# Patient Record
Sex: FEMALE | Race: WHITE | NOT HISPANIC OR LATINO | Employment: FULL TIME | URBAN - METROPOLITAN AREA
[De-identification: names, ages, dates, MRNs, and addresses within clinical notes are randomized per-mention and may not be internally consistent; named-entity substitution may affect disease eponyms.]

---

## 2020-07-19 ENCOUNTER — VIRTUAL VISIT (OUTPATIENT)
Dept: FAMILY MEDICINE | Facility: OTHER | Age: 30
End: 2020-07-19

## 2020-07-21 DIAGNOSIS — R05.9 COUGH: Primary | ICD-10-CM

## 2020-07-21 PROCEDURE — U0003 INFECTIOUS AGENT DETECTION BY NUCLEIC ACID (DNA OR RNA); SEVERE ACUTE RESPIRATORY SYNDROME CORONAVIRUS 2 (SARS-COV-2) (CORONAVIRUS DISEASE [COVID-19]), AMPLIFIED PROBE TECHNIQUE, MAKING USE OF HIGH THROUGHPUT TECHNOLOGIES AS DESCRIBED BY CMS-2020-01-R: HCPCS | Performed by: FAMILY MEDICINE

## 2020-07-21 NOTE — PROGRESS NOTES
"Date: 2020 08:13:11  Clinician: Lea Linn  Clinician NPI: [id number]  Patient: Catalina Foster  Patient : 1990  Patient Address: [address] [place], [place] [address]  Patient Phone: [phone]  Visit Protocol: URI  Patient Summary:  Catalina is a 29 year old ( : 1990 ) female who initiated a Visit for COVID-19 (Coronavirus) evaluation and screening. When asked the question \"Please sign me up to receive news, health information and promotions. \", Catalina responded \"No\".    Catalina states her symptoms started 1-2 days ago.   Her symptoms consist of a sore throat and a cough.   Symptom details     Cough: Catalina coughs a few times an hour and her cough is not more bothersome at night. Phlegm does not come into her throat when she coughs. She does not believe her cough is caused by post-nasal drip.     Sore throat: Catalina reports having mild throat pain (1-3 on a 10 point pain scale), does not have exudate on her tonsils, and can swallow liquids. The lymph nodes in her neck are not enlarged. A rash has not appeared on the skin since the sore throat started.      Catalina denies having wheezing, nausea, teeth pain, ageusia, diarrhea, vomiting, rhinitis, malaise, ear pain, headache, chills, enlarged lymph nodes, myalgias, anosmia, facial pain or pressure, fever, and nasal congestion. She also denies having recent facial or sinus surgery in the past 60 days and taking antibiotic medication in the past month. She is not experiencing dyspnea.   Precipitating events  Within the past week, Catalina has not been exposed to someone with strep throat. She has not recently been exposed to someone with influenza. Catalina has been in close contact with the following high risk individuals: people with asthma, heart disease or diabetes, immunocompromised people, and adults 65 or older.   Pertinent COVID-19 (Coronavirus) information  In the past 14 days, Catalina has not worked in a congregate living setting.   She does not work " or volunteer as healthcare worker or a  and does not work or volunteer in a healthcare facility.   Catalina also has not lived in a congregate living setting in the past 14 days. She does not live with a healthcare worker.   Catalina has not had a close contact with a laboratory-confirmed COVID-19 patient within 14 days of symptom onset.   Pertinent medical history  Catalina does not get yeast infections when she takes antibiotics.   Catalina does not need a return to work/school note.   Weight: 190 lbs   Catalina does not smoke or use smokeless tobacco.   She denies pregnancy and denies breastfeeding. She does not menstruate.   Weight: 190 lbs    MEDICATIONS: spironolactone oral, ALLERGIES: clindamycin  Clinician Response:  Dear Catalina,   Your symptoms show that you may have coronavirus (COVID-19). This illness can cause fever, cough and trouble breathing. Many people get a mild case and get better on their own. Some people can get very sick.  What should I do?  We would like to test you for this virus.   1. Please call [phone] to schedule your visit. Explain that you were referred by [facility] to have a COVID-19 test. Be ready to [facility].  The following will serve as your written order for this COVID Test, ordered by me, for the indication of suspected COVID [Z20.828]: The test will be ordered in [employer], our electronic health record, after you are scheduled. It will show as ordered and authorized by Joseph Regalado MD.  Order: COVID-19 (Coronavirus) PCR for SYMPTOMATIC testing from [facility].      2. When it's time for your COVID test:  Stay at least 6 feet away from others. (If someone will drive you to your test, stay in the backseat, as far away from the  as you can.)   Cover your mouth and nose with a mask, tissue or washcloth.  Go straight to the testing site. Don't make any stops on the way there or back.      3.Starting now: Stay home and away from others (self-isolate) until:   You've had  "no fever---and no medicine that reduces fever---for 3 full days (72 hours). And...   Your other symptoms have gotten better. For example, your cough or breathing has improved. And...   At least 10 days have passed since your symptoms started.       During this time, don't leave the house except for testing or medical care.   Stay in your own room, even for meals. Use your own bathroom if you can.   Stay away from others in your home. No hugging, kissing or shaking hands. No visitors.  Don't go to work, school or anywhere else.    Clean \"high touch\" surfaces often (doorknobs, counters, handles, etc.). Use a household cleaning spray or wipes. You'll find a full list of  on the EPA website: www.epa.gov/pesticide-registration/list-n-disinfectants-use-against-sars-cov-2.   Cover your mouth and nose with a mask, tissue or washcloth to avoid spreading germs.  Wash your hands and face often. Use soap and water.  Caregivers in these groups are at risk for severe illness due to COVID-19:  o People 65 years and older  o People who live in a nursing home or long-term care facility  o People with chronic disease (lung, heart, cancer, diabetes, kidney, liver, immunologic)  o People who have a weakened immune system, including those who:   Are in cancer treatment  Take medicine that weakens the immune system, such as corticosteroids  Had a bone marrow or organ transplant  Have an immune deficiency  Have poorly controlled HIV or AIDS  Are obese (body mass index of 40 or higher)  Smoke regularly   o Caregivers should wear gloves while washing dishes, handling laundry and cleaning bedrooms and bathrooms.  o Use caution when washing and drying laundry: Don't shake dirty laundry, and use the warmest water setting that you can.  o For more tips, go to www.cdc.gov/coronavirus/2019-ncov/downloads/10Things.pdf.    4.Sign up for [employer]. We know it's scary to hear that you might have COVID-19. We want to track your symptoms to " make sure you're okay over the next 2 weeks. Please look for an email from [employer]---this is a free, online program that we'll use to keep in touch. To sign up, follow the link in the email. Learn more at [email or web address]  How can I take care of myself?   Get lots of rest. Drink extra fluids (unless a doctor has told you not to).   Take Tylenol (acetaminophen) for fever or pain. If you have liver or kidney problems, ask your family doctor if it's okay to take Tylenol.   Adults can take either:    650 mg (two 325 mg pills) every 4 to 6 hours, or...   1,000 mg (two 500 mg pills) every 8 hours as needed.    Note: Don't take more than 3,000 mg in one day. Acetaminophen is found in many medicines (both prescribed and over-the-counter medicines). Read all labels to be sure you don't take too much.   For children, check the Tylenol bottle for the right dose. The dose is based on the child's age or weight.    If you have other health problems (like cancer, heart failure, an organ transplant or severe kidney disease): Call your specialty clinic if you don't feel better in the next 2 days.       Know when to call 911. Emergency warning signs include:    Trouble breathing or shortness of breath Pain or pressure in the chest that doesn't go away Feeling confused like you haven't felt before, or not being able to wake up Bluish-colored lips or face.  Where can I get more information?   [facility] -- About COVID-19: [email or web address]   CDC -- What to Do If You're Sick: www.cdc.gov/coronavirus/2019-ncov/about/steps-when-sick.html   CDC -- Ending Home Isolation: www.cdc.gov/coronavirus/2019-ncov/hcp/disposition-in-home-patients.html   CDC -- Caring for Someone: www.cdc.gov/coronavirus/2019-ncov/if-you-are-sick/care-for-someone.html   [facility] -- Interim Guidance for Hospital Discharge to Home: www.health.[facility].[place].us/diseases/coronavirus/hcp/hospdischarge.pdf   [facility]  trials (COVID-19 research studies): clinicalaffairs.[facility]/[email or web address]-trials    Below are the COVID-19 hotlines at the [place] Department of Health ([facility]). Interpreters are available.    For health questions: Call 596-432-8812 or 1-988.591.7103 (7 a.m. to 7 p.m.) For questions about schools and childcare: Call 040-197-8977 or 1-590.307.6828 (7 a.m. to 7 p.m.)    Diagnosis: Cough  Diagnosis ICD: R05

## 2020-07-21 NOTE — LETTER
July 23, 2020        Catalina Foster  [address] [place] [facility] [facility] [address]    This letter provides a written record that you were tested for COVID-19 on 7/21/2020.       Your result was negative. This means that we didn t find the virus that causes COVID-19 in your sample. A test may show negative when you do actually have the virus. This can happen when the virus is in the early stages of infection, before you feel illness symptoms.    If you have symptoms   Stay home and away from others (self-isolate) until you meet ALL of the guidelines below:    You ve had no fever--and no medicine that reduces fever--for 3 full days (72 hours). And      Your other symptoms have gotten better. For example, your cough or breathing has improved. And     At least 10 days have passed since your symptoms started.    During this time:    Stay home. Don t go to work, school or anywhere else.     Stay in your own room, including for meals. Use your own bathroom if you can.    Stay away from others in your home. No hugging, kissing or shaking hands. No visitors.    Clean  high touch  surfaces often (doorknobs, counters, handles, etc.). Use a household cleaning spray or wipes. You can find a full list on the EPA website at www.epa.gov/pesticide-registration/list-n-disinfectants-use-against-sars-cov-2.    Cover your mouth and nose with a mask, tissue or washcloth to avoid spreading germs.    Wash your hands and face often with soap and water.    Going back to work  Check with your employer for any guidelines to follow for going back to work.    Employers: This document serves as formal notice that your employee tested negative for COVID-19, as of the testing date shown above.

## 2020-07-22 LAB
SARS-COV-2 RNA SPEC QL NAA+PROBE: NOT DETECTED
SPECIMEN SOURCE: NORMAL

## 2020-12-14 ENCOUNTER — TRANSCRIBE ORDERS (OUTPATIENT)
Dept: OTHER | Age: 30
End: 2020-12-14

## 2020-12-14 DIAGNOSIS — L70.0 ACNE VULGARIS: Primary | ICD-10-CM

## 2021-02-09 ENCOUNTER — VIRTUAL VISIT (OUTPATIENT)
Dept: DERMATOLOGY | Facility: CLINIC | Age: 31
End: 2021-02-09
Payer: COMMERCIAL

## 2021-02-09 DIAGNOSIS — L70.0 ACNE VULGARIS: Primary | ICD-10-CM

## 2021-02-09 PROCEDURE — 99203 OFFICE O/P NEW LOW 30 MIN: CPT | Mod: GQ | Performed by: DERMATOLOGY

## 2021-02-09 RX ORDER — TRETINOIN 0.25 MG/G
CREAM TOPICAL
Qty: 45 G | Refills: 11 | Status: SHIPPED | OUTPATIENT
Start: 2021-02-09 | End: 2022-04-20

## 2021-02-09 RX ORDER — FLUTICASONE PROPIONATE 50 MCG
1 SPRAY, SUSPENSION (ML) NASAL
COMMUNITY
Start: 2021-01-27 | End: 2022-04-20

## 2021-02-09 RX ORDER — CLINDAMYCIN PHOSPHATE 10 UG/ML
LOTION TOPICAL
Qty: 120 ML | Refills: 11 | Status: SHIPPED | OUTPATIENT
Start: 2021-02-09 | End: 2022-04-20

## 2021-02-09 RX ORDER — CLINDAMYCIN PHOSPHATE 10 MG/G
GEL TOPICAL
COMMUNITY
Start: 2019-11-27 | End: 2022-04-20

## 2021-02-09 RX ORDER — SPIRONOLACTONE 50 MG/1
50 TABLET, FILM COATED ORAL DAILY
Qty: 90 TABLET | Refills: 3 | Status: SHIPPED | OUTPATIENT
Start: 2021-02-09 | End: 2022-04-20

## 2021-02-09 RX ORDER — L.ACIDOPHIL/L.PLANTAR/BIFIDO 7 15B CELL
1 CAPSULE ORAL
COMMUNITY
Start: 2020-11-11

## 2021-02-09 RX ORDER — SPIRONOLACTONE 50 MG/1
TABLET, FILM COATED ORAL
COMMUNITY
Start: 2020-07-31 | End: 2021-12-20

## 2021-02-09 NOTE — LETTER
2/9/2021       RE: Catalina Foster  [address] [place] [facility] [address]     Dear Colleague,    Thank you for referring your patient, Catalina Foster, to the [facility] DERMAT[facility] [place] at [facility]. Please see a copy of my visit note below.    [facility] Dermatology Note  Encounter Date: Feb 9, 2021  Store-and-Forward and Telephone ([phone]). Location of teledermatologist: [facility]MAT[facility] [place].  Start time: 3:31 PM. End time: 3:48 PM.    Dermatology Problem List:  1.  Acne vulgaris, mild to moderate inflammatory acne, face, chest, back  - spironolactone 50 mg PO qdaily (decreased from 50 mg PO BID)  - tretinoin 0.025% cream at bedtime; clindamycin 1% lotion qAM, BP 4-5% wash in the shower daily    ____________________________________________    Assessment & Plan:     # Acne vulgaris, mild to moderate inflammatory acne, face, chest, back, in setting of Mirena IUD. Suspect likely hormonal induced. Has not tolerated spironolactone with some dehydration and headaches. We discussed alternative topical therapies versus isotretinoin and patient agreed to trial of topicals and consideration of isotretinoin in the future if not effective. Will decrease spironolactone dose to help limit side effects.   - Start tretinoin 0.025% cream at bedtime; side effects of skin dryness, irritation, photosensitivity discussed. Can start every other or third night and increase to nightly as tolerated.   - Start clindamycin 1% lotion qAM and benzoyl peroxide wash in the shower  - Reduce spironolactone from 50 mg PO BID to 50 mg PO qdaily; has Mirena IUD.     Procedures Performed:    None    Follow-up: 3 month(s) virtually (telephone with photos), or earlier for new or changing lesions    Staff:     Roberto Carlos Jiménez MD  Pronouns: he/him/his    Department of Dermatology  [facility]  [facility]: Phone: [phone], Fax:[phone]  [facility]: Phone: [phone] Fax: [phone]    ____________________________________________    CC: Derm Problem (Acne - Catalina would like to discuss treatment options for her back and face. She is currently taking spirinolactone )    HPI:  Ms. Catalina Foster is a(n) 30 year old female who presents today as a new patient for evaluation of acne vulgaris. She reports about a 2-3 year history of acne vulgaris on the face, chest, and back. She states this did occur after having the Mirena IUD placed in 2017. This has been managed with spironolactone 50 mg PO BID and clindamycin 1% gel PRN use, which has been quite helpful. However, the patient reports side effects from spironolactone including dehydration, headaches and would like to discuss alternatives. Health otherwise stable. No other skin concerns.     Labs:  NA    Physical Exam:  Vitals: There were no vitals taken for this visit.  SKIN: Teledermatology photos were reviewed; image quality and interpretability: acceptable. Image date: see upload date.  - Few acneiform papules on chin; hypopigmented macules c/w scarring on chest and upper back  - No other lesions of concern on areas examined.             Medications:  Current Outpatient Medications   Medication     clindamycin (CLINDAMAX) 1 % external gel     fluticasone (FLONASE) 50 MCG/ACT nasal spray     levonorgestrel (MIRENA) 20 MCG/24HR IUD     Probiotic Product (UP4 PROBIOTICS WOMENS) CAPS     spironolactone (ALDACTONE) 50 MG tablet     No current facility-administered medications for this visit.       Past Medical/Surgical History:   There is no problem list on file for this patient.    No past medical history on file.    CC Edelmira Portillo, JOIE  [facility]  [address] [place] [address] on close of this encounter.

## 2021-02-09 NOTE — PATIENT INSTRUCTIONS
[facility] Dermatology Visit    Thank you for allowing us to participate in your care. Your findings, instructions and follow-up plan are as follows:     Acne.     1. Start tretinoin 0.025% cream at nighttime before bed. Can start every third night and increase to nightly as tolerated. Can cause dry skin, recommend Vanicream Lite lotion to help prevent skin dryness. See additional side effects and recommendation in hand-out as below.   2. Start using clindamycin 1% lotion once daily in the morning.   3. Start using an over-the-counter benzoyl peroxide 4 or 5% wash in the shower daily. Recommend Pan-Oxyl or Differin Cleanser. You can buy these at [employer] or [employer].   4. Can reduce dose of spironolactone from 50 mg twice daily to 50 mg once daily. Recommend taking only at nighttime before bed and eliminating morning dose.   5. Follow-up in 3 months.     Topical Retinoids    What are topical retinoids?    These are medicines that are related to Vitamin A. They are used on the skin.    Retin-A , Renova , Differin , and Tazorac  are brand names.    Come in creams and clear gels    Used to treat skin conditions like pimples (acne), face wrinkling, or dark-colored sunspots    How do I use these medicines?    Wash face and let dry for 15 to 30 minutes.    Use a large pea-size amount of medicine to cover the whole face. Do not put on close to the eyes and lips. Rub in gently.     Start by using every other day. If you have no irritation after a few days, start to use it daily.     You might have too much irritation with daily use. Use it less often until the irritation goes away. Then try to increase slowly to daily use.     Irritation improves over time.    You may use moisturizer if your skin becomes dry. Look for  non-comedogenic  (non-pore plugging) and oil free products.     What are the side effects?    Dryness     Peeling and flaking     Irritation of the skin     Possible increased chance of  sunburns. Protect your skin from sunlight. Wear a hat and use a sunscreen with SPF 30 or higher. Your sunscreen should have both UVA and UVB (broad-spectrum) protection.    Who should I call with questions?    [facility]: [phone]     [facility]: [phone]    For urgent needs outside of business hours call the [facility] at [phone] and ask for the dermatology resident on call        When should I call my doctor?    If you are worsening or not improving, please, contact us or seek urgent care as noted below.     Who should I call with questions (adults)?    [facility] (adult and pediatric): [phone]     [facility] (adult): [phone]    For urgent needs outside of business hours call the [facility] at [phone] and ask for the dermatology resident on call    If this is a medical emergency and you are unable to reach an ER, Call [phone]      Who should I call with questions (pediatric)?  [facility]- Pediatric Dermatology  Dr. Cely Kurtz, Dr. Robb Herring, Dr. Sharona Anguiano, Shanice Beniteznhmalia, PA  Dr. Lima Ball, Dr. Deedee Samano & Dr. Koko Townsend  Non Urgent  Nurse Triage Line; [phone]- Ahsley and Katya BRANDON Care Coordinators   [place] (/Complex ) [phone]    If you need a prescription refill, please contact your pharmacy. Refills are approved or denied by our Physicians during normal business hours, Monday through Fridays  Per office policy, refills will not be granted if you have not been seen within the past year (or sooner depending on your child's condition)    Scheduling Information:  Pediatric Appointment Scheduling and Call Center [phone]  Radiology Scheduling- [phone]  Sedation Unit Scheduling- [phone]  [place] Scheduling- General [phone]; Pediatric  Dermatology [phone]  Main  Services: [phone]  Wolof: [phone]  Ecuadorean: [phone]  Hmong/Bangladeshi/Hungarian: [phone]  Preadmission Nursing Department Fax Number: [phone] (Fax all pre-operative paperwork to this number)    For urgent matters arising during evenings, weekends, or holidays that cannot wait for normal business hours please call [phone] and ask for the Dermatology Resident On-Call to be paged.

## 2021-02-09 NOTE — NURSING NOTE
Dermatology Rooming Note    Catalina Foster's goals for this visit include:   Chief Complaint   Patient presents with     Derm Problem     Acne - Catalina would like to discuss treatment options for her back and face. She is currently taking spirinolactone      Margie Cotto CMA

## 2021-02-09 NOTE — PROGRESS NOTES
[facility] Dermatology Note  Encounter Date: Feb 9, 2021  Store-and-Forward and Telephone ([phone]). Location of teledermatologist: [facility] DERMATOLOGY [facility]INIC [place].  Start time: 3:31 PM. End time: 3:48 PM.    Dermatology Problem List:  1.  Acne vulgaris, mild to moderate inflammatory acne, face, chest, back  - spironolactone 50 mg PO qdaily (decreased from 50 mg PO BID)  - tretinoin 0.025% cream at bedtime; clindamycin 1% lotion qAM, BP 4-5% wash in the shower daily    ____________________________________________    Assessment & Plan:     # Acne vulgaris, mild to moderate inflammatory acne, face, chest, back, in setting of Mirena IUD. Suspect likely hormonal induced. Has not tolerated spironolactone with some dehydration and headaches. We discussed alternative topical therapies versus isotretinoin and patient agreed to trial of topicals and consideration of isotretinoin in the future if not effective. Will decrease spironolactone dose to help limit side effects.   - Start tretinoin 0.025% cream at bedtime; side effects of skin dryness, irritation, photosensitivity discussed. Can start every other or third night and increase to nightly as tolerated.   - Start clindamycin 1% lotion qAM and benzoyl peroxide wash in the shower  - Reduce spironolactone from 50 mg PO BID to 50 mg PO qdaily; has Mirena IUD.     Procedures Performed:    None    Follow-up: 3 month(s) virtually (telephone with photos), or earlier for new or changing lesions    Staff:     Roberto Carlos Jiménez MD  Pronouns: he/him/his    Department of Dermatology  [facility]: Phone: [phone], Fax:[phone]  [facility]: Phone: [phone] Fax: [phone]    ____________________________________________    CC: Derm Problem (Acne - Catalina would like to discuss treatment options for her back and  face. She is currently taking spirinolactone )    HPI:  Ms. Catalina Foster is a(n) 30 year old female who presents today as a new patient for evaluation of acne vulgaris. She reports about a 2-3 year history of acne vulgaris on the face, chest, and back. She states this did occur after having the Mirena IUD placed in 2017. This has been managed with spironolactone 50 mg PO BID and clindamycin 1% gel PRN use, which has been quite helpful. However, the patient reports side effects from spironolactone including dehydration, headaches and would like to discuss alternatives. Health otherwise stable. No other skin concerns.     Labs:  NA    Physical Exam:  Vitals: There were no vitals taken for this visit.  SKIN: Teledermatology photos were reviewed; image quality and interpretability: acceptable. Image date: see upload date.  - Few acneiform papules on chin; hypopigmented macules c/w scarring on chest and upper back  - No other lesions of concern on areas examined.             Medications:  Current Outpatient Medications   Medication     clindamycin (CLINDAMAX) 1 % external gel     fluticasone (FLONASE) 50 MCG/ACT nasal spray     levonorgestrel (MIRENA) 20 MCG/24HR IUD     Probiotic Product (UP4 PROBIOTICS WOMENS) CAPS     spironolactone (ALDACTONE) 50 MG tablet     No current facility-administered medications for this visit.       Past Medical/Surgical History:   There is no problem list on file for this patient.    No past medical history on file.    CC Edelmira Portillo, JOIE  [facility]  [address] [place] [address] on close of this encounter.

## 2021-03-13 ENCOUNTER — HEALTH MAINTENANCE LETTER (OUTPATIENT)
Age: 31
End: 2021-03-13

## 2021-05-11 ENCOUNTER — OFFICE VISIT (OUTPATIENT)
Dept: DERMATOLOGY | Facility: CLINIC | Age: 31
End: 2021-05-11
Payer: COMMERCIAL

## 2021-05-11 ENCOUNTER — APPOINTMENT (OUTPATIENT)
Dept: LAB | Facility: CLINIC | Age: 31
End: 2021-05-11
Payer: COMMERCIAL

## 2021-05-11 DIAGNOSIS — Z51.81 THERAPEUTIC DRUG MONITORING: Primary | ICD-10-CM

## 2021-05-11 DIAGNOSIS — L70.0 ACNE VULGARIS: ICD-10-CM

## 2021-05-11 LAB
ALBUMIN SERPL-MCNC: 3.8 G/DL (ref 3.4–5)
ALP SERPL-CCNC: 53 U/L (ref 40–150)
ALT SERPL W P-5'-P-CCNC: 32 U/L (ref 0–50)
AST SERPL W P-5'-P-CCNC: 14 U/L (ref 0–45)
BASOPHILS # BLD AUTO: 0 10E9/L (ref 0–0.2)
BASOPHILS NFR BLD AUTO: 0.7 %
BILIRUB DIRECT SERPL-MCNC: 0.1 MG/DL (ref 0–0.2)
BILIRUB SERPL-MCNC: 0.4 MG/DL (ref 0.2–1.3)
CHOLEST SERPL-MCNC: 208 MG/DL
DIFFERENTIAL METHOD BLD: ABNORMAL
EOSINOPHIL # BLD AUTO: 0 10E9/L (ref 0–0.7)
EOSINOPHIL NFR BLD AUTO: 0.5 %
ERYTHROCYTE [DISTWIDTH] IN BLOOD BY AUTOMATED COUNT: 12.8 % (ref 10–15)
HCT VFR BLD AUTO: 34.8 % (ref 35–47)
HDLC SERPL-MCNC: 80 MG/DL
HGB BLD-MCNC: 11.3 G/DL (ref 11.7–15.7)
IMM GRANULOCYTES # BLD: 0 10E9/L (ref 0–0.4)
IMM GRANULOCYTES NFR BLD: 0.4 %
LDLC SERPL CALC-MCNC: 99 MG/DL
LYMPHOCYTES # BLD AUTO: 2.3 10E9/L (ref 0.8–5.3)
LYMPHOCYTES NFR BLD AUTO: 41 %
MCH RBC QN AUTO: 29.7 PG (ref 26.5–33)
MCHC RBC AUTO-ENTMCNC: 32.5 G/DL (ref 31.5–36.5)
MCV RBC AUTO: 91 FL (ref 78–100)
MONOCYTES # BLD AUTO: 0.5 10E9/L (ref 0–1.3)
MONOCYTES NFR BLD AUTO: 9 %
NEUTROPHILS # BLD AUTO: 2.7 10E9/L (ref 1.6–8.3)
NEUTROPHILS NFR BLD AUTO: 48.4 %
NONHDLC SERPL-MCNC: 128 MG/DL
NRBC # BLD AUTO: 0 10*3/UL
NRBC BLD AUTO-RTO: 0 /100
PLATELET # BLD AUTO: 309 10E9/L (ref 150–450)
PROT SERPL-MCNC: 7.5 G/DL (ref 6.8–8.8)
RBC # BLD AUTO: 3.81 10E12/L (ref 3.8–5.2)
TRIGL SERPL-MCNC: 145 MG/DL
WBC # BLD AUTO: 5.6 10E9/L (ref 4–11)

## 2021-05-11 PROCEDURE — 36415 COLL VENOUS BLD VENIPUNCTURE: CPT | Performed by: PATHOLOGY

## 2021-05-11 PROCEDURE — 85025 COMPLETE CBC W/AUTO DIFF WBC: CPT | Performed by: PATHOLOGY

## 2021-05-11 PROCEDURE — 80061 LIPID PANEL: CPT | Performed by: PATHOLOGY

## 2021-05-11 PROCEDURE — 80076 HEPATIC FUNCTION PANEL: CPT | Performed by: PATHOLOGY

## 2021-05-11 PROCEDURE — 99214 OFFICE O/P EST MOD 30 MIN: CPT | Mod: GC | Performed by: DERMATOLOGY

## 2021-05-11 SDOH — HEALTH STABILITY: MENTAL HEALTH: HOW OFTEN DO YOU HAVE A DRINK CONTAINING ALCOHOL?: NOT ASKED

## 2021-05-11 SDOH — HEALTH STABILITY: MENTAL HEALTH: HOW MANY STANDARD DRINKS CONTAINING ALCOHOL DO YOU HAVE ON A TYPICAL DAY?: NOT ASKED

## 2021-05-11 SDOH — HEALTH STABILITY: MENTAL HEALTH: HOW OFTEN DO YOU HAVE 6 OR MORE DRINKS ON ONE OCCASION?: NOT ASKED

## 2021-05-11 ASSESSMENT — PAIN SCALES - GENERAL: PAINLEVEL: NO PAIN (0)

## 2021-05-11 NOTE — LETTER
Date:May 16, 2021      Patient was self referred, no letter generated. Do not send.        [facility] Health Information

## 2021-05-11 NOTE — LETTER
5/11/2021       RE: Catalina Foster  [address] [facility] [address]     Dear Colleague,    Thank you for referring your patient, Catalina Foster, to the [facility] DERMATOLOGY CLINIC [place] at [facility]. Please see a copy of my visit note below.    [facility] Dermatology Note  Encounter Date: May 11, 2021  Office Visit     Dermatology Problem List:  1. Acne Vulgaris   - Current treatment: clindamycin 1% lotion, spironolactone 50 mg, Retin-A 0.025%, plan to start isotretinoin    ____________________________________________    Assessment & Plan:     # Acne vulgaris  Here today for follow up of acne. Current regimen includes topical clindamycin, Retin-A 0.025%, spironolactone 50 mg. Has Mirena IUD in place. Acne flaring over the past month. Worse after decreasing the spironolactone (unable to tolerate higher doses due to side effects). Discussed starting Accutane today and patient interested in starting this. Getting septoplasty in 1 month so will not start Accutane until 1 month after this due to concerns with wound healing. Discussed expected side effects including dry skin. Discussed I-pledge and need for 2 forms of contraception. Patient has a mirena in place and will use condoms. Patient would like to proceed with Accutane  - Follow up in 2 months to start Accutane  - Patient will mychart message with the 2 negative pregnancy tests - in 1 month (first test) and in 2 months (at next follow-up for confirmatory test).   -Will obtain baseline labs of CBC, hepatic profile, cholesterol panel today     Procedures Performed:   None    Follow-up: 2 month(s) in-person, or earlier for new or changing lesions    Staff and Resident:     Hakeem Harrison MD  [facility] Family Medicine Resident  Pager# [phone]    Precepted with Dr. Jiménez    Staff Physician Comments:   I saw and evaluated the patient with the resident and I agree with the  assessment and plan.  I was present for the examination.    Roberto Carlos Jiménez MD  Pronouns: he/him/his    Department of Dermatology  [facility]: Phone: [phone], Fax:[phone]  [facility]: Phone: [phone] Fax: [phone]    ____________________________________________    CC: Skin Check (pt states she is here of a follow up oh her acne on her face and back, pt states things overall are going better )    HPI:  Ms. Catalina Foster is a(n) 30 year old female who presents today for follow-up  for acne vulgaris.  -At last visit had spironolactone decreased to 50 mg daily due to headaches and dehydration  -Flaring up over the last month and over the last week has noticed it is much drier    Current regimen includes:  -Cetaphil  in the AM  - Clindamycin 1% lotion  -Spironolactone 50 mg  -Cerave moisturizer with SPF    PM:  -Cetaphil Wash  -Retin-A 0.025% cream    -Benzoyl wash when she showers every other day      Patient is otherwise feeling well, without additional skin concerns.    Labs Reviewed:  N/A    Physical Exam:  Vitals: There were no vitals taken for this visit.  SKIN: Acne exam, which includes the face, neck, upper central chest, and upper central back was performed.  - There are superifical acneiform papules with intermixed open and closed comedones on the chin, face and base of the scalp. There are admixed pink to hyperpigmented macules c/w scarring.   - No other lesions of concern on areas examined.     Medications:  Current Outpatient Medications   Medication     clindamycin (CLEOCIN T) 1 % external lotion     clindamycin (CLINDAMAX) 1 % external gel     fluticasone (FLONASE) 50 MCG/ACT nasal spray     levonorgestrel (MIRENA) 20 MCG/24HR IUD     Probiotic Product (UP4 PROBIOTICS WOMENS) CAPS     spironolactone (ALDACTONE) 50 MG tablet     spironolactone (ALDACTONE)  50 MG tablet     tretinoin (RETIN-A) 0.025 % external cream     No current facility-administered medications for this visit.       Past Medical History:   There is no problem list on file for this patient.    History reviewed. No pertinent past medical history.        Again, thank you for allowing me to participate in the care of your patient.      Sincerely,    Roberto Carlos Jiménez MD

## 2021-05-11 NOTE — NURSING NOTE
Chief Complaint   Patient presents with     Skin Check     pt states she is here of a follow up oh her acne on her face and back, pt states things overall are going better      Wendy Mujica MA

## 2021-05-11 NOTE — PROGRESS NOTES
[facility] Dermatology Note  Encounter Date: May 11, 2021  Office Visit     Dermatology Problem List:  1. Acne Vulgaris   - Current treatment: clindamycin 1% lotion, spironolactone 50 mg, Retin-A 0.025%, plan to start isotretinoin    ____________________________________________    Assessment & Plan:     # Acne vulgaris  Here today for follow up of acne. Current regimen includes topical clindamycin, Retin-A 0.025%, spironolactone 50 mg. Has Mirena IUD in place. Acne flaring over the past month. Worse after decreasing the spironolactone (unable to tolerate higher doses due to side effects). Discussed starting Accutane today and patient interested in starting this. Getting septoplasty in 1 month so will not start Accutane until 1 month after this due to concerns with wound healing. Discussed expected side effects including dry skin. Discussed I-pledge and need for 2 forms of contraception. Patient has a mirena in place and will use condoms. Patient would like to proceed with Accutane  - Follow up in 2 months to start Accutane  - Patient will mychart message with the 2 negative pregnancy tests - in 1 month (first test) and in 2 months (at next follow-up for confirmatory test).   -Will obtain baseline labs of CBC, hepatic profile, cholesterol panel today     Procedures Performed:   None    Follow-up: 2 month(s) in-person, or earlier for new or changing lesions    Staff and Resident:     Hakeem Harrison MD  [facility] Medicine Resident  Pager# [phone]    Precepted with Dr. Jiménez    Staff Physician Comments:   I saw and evaluated the patient with the resident and I agree with the assessment and plan.  I was present for the examination.    Roberto Carlos Jiménez MD  Pronouns: he/him/his    Department of Dermatology  [facility]: Phone: [phone], Fax:[phone]  [facility]:  Phone: [phone] Fax: [phone]    ____________________________________________    CC: Skin Check (pt states she is here of a follow up oh her acne on her face and back, pt states things overall are going better )    HPI:  Ms. Catalina Foster is a(n) 30 year old female who presents today for follow-up  for acne vulgaris.  -At last visit had spironolactone decreased to 50 mg daily due to headaches and dehydration  -Flaring up over the last month and over the last week has noticed it is much drier    Current regimen includes:  -Cetaphil  in the AM  - Clindamycin 1% lotion  -Spironolactone 50 mg  -Cerave moisturizer with SPF    PM:  -Cetaphil Wash  -Retin-A 0.025% cream    -Benzoyl wash when she showers every other day      Patient is otherwise feeling well, without additional skin concerns.    Labs Reviewed:  N/A    Physical Exam:  Vitals: There were no vitals taken for this visit.  SKIN: Acne exam, which includes the face, neck, upper central chest, and upper central back was performed.  - There are superifical acneiform papules with intermixed open and closed comedones on the chin, face and base of the scalp. There are admixed pink to hyperpigmented macules c/w scarring.   - No other lesions of concern on areas examined.     Medications:  Current Outpatient Medications   Medication     clindamycin (CLEOCIN T) 1 % external lotion     clindamycin (CLINDAMAX) 1 % external gel     fluticasone (FLONASE) 50 MCG/ACT nasal spray     levonorgestrel (MIRENA) 20 MCG/24HR IUD     Probiotic Product (UP4 PROBIOTICS WOMENS) CAPS     spironolactone (ALDACTONE) 50 MG tablet     spironolactone (ALDACTONE) 50 MG tablet     tretinoin (RETIN-A) 0.025 % external cream     No current facility-administered medications for this visit.       Past Medical History:   There is no problem list on file for this patient.    History reviewed. No pertinent past medical history.

## 2021-05-12 NOTE — RESULT ENCOUNTER NOTE
Angela hair.     Roberto Carlos Jiménez MD  Pronouns: he/him/his    Department of Dermatology  [facility]: Phone: [phone], Fax:[phone]  [facility]: Phone: [phone] Fax: [phone]

## 2021-06-15 ENCOUNTER — MYC MEDICAL ADVICE (OUTPATIENT)
Dept: DERMATOLOGY | Facility: CLINIC | Age: 31
End: 2021-06-15

## 2021-07-15 ENCOUNTER — VIRTUAL VISIT (OUTPATIENT)
Dept: DERMATOLOGY | Facility: CLINIC | Age: 31
End: 2021-07-15
Payer: COMMERCIAL

## 2021-07-15 DIAGNOSIS — L70.0 ACNE VULGARIS: Primary | ICD-10-CM

## 2021-07-15 DIAGNOSIS — Z51.81 THERAPEUTIC DRUG MONITORING: ICD-10-CM

## 2021-07-15 PROCEDURE — 99214 OFFICE O/P EST MOD 30 MIN: CPT | Mod: 95 | Performed by: DERMATOLOGY

## 2021-07-15 RX ORDER — ISOTRETINOIN 40 MG/1
40 CAPSULE ORAL DAILY
Qty: 30 CAPSULE | Refills: 0 | Status: SHIPPED | OUTPATIENT
Start: 2021-07-15 | End: 2021-08-02

## 2021-07-15 NOTE — PROGRESS NOTES
Dermatology Rooming Note    Catalina Foster's goals for this visit include:   Chief Complaint   Patient presents with     Derm Problem     Here for follow up on the accutane.  Pt reports acne has gotten worse.     Jaylyn Ramírez RN

## 2021-07-15 NOTE — PROGRESS NOTES
[facility] Dermatology Note  Encounter Date: Jul 15, 2021  Telephone Visit with Photos  Start Time: 3:14 PM; End Time: 3:22 PM    Dermatology Problem List:  1. Acne vulgaris   - Current treatment: spironolactone 50 mg, isotretinoin  - Prior tx: clindamycin 1% lotion, Retin-A 0.025%  ____________________________________________    Assessment & Plan:     1. Acne vulgaris. Plan to start isotretinoin as below.     - Labs: Urine pregnancy test, lipid and hepatic panel in 1 month. Lab orders placed.     - Plan to start isotretinoin at starting dose of 40 mg    - Cumulative dose: 0 mg    - Goal cumulative dose of 13,050 mg - 19,140 mg (150-220 mg/kg in this 87 kg patient)    - IPledge Category: Female of Reproductive Potential (FRP)    - Primary Form of Contraception (if applicable): Mirena IUD    - Secondary Form of Contraception (if applicable): Condoms    - Discussion of the risks and side effects of Accutane including but not limited to mucocutaneous dryness, arthralgias, myalgias, depression, suicidal ideation, headache, blurred vision,  increase in liver function tests, increase in lipids, and teratogenic effects.  The ipledgeprogram brochure was provided and the contents discussed with the patient. The patient was counseled they cannot give blood while on Accutane. No personal or family history of inflammatory bowel disease or hypertriglyceridemia known to patient. Reviewed need to avoid alcohol on medication. I-pledge program consent was obtained. Patient counseled that if they wear contacts eye may become too dry to tolerate. Recommend follow up with eye doctor.     - The patient will stop all other topical and systemic acne medications.     - Use Cetaphil gentle facial cleanser BID for face and/or body. Aquaphor or Vaseline for lips.        Procedures Performed:   None    Follow-up: 1 month(s) in-person, or earlier for new or changing lesions    Staff    Roberto Carlos Jiménez MD  Pronouns:  he/him/his    Department of Dermatology  [facility]: Phone: [phone], Fax:[phone]  [facility]: Phone: [phone] Fax: [phone]    ____________________________________________    CC: Derm Problem (Here for follow up on the accutane.  Pt reports acne has gotten worse.)    HPI:  Ms. Catalina Foster is a(n) 30 year old female who presents today for follow-up  for acne vulgaris. Last visit with plan to start isotretinoin. She otherwise continued on topical therapies and spironolactone.     Today, patient reports acne is slightly worse from prior, especially on the chin area. Otherwise continues on spironolactone and topical therapies.     Patient is otherwise feeling well, without additional skin concerns.    Labs Reviewed:  N/A    Physical Exam:  Vitals: There were no vitals taken for this visit.  SKIN: Acne exam, which includes the face, neck, upper central chest, and upper central back was performed.  - There are superifical acneiform papules with intermixed open and closed comedones on the chin, face and base of the scalp. There are admixed pink to hyperpigmented macules c/w scarring.   - No other lesions of concern on areas examined.               Medications:  Current Outpatient Medications   Medication     clindamycin (CLEOCIN T) 1 % external lotion     clindamycin (CLINDAMAX) 1 % external gel     levonorgestrel (MIRENA) 20 MCG/24HR IUD     Probiotic Product (UP4 PROBIOTICS WOMENS) CAPS     spironolactone (ALDACTONE) 50 MG tablet     tretinoin (RETIN-A) 0.025 % external cream     fluticasone (FLONASE) 50 MCG/ACT nasal spray     spironolactone (ALDACTONE) 50 MG tablet     No current facility-administered medications for this visit.      Past Medical History:   There is no problem list on file for this patient.    No past medical history on file.

## 2021-07-15 NOTE — LETTER
Date:August 18, 2021      Patient was self referred, no letter generated. Do not send.        [facility] Health Information

## 2021-07-15 NOTE — LETTER
7/15/2021       RE: Catalina Foster  [address] [facility] [address]     Dear Colleague,    Thank you for referring your patient, Catalina Foster, to the [facility] DERMATOLOGY CLINIC [place] at [facility]. Please see a copy of my visit note below.    Dermatology Rooming Note    Catalina Foster's goals for this visit include:   Chief Complaint   Patient presents with     Derm Problem     Here for follow up on the accutane.  Pt reports acne has gotten worse.     Jaylyn Ramírez, RN      [facility] Dermatology Note  Encounter Date: Jul 15, 2021  Telephone Visit with Photos  Start Time: 3:14 PM; End Time: 3:22 PM    Dermatology Problem List:  1. Acne vulgaris   - Current treatment: spironolactone 50 mg, isotretinoin  - Prior tx: clindamycin 1% lotion, Retin-A 0.025%  ____________________________________________    Assessment & Plan:     1. Acne vulgaris. Plan to start isotretinoin as below.     - Labs: Urine pregnancy test, lipid and hepatic panel in 1 month. Lab orders placed.     - Plan to start isotretinoin at starting dose of 40 mg    - Cumulative dose: 0 mg    - Goal cumulative dose of 13,050 mg - 19,140 mg (150-220 mg/kg in this 87 kg patient)    - IPledge Category: Female of Reproductive Potential (FRP)    - Primary Form of Contraception (if applicable): Mirena IUD    - Secondary Form of Contraception (if applicable): Condoms    - Discussion of the risks and side effects of Accutane including but not limited to mucocutaneous dryness, arthralgias, myalgias, depression, suicidal ideation, headache, blurred vision,  increase in liver function tests, increase in lipids, and teratogenic effects.  The ipledgeprogram brochure was provided and the contents discussed with the patient. The patient was counseled they cannot give blood while on Accutane. No personal or family history of inflammatory bowel disease or hypertriglyceridemia  known to patient. Reviewed need to avoid alcohol on medication. I-pledge program consent was obtained. Patient counseled that if they wear contacts eye may become too dry to tolerate. Recommend follow up with eye doctor.     - The patient will stop all other topical and systemic acne medications.     - Use Cetaphil gentle facial cleanser BID for face and/or body. Aquaphor or Vaseline for lips.        Procedures Performed:   None    Follow-up: 1 month(s) in-person, or earlier for new or changing lesions    Staff    Roberto Carlos Jiménez MD  Pronouns: he/him/his    Department of Dermatology  [facility]: Phone: [phone], Fax:[phone]  [facility]: Phone: [phone] Fax: [phone]    ____________________________________________    CC: Derm Problem (Here for follow up on the accutane.  Pt reports acne has gotten worse.)    HPI:  Ms. Catalina Foster is a(n) 30 year old female who presents today for follow-up  for acne vulgaris. Last visit with plan to start isotretinoin. She otherwise continued on topical therapies and spironolactone.     Today, patient reports acne is slightly worse from prior, especially on the chin area. Otherwise continues on spironolactone and topical therapies.     Patient is otherwise feeling well, without additional skin concerns.    Labs Reviewed:  N/A    Physical Exam:  Vitals: There were no vitals taken for this visit.  SKIN: Acne exam, which includes the face, neck, upper central chest, and upper central back was performed.  - There are superifical acneiform papules with intermixed open and closed comedones on the chin, face and base of the scalp. There are admixed pink to hyperpigmented macules c/w scarring.   - No other lesions of concern on areas examined.               Medications:  Current Outpatient Medications   Medication     clindamycin (CLEOCIN T) 1 %  external lotion     clindamycin (CLINDAMAX) 1 % external gel     levonorgestrel (MIRENA) 20 MCG/24HR IUD     Probiotic Product (UP4 PROBIOTICS WOMENS) CAPS     spironolactone (ALDACTONE) 50 MG tablet     tretinoin (RETIN-A) 0.025 % external cream     fluticasone (FLONASE) 50 MCG/ACT nasal spray     spironolactone (ALDACTONE) 50 MG tablet     No current facility-administered medications for this visit.      Past Medical History:   There is no problem list on file for this patient.    No past medical history on file.      Again, thank you for allowing me to participate in the care of your patient.      Sincerely,    Roberto Carlos Jiménez MD

## 2021-07-15 NOTE — PATIENT INSTRUCTIONS
[facility] Dermatology Visit    Thank you for allowing us to participate in your care. Your findings, instructions and follow-up plan are as follows:    Acne vulgaris.   1. OK to continue spironolactone  2. Stop topical therapies.   3. Start isotretinoin 40 mg daily.   4. Follow-up in 4 weeks; plan to do in-person and check labs at that visit.       When should I call my doctor?    If you are worsening or not improving, please, contact us or seek urgent care as noted below.     Who should I call with questions (adults)?    [facility] (adult and pediatric): [phone]     [facility] (adult): [phone]    For urgent needs outside of business hours call the [facility] at [phone] and ask for the dermatology resident on call    If this is a medical emergency and you are unable to reach an ER, Call 911      Who should I call with questions (pediatric)?  [facility]- Pediatric Dermatology  Dr. Cely Kurtz, Dr. Robb Herring, Dr. Sharona Anguiano, Shanice Oconnor, [place]  Dr. Lima Ball, Dr. Deedee Samano & Dr. Koko Townsend  Non Urgent  Nurse Triage Line; [phone]- Ashley and Katya BRANDON Care Coordinators   [place]tagracia (/Complex ) [phone]    If you need a prescription refill, please contact your pharmacy. Refills are approved or denied by our Physicians during normal business hours, Monday through Fridays  Per office policy, refills will not be granted if you have not been seen within the past year (or sooner depending on your child's condition)    Scheduling Information:  Pediatric Appointment Scheduling and Call Center [phone]  Radiology Scheduling- [phone]  Sedation Unit Scheduling- [phone]  [place] Scheduling- General [phone]; Pediatric Dermatology [phone]  Main  Services: [phone]  [place]: [phone]  [place]:  [phone]  [place]/Tushar/Libyan: [phone]  Preadmission Nursing Department Fax Number: [phone] (Fax all pre-operative paperwork to this number)    For urgent matters arising during evenings, weekends, or holidays that cannot wait for normal business hours please call [phone] and ask for the Dermatology Resident On-Call to be paged.

## 2021-07-20 ENCOUNTER — TELEPHONE (OUTPATIENT)
Dept: DERMATOLOGY | Facility: CLINIC | Age: 31
End: 2021-07-20

## 2021-07-20 NOTE — TELEPHONE ENCOUNTER
Prior Authorization Retail Medication Request    Medication/Dose: Accutane   ICD code (if different than what is on RX):  l70.0  Previously Tried and Failed:    Rationale:      Insurance Name:  [employer]  Insurance ID:  [id number]       Pharmacy Information (if different than what is on RX)  Name:  [facility]  Phone:  [phone]

## 2021-07-20 NOTE — TELEPHONE ENCOUNTER
PA Initiation    Medication: ISOtretinoin (ACCUTANE) 40 MG capsule   Insurance Company: [employer] - Phone [phone] Fax [phone]  Pharmacy Filling the Rx: [id number] - [place] - [address]  Filling Pharmacy Phone: [phone]  Filling Pharmacy Fax: [phone]  Start Date: 7/20/2021

## 2021-07-20 NOTE — TELEPHONE ENCOUNTER
PRIOR AUTHORIZATION DENIED    Medication: ISOtretinoin (ACCUTANE) 40 MG capsule--DENIED    Denial Date: 7/20/2021    Denial Rational: Per Santa at [facility] the patient needs to have at least a 3 month trial of topical tretinoin.  Per Santa patient has only filled once. AND the patient needs to try and fail a oral antibiotic for at least 3 months

## 2021-07-21 ENCOUNTER — TELEPHONE (OUTPATIENT)
Dept: DERMATOLOGY | Facility: CLINIC | Age: 31
End: 2021-07-21

## 2021-07-21 NOTE — TELEPHONE ENCOUNTER
[facility] Call Center    Phone Message    May a detailed message be left on voicemail: yes     Reason for Call: Medication Question or concern regarding medication   Prescription Clarification  Name of Medication: ISOtretinoin (ACCUTANE) 40 MG capsule  Prescribing Provider: Dr. Jiménez   Pharmacy: [id number] - [place], [place] - [address]   What on the order needs clarification? Pt was told she would have to be on Antibiotics for 3 months before getting her Accutane. Pt does not want to mess around taking Antibiotics for 3 months. Please call Pt to discuss. Thank you          Action Taken: Message routed to:  [facility] ([facility] Derm    Travel Screening: Not Applicable

## 2021-07-21 NOTE — TELEPHONE ENCOUNTER
Called and informed pt that insurance will require her to try oral antibiotics before coverage for Isotretinoin will be approved. Patient verbalized understanding and will reach out to us when she has made a decision.

## 2021-07-21 NOTE — TELEPHONE ENCOUNTER
Pt called. States brother is severely allergic to doxycycline so she is wondering if she should take that. Please call to discuss.

## 2021-08-02 ENCOUNTER — TELEPHONE (OUTPATIENT)
Dept: DERMATOLOGY | Facility: CLINIC | Age: 31
End: 2021-08-02

## 2021-08-02 DIAGNOSIS — L70.0 ACNE VULGARIS: Primary | ICD-10-CM

## 2021-08-02 RX ORDER — DOXYCYCLINE 100 MG/1
100 CAPSULE ORAL 2 TIMES DAILY
Qty: 60 CAPSULE | Refills: 2 | Status: SHIPPED | OUTPATIENT
Start: 2021-08-02 | End: 2022-04-20

## 2021-08-02 NOTE — TELEPHONE ENCOUNTER
Patient call me back on my direct line regarding scheduling her follow up, she needs Dr. Jiménez to clarity a few things before scheduling, a message was sent from her via [employer] regarding Anabiotic's to be started first before Acctuane.    Stephanie Humphries  Dermatology Clinic Coordinator  8/1/21

## 2021-10-23 ENCOUNTER — HEALTH MAINTENANCE LETTER (OUTPATIENT)
Age: 31
End: 2021-10-23

## 2021-12-20 ENCOUNTER — OFFICE VISIT (OUTPATIENT)
Dept: URGENT CARE | Facility: URGENT CARE | Age: 31
End: 2021-12-20
Payer: COMMERCIAL

## 2021-12-20 VITALS
DIASTOLIC BLOOD PRESSURE: 71 MMHG | HEIGHT: 66 IN | HEART RATE: 71 BPM | WEIGHT: 196 LBS | TEMPERATURE: 98.4 F | OXYGEN SATURATION: 99 % | BODY MASS INDEX: 31.5 KG/M2 | SYSTOLIC BLOOD PRESSURE: 109 MMHG

## 2021-12-20 DIAGNOSIS — J03.90 TONSILLITIS: Primary | ICD-10-CM

## 2021-12-20 DIAGNOSIS — R07.0 THROAT PAIN: ICD-10-CM

## 2021-12-20 LAB
DEPRECATED S PYO AG THROAT QL EIA: NEGATIVE
GROUP A STREP BY PCR: NOT DETECTED

## 2021-12-20 PROCEDURE — 99203 OFFICE O/P NEW LOW 30 MIN: CPT | Performed by: PHYSICIAN ASSISTANT

## 2021-12-20 PROCEDURE — 87651 STREP A DNA AMP PROBE: CPT | Performed by: PHYSICIAN ASSISTANT

## 2021-12-20 ASSESSMENT — MIFFLIN-ST. JEOR: SCORE: 1630.8

## 2021-12-20 NOTE — PATIENT INSTRUCTIONS
Patient Education     Self-Care for Sore Throats     Sore throats happen for many reasons, such as colds, allergies, cigarette smoke, air pollution, and infections caused by viruses or bacteria. In any case, your throat becomes red and sore. Your goal for self-care is to reduce your discomfort while giving your throat a chance to heal.  Moisten and soothe your throat  Tips include the following:    Try a sip of water first thing after waking up.    Keep your throat moist by drinking 6 or more glasses of clear liquids every day.    Run a cool-air humidifier in your room overnight.    Stay away from cigarette smoke.     Check the air quality index,if air pollution gives you a sore throat. On high pollution days, try to limit outdoor time.    Suck on throat lozenges, cough drops, hard candy, ice chips, or frozen fruit-juice bars. Use the sugar-free versions if your diet or medical condition requires them.  Gargle to ease irritation  Gargling every hour or 2 can ease irritation. Try gargling with 1 of these solutions:    1/4 teaspoon of salt in 1/2 cup of warm water    An over-the-counter anesthetic gargle  Use medicine for more relief  Over-the-counter medicine can reduce sore throat symptoms. Ask your pharmacist if you have questions about which medicine to use. To prevent possible medicine interactions, let the pharmacist know what medicines you take. To decrease symptoms:    Ease pain with anesthetic sprays. Aspirin or an aspirin substitute also helps. Remember, never give aspirin to anyone 18 or younger. Don't take aspirin if you are already taking blood thinners.     For sore throats caused by allergies, try antihistamines to block the allergic reaction.  Unless a sore throat is caused by a bacterial infection, antibiotics won t help you.  Prevent future sore throats  Prevention tips include:    Stop smoking or reduce contact with secondhand smoke. Smoke irritates the tender throat lining.    Limit contact with  pets and with allergy-causing substances, such as pollen and mold.    Wash your hands often when you re around someone with a sore throat or cold. This will keep viruses or bacteria from spreading.    Limit outdoor time when air pollution is bad.    Don t strain your vocal cords.  When to call your healthcare provider  Contact your healthcare provider if you have:    Fever of 100.4 F (38.0 C) or higher, or as directed by your healthcare provider    White spots on the throat    Great Trouble swallowing    A skin rash    Recent exposure to someone else with strep bacteria    Severe hoarseness and swollen glands in the neck or jaw  Call 911  Call 911 if any of the following occur:    Trouble breathing or catching your breath    Drooling and problems swallowing    Wheezing    Unable to talk    Feeling dizzy or faint    Feeling of doom  [employer] last reviewed this educational content on 9/1/2019 2000-2021 [employer]. All rights reserved. This information is not intended as a substitute for professional medical care. Always follow your healthcare professional's instructions.

## 2021-12-20 NOTE — PROGRESS NOTES
Throat pain  - Streptococcus A Rapid Screen w/Reflex to PCR - Clinic Collect  - Group A Streptococcus PCR Throat Swab    Tonsillitis  - amoxicillin-clavulanate (AUGMENTIN) 875-125 MG tablet; Take 1 tablet by mouth 2 times daily for 7 days     See Patient Instructions  Patient Instructions     Patient Education     Self-Care for Sore Throats     Sore throats happen for many reasons, such as colds, allergies, cigarette smoke, air pollution, and infections caused by viruses or bacteria. In any case, your throat becomes red and sore. Your goal for self-care is to reduce your discomfort while giving your throat a chance to heal.  Moisten and soothe your throat  Tips include the following:    Try a sip of water first thing after waking up.    Keep your throat moist by drinking 6 or more glasses of clear liquids every day.    Run a cool-air humidifier in your room overnight.    Stay away from cigarette smoke.     Check the air quality index,if air pollution gives you a sore throat. On high pollution days, try to limit outdoor time.    Suck on throat lozenges, cough drops, hard candy, ice chips, or frozen fruit-juice bars. Use the sugar-free versions if your diet or medical condition requires them.  Gargle to ease irritation  Gargling every hour or 2 can ease irritation. Try gargling with 1 of these solutions:    1/4 teaspoon of salt in 1/2 cup of warm water    An over-the-counter anesthetic gargle  Use medicine for more relief  Over-the-counter medicine can reduce sore throat symptoms. Ask your pharmacist if you have questions about which medicine to use. To prevent possible medicine interactions, let the pharmacist know what medicines you take. To decrease symptoms:    Ease pain with anesthetic sprays. Aspirin or an aspirin substitute also helps. Remember, never give aspirin to anyone 18 or younger. Don't take aspirin if you are already taking blood thinners.     For sore throats caused by allergies, try antihistamines  to block the allergic reaction.  Unless a sore throat is caused by a bacterial infection, antibiotics won t help you.  Prevent future sore throats  Prevention tips include:    Stop smoking or reduce contact with secondhand smoke. Smoke irritates the tender throat lining.    Limit contact with pets and with allergy-causing substances, such as pollen and mold.    Wash your hands often when you re around someone with a sore throat or cold. This will keep viruses or bacteria from spreading.    Limit outdoor time when air pollution is bad.    Don t strain your vocal cords.  When to call your healthcare provider  Contact your healthcare provider if you have:    Fever of 100.4 F (38.0 C) or higher, or as directed by your healthcare provider    White spots on the throat    Great Trouble swallowing    A skin rash    Recent exposure to someone else with strep bacteria    Severe hoarseness and swollen glands in the neck or jaw  Call 911  Call 911 if any of the following occur:    Trouble breathing or catching your breath    Drooling and problems swallowing    Wheezing    Unable to talk    Feeling dizzy or faint    Feeling of doom  [employer] last reviewed this educational content on 9/1/2019 2000-2021 [employer]. All rights reserved. This information is not intended as a substitute for professional medical care. Always follow your healthcare professional's instructions.               Hima Moyer PA-C  [facility]    Subjective   31 year old who presents to clinic today for the following health issues:    Urgent Care and Throat Pain       HPI     Acute Illness  Acute illness concerns: sore throat   Onset/Duration: this morning   Symptoms:  Fever: no  Chills/Sweats: no  Headache (location?): no  Sinus Pressure: no  Conjunctivitis:  no  Ear Pain: no  Rhinorrhea: no  Congestion: no  Sore Throat: YES (7/10)- Patient has a history of tonsillitis.   Cough: no  Wheeze: no  Decreased Appetite:  no  Nausea: no  Vomiting: no  Diarrhea: YES  Dysuria/Freq.: no  Dysuria or Hematuria: no  Fatigue/Achiness: no  Sick/Strep Exposure: Bosses kid had strep   Therapies tried and outcome: Dayquil      Patient has a long history of tonsil stone and tonsillitis.     Review of Systems   Review of Systems   See HPI     Objective    Temp: 98.4  F (36.9  C) Temp src: Temporal BP: 109/71 Pulse: 71     SpO2: 99 %       Physical Exam   Physical Exam  Constitutional:       General: She is not in acute distress.     Appearance: Normal appearance. She is normal weight. She is not ill-appearing, toxic-appearing or diaphoretic.   HENT:      Head: Normocephalic and atraumatic.      Mouth/Throat:      Pharynx: Pharyngeal swelling, posterior oropharyngeal erythema and uvula swelling present. No oropharyngeal exudate.      Tonsils: Tonsillar exudate present. No tonsillar abscesses.   Cardiovascular:      Rate and Rhythm: Normal rate.      Pulses: Normal pulses.   Pulmonary:      Effort: Pulmonary effort is normal. No respiratory distress.   Neurological:      General: No focal deficit present.      Mental Status: She is alert and oriented to person, place, and time. Mental status is at baseline.   Psychiatric:         Mood and Affect: Mood normal.         Behavior: Behavior normal.         Thought Content: Thought content normal.         Judgment: Judgment normal.          Results for orders placed or performed in visit on 12/20/21 (from the past 24 hour(s))   Streptococcus A Rapid Screen w/Reflex to PCR - Clinic Collect    Specimen: Throat; Swab   Result Value Ref Range    Group A Strep antigen Negative Negative

## 2022-04-09 ENCOUNTER — HEALTH MAINTENANCE LETTER (OUTPATIENT)
Age: 32
End: 2022-04-09

## 2022-04-19 NOTE — PROGRESS NOTES
SUBJECTIVE:   CC: Catalina Foster is an 31 year old woman who presents for preventive health visit.     Healthy Habits:     Getting at least 3 servings of Calcium per day:  Yes    Bi-annual eye exam:  NO    Dental care twice a year:  Yes    Sleep apnea or symptoms of sleep apnea:  None    Diet:  Regular (no restrictions) and Vegetarian/vegan    Frequency of exercise:  2-3 days/week    Duration of exercise:  15-30 minutes    Taking medications regularly:  Yes    Medication side effects:  None    PHQ-2 Total Score: 0    Additional concerns today:  Yes             PROBLEMS TO ADD ON...    Today's PHQ-2 Score:   PHQ-2 ( 1999 Pfizer) 4/20/2022   Q1: Little interest or pleasure in doing things 0   Q2: Feeling down, depressed or hopeless 0   PHQ-2 Score 0   PHQ-2 Total Score (12-17 Years)- Positive if 3 or more points; Administer PHQ-A if positive -   Q1: Little interest or pleasure in doing things Not at all   Q2: Feeling down, depressed or hopeless Not at all   PHQ-2 Score 0       Abuse: Current or Past (Physical, Sexual or Emotional) - No  Do you feel safe in your environment? Yes    Have you ever done Advance Care Planning? (For example, a Health Directive, POLST, or a discussion with a medical provider or your loved ones about your wishes): No, advance care planning information given to patient to review.  Patient declined advance care planning discussion at this time.    Social History     Tobacco Use     Smoking status: Never Smoker     Smokeless tobacco: Never Used   Substance Use Topics     Alcohol use: Not Currently     If you drink alcohol do you typically have >3 drinks per day or >7 drinks per week? No    Alcohol Use 4/20/2022   Prescreen: >3 drinks/day or >7 drinks/week? No   Prescreen: >3 drinks/day or >7 drinks/week? -   No flowsheet data found.    Reviewed orders with patient.  Reviewed health maintenance and updated orders accordingly - Yes  Labs reviewed in EPIC    Breast Cancer Screening:  Any new  "diagnosis of family breast, ovarian, or bowel cancer? No    Patient under 40 years of age: Routine Mammogram Screening not recommended.   Pertinent mammograms are reviewed under the imaging tab.    History of abnormal Pap smear: NO - age 30-65 PAP every 5 years with negative HPV co-testing recommended  PAP / HPV Latest Ref Rng & Units 4/20/2022   PAP   Negative for Intraepithelial Lesion or Malignancy (NILM)     Reviewed and updated as needed this visit by clinical staff   Tobacco  Allergies  Meds     Fam Hx            Reviewed and updated as needed this visit by Provider     Meds     Fam Hx               Review of Systems  CONSTITUTIONAL: NEGATIVE for fever, chills, change in weight  INTEGUMENTARU/SKIN: NEGATIVE for worrisome rashes, moles or lesions  EYES: NEGATIVE for vision changes or irritation  ENT: NEGATIVE for ear, mouth and throat problems  RESP: NEGATIVE for significant cough or SOB  BREAST: NEGATIVE for masses, tenderness or discharge  CV: NEGATIVE for chest pain, palpitations or peripheral edema  GI: NEGATIVE for nausea, abdominal pain, heartburn, or change in bowel habits  : NEGATIVE for unusual urinary or vaginal symptoms. Periods are regular.  MUSCULOSKELETAL: NEGATIVE for significant arthralgias or myalgia  NEURO: NEGATIVE for weakness, dizziness or paresthesias  PSYCHIATRIC: NEGATIVE for changes in mood or affect     OBJECTIVE:   /69   Pulse 65   Temp 98.6  F (37  C) (Tympanic)   Resp 16   Ht 1.676 m (5' 6\")   Wt 89.8 kg (198 lb)   SpO2 99%   BMI 31.96 kg/m    Physical Exam  GENERAL: healthy, alert and no distress  EYES: Eyes grossly normal to inspection, PERRL and conjunctivae and sclerae normal  HENT: ear canals and TM's normal, nose and mouth without ulcers or lesions  NECK: no adenopathy, no asymmetry, masses, or scars and thyroid normal to palpation  RESP: lungs clear to auscultation - no rales, rhonchi or wheezes  BREAST: normal without masses, tenderness or nipple " discharge and no palpable axillary masses or adenopathy  CV: regular rate and rhythm, normal S1 S2, no S3 or S4, no murmur, click or rub, no peripheral edema and peripheral pulses strong  ABDOMEN: soft, nontender, no hepatosplenomegaly, no masses and bowel sounds normal   (female): normal female external genitalia, normal urethral meatus, vaginal mucosa pink, moist, well rugated, and normal cervix/adnexa/uterus without masses or discharge  MS: no gross musculoskeletal defects noted, no edema  SKIN: no suspicious lesions or rashes  NEURO: Normal strength and tone, mentation intact and speech normal  PSYCH: mentation appears normal, affect normal/bright    Diagnostic Test Results:  Labs reviewed in Epic    ASSESSMENT/PLAN:       ICD-10-CM    1. Routine general medical examination at a health care facility  Z00.00 Lipid panel reflex to direct LDL Fasting     Comprehensive metabolic panel     CBC with platelets     Hemoglobin A1c     Lipid panel reflex to direct LDL Fasting     Comprehensive metabolic panel     CBC with platelets     Hemoglobin A1c   2. Cystic acne  L70.0 Adult Dermatology Referral   3. Calculus of tonsil  J35.8 Otolaryngology Referral   4. Gastroesophageal reflux disease without esophagitis  K21.9 Helicobacter pylori Antigen Stool     Helicobacter pylori Antigen Stool   5. Screening for human immunodeficiency virus without presence of risk factors  Z11.4 HIV Antigen Antibody Combo     HIV Antigen Antibody Combo   6. Encounter for hepatitis C screening test for low risk patient  Z11.59 Hepatitis C antibody     Hepatitis C antibody   7. Screening for malignant neoplasm of cervix  Z12.4 Pap screen reflex to HPV if ASCUS - recommend age 25 - 29     HPV Hold (Lab Only)       Patient has been advised of split billing requirements and indicates understanding: Yes    COUNSELING:  Reviewed preventive health counseling, as reflected in patient instructions       Regular exercise       Healthy  "diet/nutrition    Estimated body mass index is 31.96 kg/m  as calculated from the following:    Height as of this encounter: 1.676 m (5' 6\").    Weight as of this encounter: 89.8 kg (198 lb).    She reports that she has never smoked. She has never used smokeless tobacco.      Counseling Resources:  ATP IV Guidelines  Pooled Cohorts Equation Calculator  Breast Cancer Risk Calculator  BRCA-Related Cancer Risk Assessment: FHS-7 Tool  FRAX Risk Assessment  ICSI Preventive Guidelines  Dietary Guidelines for Americans, 2010  USDA's MyPlate  ASA Prophylaxis  Lung CA Screening    Estefany Pham MD  [facility]  "

## 2022-04-20 ENCOUNTER — OFFICE VISIT (OUTPATIENT)
Dept: FAMILY MEDICINE | Facility: CLINIC | Age: 32
End: 2022-04-20
Payer: COMMERCIAL

## 2022-04-20 VITALS
SYSTOLIC BLOOD PRESSURE: 107 MMHG | OXYGEN SATURATION: 99 % | RESPIRATION RATE: 16 BRPM | DIASTOLIC BLOOD PRESSURE: 69 MMHG | BODY MASS INDEX: 31.82 KG/M2 | WEIGHT: 198 LBS | HEART RATE: 65 BPM | HEIGHT: 66 IN | TEMPERATURE: 98.6 F

## 2022-04-20 DIAGNOSIS — Z00.00 ROUTINE GENERAL MEDICAL EXAMINATION AT A HEALTH CARE FACILITY: Primary | ICD-10-CM

## 2022-04-20 DIAGNOSIS — J35.8 CALCULUS OF TONSIL: ICD-10-CM

## 2022-04-20 DIAGNOSIS — K21.9 GASTROESOPHAGEAL REFLUX DISEASE WITHOUT ESOPHAGITIS: ICD-10-CM

## 2022-04-20 DIAGNOSIS — Z12.4 SCREENING FOR MALIGNANT NEOPLASM OF CERVIX: ICD-10-CM

## 2022-04-20 DIAGNOSIS — L70.0 CYSTIC ACNE: ICD-10-CM

## 2022-04-20 DIAGNOSIS — Z11.59 ENCOUNTER FOR HEPATITIS C SCREENING TEST FOR LOW RISK PATIENT: ICD-10-CM

## 2022-04-20 DIAGNOSIS — Z11.4 SCREENING FOR HUMAN IMMUNODEFICIENCY VIRUS WITHOUT PRESENCE OF RISK FACTORS: ICD-10-CM

## 2022-04-20 LAB
ALBUMIN SERPL-MCNC: 4 G/DL (ref 3.4–5)
ALP SERPL-CCNC: 55 U/L (ref 40–150)
ALT SERPL W P-5'-P-CCNC: 23 U/L (ref 0–50)
ANION GAP SERPL CALCULATED.3IONS-SCNC: 7 MMOL/L (ref 3–14)
AST SERPL W P-5'-P-CCNC: 16 U/L (ref 0–45)
BILIRUB SERPL-MCNC: 0.5 MG/DL (ref 0.2–1.3)
BUN SERPL-MCNC: 8 MG/DL (ref 7–30)
CALCIUM SERPL-MCNC: 9.4 MG/DL (ref 8.5–10.1)
CHLORIDE BLD-SCNC: 105 MMOL/L (ref 94–109)
CHOLEST SERPL-MCNC: 206 MG/DL
CO2 SERPL-SCNC: 23 MMOL/L (ref 20–32)
CREAT SERPL-MCNC: 0.62 MG/DL (ref 0.52–1.04)
ERYTHROCYTE [DISTWIDTH] IN BLOOD BY AUTOMATED COUNT: 11.8 % (ref 10–15)
FASTING STATUS PATIENT QL REPORTED: NO
GFR SERPL CREATININE-BSD FRML MDRD: >90 ML/MIN/1.73M2
GLUCOSE BLD-MCNC: 89 MG/DL (ref 70–99)
HBA1C MFR BLD: 5.2 % (ref 0–5.6)
HCT VFR BLD AUTO: 37.2 % (ref 35–47)
HCV AB SERPL QL IA: NONREACTIVE
HDLC SERPL-MCNC: 69 MG/DL
HGB BLD-MCNC: 12.6 G/DL (ref 11.7–15.7)
HIV 1+2 AB+HIV1 P24 AG SERPL QL IA: NONREACTIVE
LDLC SERPL CALC-MCNC: 111 MG/DL
MCH RBC QN AUTO: 30 PG (ref 26.5–33)
MCHC RBC AUTO-ENTMCNC: 33.9 G/DL (ref 31.5–36.5)
MCV RBC AUTO: 89 FL (ref 78–100)
NONHDLC SERPL-MCNC: 137 MG/DL
PLATELET # BLD AUTO: 302 10E3/UL (ref 150–450)
POTASSIUM BLD-SCNC: 3.9 MMOL/L (ref 3.4–5.3)
PROT SERPL-MCNC: 7.5 G/DL (ref 6.8–8.8)
RBC # BLD AUTO: 4.2 10E6/UL (ref 3.8–5.2)
SODIUM SERPL-SCNC: 135 MMOL/L (ref 133–144)
TRIGL SERPL-MCNC: 129 MG/DL
WBC # BLD AUTO: 5.8 10E3/UL (ref 4–11)

## 2022-04-20 PROCEDURE — 80061 LIPID PANEL: CPT | Performed by: FAMILY MEDICINE

## 2022-04-20 PROCEDURE — 87338 HPYLORI STOOL AG IA: CPT | Performed by: FAMILY MEDICINE

## 2022-04-20 PROCEDURE — 83036 HEMOGLOBIN GLYCOSYLATED A1C: CPT | Performed by: FAMILY MEDICINE

## 2022-04-20 PROCEDURE — 80053 COMPREHEN METABOLIC PANEL: CPT | Performed by: FAMILY MEDICINE

## 2022-04-20 PROCEDURE — 87389 HIV-1 AG W/HIV-1&-2 AB AG IA: CPT | Performed by: FAMILY MEDICINE

## 2022-04-20 PROCEDURE — 36415 COLL VENOUS BLD VENIPUNCTURE: CPT | Performed by: FAMILY MEDICINE

## 2022-04-20 PROCEDURE — 85027 COMPLETE CBC AUTOMATED: CPT | Performed by: FAMILY MEDICINE

## 2022-04-20 PROCEDURE — 99395 PREV VISIT EST AGE 18-39: CPT | Performed by: FAMILY MEDICINE

## 2022-04-20 PROCEDURE — 86803 HEPATITIS C AB TEST: CPT | Performed by: FAMILY MEDICINE

## 2022-04-20 PROCEDURE — G0145 SCR C/V CYTO,THINLAYER,RESCR: HCPCS | Performed by: FAMILY MEDICINE

## 2022-04-20 ASSESSMENT — ENCOUNTER SYMPTOMS
JOINT SWELLING: 0
DIZZINESS: 0
CHILLS: 0
WEAKNESS: 0
EYE PAIN: 0
SHORTNESS OF BREATH: 0
SORE THROAT: 0
COUGH: 0
PARESTHESIAS: 0
BREAST MASS: 0
FEVER: 0
CONSTIPATION: 0
ARTHRALGIAS: 0
HEARTBURN: 1
NERVOUS/ANXIOUS: 0
MYALGIAS: 0
ABDOMINAL PAIN: 0
HEADACHES: 0
DYSURIA: 0
HEMATOCHEZIA: 0
HEMATURIA: 0
NAUSEA: 0
PALPITATIONS: 0
FREQUENCY: 0
DIARRHEA: 0

## 2022-04-20 NOTE — NURSING NOTE
"Chief Complaint   Patient presents with     Physical     initial /69   Pulse 65   Temp 98.6  F (37  C) (Tympanic)   Resp 16   Ht 1.676 m (5' 6\")   Wt 89.8 kg (198 lb)   SpO2 99%   BMI 31.96 kg/m   Estimated body mass index is 31.96 kg/m  as calculated from the following:    Height as of this encounter: 1.676 m (5' 6\").    Weight as of this encounter: 89.8 kg (198 lb).  BP completed using cuff size: regular.  R arm      Health Maintenance that is potentially due pending provider review:  NONE    n/a    Rafael Melendez ma  "

## 2022-04-22 LAB
BKR LAB AP GYN ADEQUACY: NORMAL
BKR LAB AP GYN INTERPRETATION: NORMAL
BKR LAB AP HPV REFLEX: NORMAL
BKR LAB AP PREVIOUS ABNORMAL: NORMAL
PATH REPORT.COMMENTS IMP SPEC: NORMAL
PATH REPORT.COMMENTS IMP SPEC: NORMAL
PATH REPORT.RELEVANT HX SPEC: NORMAL

## 2022-04-25 LAB — H PYLORI AG STL QL IA: NEGATIVE

## 2022-05-03 ENCOUNTER — HOSPITAL ENCOUNTER (EMERGENCY)
Facility: CLINIC | Age: 32
Discharge: HOME | End: 2022-05-03
Attending: EMERGENCY MEDICINE | Admitting: EMERGENCY MEDICINE
Payer: COMMERCIAL

## 2022-05-03 ENCOUNTER — APPOINTMENT (OUTPATIENT)
Dept: ULTRASOUND IMAGING | Facility: CLINIC | Age: 32
End: 2022-05-03
Attending: EMERGENCY MEDICINE
Payer: COMMERCIAL

## 2022-05-03 VITALS
HEART RATE: 60 BPM | OXYGEN SATURATION: 99 % | SYSTOLIC BLOOD PRESSURE: 108 MMHG | WEIGHT: 198 LBS | TEMPERATURE: 98.1 F | DIASTOLIC BLOOD PRESSURE: 76 MMHG | RESPIRATION RATE: 14 BRPM | BODY MASS INDEX: 31.96 KG/M2

## 2022-05-03 VITALS
HEART RATE: 94 BPM | RESPIRATION RATE: 17 BRPM | BODY MASS INDEX: 31.82 KG/M2 | SYSTOLIC BLOOD PRESSURE: 107 MMHG | TEMPERATURE: 97.9 F | DIASTOLIC BLOOD PRESSURE: 65 MMHG | WEIGHT: 198 LBS | HEIGHT: 66 IN | OXYGEN SATURATION: 100 %

## 2022-05-03 DIAGNOSIS — N76.4 LABIAL ABSCESS: ICD-10-CM

## 2022-05-03 DIAGNOSIS — N94.89 LABIAL SWELLING: ICD-10-CM

## 2022-05-03 LAB
HCG UR QL: NEGATIVE
HOLD SPECIMEN: NORMAL

## 2022-05-03 PROCEDURE — 76882 US LMTD JT/FCL EVL NVASC XTR: CPT | Mod: 26 | Performed by: EMERGENCY MEDICINE

## 2022-05-03 PROCEDURE — 76882 US LMTD JT/FCL EVL NVASC XTR: CPT | Mod: LT

## 2022-05-03 PROCEDURE — 99285 EMERGENCY DEPT VISIT HI MDM: CPT | Mod: 25 | Performed by: EMERGENCY MEDICINE

## 2022-05-03 PROCEDURE — 250N000013 HC RX MED GY IP 250 OP 250 PS 637: Performed by: EMERGENCY MEDICINE

## 2022-05-03 PROCEDURE — 99284 EMERGENCY DEPT VISIT MOD MDM: CPT | Mod: 25 | Performed by: EMERGENCY MEDICINE

## 2022-05-03 PROCEDURE — 250N000009 HC RX 250: Performed by: STUDENT IN AN ORGANIZED HEALTH CARE EDUCATION/TRAINING PROGRAM

## 2022-05-03 PROCEDURE — 81025 URINE PREGNANCY TEST: CPT | Performed by: EMERGENCY MEDICINE

## 2022-05-03 PROCEDURE — 76882 US LMTD JT/FCL EVL NVASC XTR: CPT | Performed by: EMERGENCY MEDICINE

## 2022-05-03 PROCEDURE — 56405 I&D VULVA/PERINEAL ABSCESS: CPT | Mod: GC | Performed by: OBSTETRICS & GYNECOLOGY

## 2022-05-03 PROCEDURE — 76882 US LMTD JT/FCL EVL NVASC XTR: CPT | Mod: 26 | Performed by: STUDENT IN AN ORGANIZED HEALTH CARE EDUCATION/TRAINING PROGRAM

## 2022-05-03 PROCEDURE — 99203 OFFICE O/P NEW LOW 30 MIN: CPT | Mod: 25 | Performed by: OBSTETRICS & GYNECOLOGY

## 2022-05-03 PROCEDURE — 2894A VOIDCORRECT: CPT | Mod: 26 | Performed by: EMERGENCY MEDICINE

## 2022-05-03 PROCEDURE — 87070 CULTURE OTHR SPECIMN AEROBIC: CPT | Performed by: STUDENT IN AN ORGANIZED HEALTH CARE EDUCATION/TRAINING PROGRAM

## 2022-05-03 PROCEDURE — 87075 CULTR BACTERIA EXCEPT BLOOD: CPT | Performed by: STUDENT IN AN ORGANIZED HEALTH CARE EDUCATION/TRAINING PROGRAM

## 2022-05-03 PROCEDURE — 56405 I&D VULVA/PERINEAL ABSCESS: CPT | Performed by: EMERGENCY MEDICINE

## 2022-05-03 RX ORDER — OXYCODONE AND ACETAMINOPHEN 5; 325 MG/1; MG/1
1 TABLET ORAL EVERY 6 HOURS PRN
Qty: 12 TABLET | Refills: 0 | Status: SHIPPED | OUTPATIENT
Start: 2022-05-03 | End: 2022-05-06

## 2022-05-03 RX ORDER — OXYCODONE AND ACETAMINOPHEN 5; 325 MG/1; MG/1
1 TABLET ORAL ONCE
Status: COMPLETED | OUTPATIENT
Start: 2022-05-03 | End: 2022-05-03

## 2022-05-03 RX ORDER — LIDOCAINE HYDROCHLORIDE 10 MG/ML
10 INJECTION, SOLUTION EPIDURAL; INFILTRATION; INTRACAUDAL; PERINEURAL ONCE
Status: COMPLETED | OUTPATIENT
Start: 2022-05-03 | End: 2022-05-03

## 2022-05-03 RX ADMIN — OXYCODONE HYDROCHLORIDE AND ACETAMINOPHEN 1 TABLET: 5; 325 TABLET ORAL at 13:33

## 2022-05-03 RX ADMIN — OXYCODONE HYDROCHLORIDE AND ACETAMINOPHEN 1 TABLET: 5; 325 TABLET ORAL at 09:18

## 2022-05-03 RX ADMIN — LIDOCAINE HYDROCHLORIDE 5 ML: 10 INJECTION, SOLUTION EPIDURAL; INFILTRATION; INTRACAUDAL; PERINEURAL at 18:27

## 2022-05-03 RX ADMIN — OXYCODONE HYDROCHLORIDE AND ACETAMINOPHEN 1 TABLET: 5; 325 TABLET ORAL at 17:49

## 2022-05-03 ASSESSMENT — ENCOUNTER SYMPTOMS
DYSURIA: 0
SHORTNESS OF BREATH: 0
ABDOMINAL PAIN: 0
FEVER: 0
HEMATURIA: 0
FEVER: 0

## 2022-05-03 NOTE — CONSULTS
Gynecology Consult Note    Assessment/Recommendations:   Catalina Foster is a 31 year old G0 with a left labial abscess. Unclear etiologyDiscussed management options including expectant management with pain control at home, bedside drainage with local anesthesia, versus EUA with drainage. Patient elected to proceed with bedside drainage. (See procedure note below), tolerated well with improvement in pain post-procedure.  - Discussed reasons to call or come back: heavy bleeding, re-accumulation of fluid/edema, fever/chills, spreading erythema. Patient provided with clinic contact information for follow up  - No evidence of cellulitis, no antibiotics indicated. Will follow up cultures and treat if needed pending results.     Patient seen with and discussed with Dr. Trish Pinzon MD, MPH  OBGYN PGY-4  5:35 PM 5/3/2022    Physician Attestation   I, Pam Chambers MD, saw this patient with the resident and agree with the resident/fellow's findings and plan of care as documented in the note.      I personally reviewed vital signs, medications, labs, imaging and procedure. .    Key findings: Fluctuant abscess.   I was present for procedure.     Anticpate improvement with drainage. Antibiotics if not improved.     Pam Chambers MD  Date of Service (when I saw the patient): 05/03/22      HPI: Catalina Foster is a 31 year old who presented with acute onset of left labial swelling and pain. She denies any known trauma to the vulva. She denies shaving. There is no drainage from the area. She denies any bleeding, dysuria, fever, chills. She has been on her feet, working a lot the past few days, more active than usual. She has never had pain/swelling of her vulva like this before. No history of gonorrhea or chlamydia.     OBHx: G0    GynHx: No LMP recorded. (Menstrual status: IUD).  Sexual activity- one male partner  Last pap- 11/27/19, NILM  Abnormal paps- None  STIs- Denies  Contraception Hx: Mirena  IUD in place    PMH:   Acne  Migraine headaches    PSH:   Past Surgical History:   Procedure Laterality Date     NASAL SEPTUM SURGERY      06/2021       Social Hx:   Social History     Tobacco Use     Smoking status: Never Smoker     Smokeless tobacco: Never Used   Substance Use Topics     Alcohol use: Not Currently     Drug use: Yes     Types: Marijuana     Comment: occasional        Family History: family history includes Alzheimer Disease in her maternal grandmother; Dementia in her maternal grandmother; Heart Disease in her father; Hypertension in her father; Ovarian cysts in her mother; Pneumonia in her paternal grandmother.    ROS:   Negative except per HPI    Objective:   /70   Pulse 52   Temp 98.3  F (36.8  C) (Oral)   Resp 14   Wt 89.8 kg (198 lb)   SpO2 99%   BMI 31.96 kg/m    Constitutional: Healthy appearing female, no acute distress  Cardiovascular: Regular rate, well perfused  Respiratory: Breathing comfortably on room air  Abdomen: soft, non-tender. BS normal. No masses, organomegaly  Pelvic Exam - External genitalia with normal hair distribution. Normal clitoris. Left labia minora with 1.5 cm fluctuant mass with some mild erythema, no erythema that is spreading from the lesion itself. No drainage. Normal bilateral labia majora.     Procedure:   Labia was cleansed with betadine. 1 ml of 1% plain lidocaine was injected into the inferior lateral edge of the abscess. Purulent fluid coming from puncture site from hypodermic needle. Purulent material expressed and collected for culture and puncture site was extended slightly superiorly with an 11 blade scalpel (3 mm incision). The remainder of the material was expressed. Wound was rinsed with saline and pressure applied, hemostasis achieved.     Patient tolerated the procedure well. Dr. Chambers was present for the entire procedure.     Labs/Imaging:  Results for orders placed or performed during the hospital encounter of 05/03/22   POC US SOFT  TISSUE     Status: None    Impression    Limited Soft Tissue Ultrasound, performed and interpreted by me.    Indication:  Pain, swelling. Evaluate for cellulitis vs abscess.     Body location: perineum, left labial skin    Findings:  There is no cobblestoning suggestive of cellulitis in the evaluated area. There is a fluid collection measuring 1 cm to suggest possible abscess vs other fluid collection. No foreign body identified    IMPRESSION: fluid collection, unclear if abscess           US Lower Extremity Non Vascular Left     Status: None    Narrative    Exam: US LOWER EXTREMITY NON VASCULAR LEFT, 5/3/2022 11:14 AM    Indication: please check for fluid collection on left labia    Comparison: None    Findings:   Focused grayscale and color Doppler evaluation of the left labia  demonstrates peripheral anechoic lesion likely representing fluid  collection measuring 1.5 x 0.6 x 0.6 cm with adjacent perilesional  increased vascularity on color Doppler      Impression    Impression: Heterogenous predominantly anechoic lesion in the  superficial left labia likely representing fluid  collection/inflammatory lesion measuring up to 1.5 cm.    I have personally reviewed the examination and initial interpretation  and I agree with the findings.    MARILIN SHAY MD         SYSTEM ID:  [id number]   [facility] Draw     Status: None    Narrative    The following orders were created for panel order [facility] Draw.  Procedure                               Abnormality         Status                     ---------                               -----------         ------                     Extra Blue Top Tube[[id number]]                              Final result               Extra Red Top Tube[[id number]]                               Final result               Extra Green Top (Lithium...[[id number]]                      Final result               Extra Purple Top Tube[[id number]]                            Final result                  Please view results for these tests on the individual orders.   Extra Blue Top Tube     Status: None   Result Value Ref Range    Hold Specimen JIC    Extra Red Top Tube     Status: None   Result Value Ref Range    Hold Specimen JIC    Extra Green Top (Lithium Heparin) Tube     Status: None   Result Value Ref Range    Hold Specimen JIC    Extra Purple Top Tube     Status: None   Result Value Ref Range    Hold Specimen JIC    HCG qualitative urine     Status: Normal   Result Value Ref Range    hCG Urine Qualitative Negative Negative

## 2022-05-03 NOTE — ED TRIAGE NOTES
Patient presents due to cyst on labia. Patient was at the [place] ED today when they recommended patient could come here and be seen by OB GYN or she could make an appointment to follow up at a later date. Patient has been in an extreme pain and would like to see someone today. Patient reports stabbing 5/10 pain currently after taking percocet before arrival.     Triage Assessment     Row Name 05/03/22 1449       Triage Assessment (Adult)    Airway WDL WDL       Respiratory WDL    Respiratory WDL WDL       Skin Circulation/Temperature WDL    Skin Circulation/Temperature WDL X  cyst located on labia       Cardiac WDL    Cardiac WDL WDL       Peripheral/Neurovascular WDL    Peripheral Neurovascular WDL WDL       Cognitive/Neuro/Behavioral WDL    Cognitive/Neuro/Behavioral WDL WDL

## 2022-05-03 NOTE — DISCHARGE INSTRUCTIONS
Please follow up with OB/GYN if continuing to have any swelling or discomfort in the labia after several days of healing.      Take ibuprofen and tylenol as needed for pain.     Return to the ER if symptoms worsen, or for any fevers.

## 2022-05-03 NOTE — ED PROVIDER NOTES
[facility] EMERGENCY DEPARTMENT ([facility])  5/03/22   Vertical Triage A   9:09 AM   History     Chief Complaint   Patient presents with     Vaginal Pain     Left labia cyst/boil?     The history is provided by the patient and medical records.     Catalina Foster is a 31 year old female who presents with a firm, painful, left labial boil.  Symptoms first started on Sunday, 2 days ago and she tried hot packing it. Last night the pain became severe, 10/10, sharp and so presents for evaluation. She has history of labial boils for which she uses topical clindamycin. She states it's not a herpetic lesion, that her doctor diagnosed them as boils. She states that the boils in the past were not painful, and that this boil today is severely painful. No fevers. No concerns for STI. She had annual checkup 2 weeks ago including some STIs, all negative. No nausea, vomiting, abdominal pain, no dysuria, vaginal discharge or itching. She is able to eat and drink ok. No chest pain, shortness of breath, fever or other symptoms. She has not taken any ibuprofen or Tylenol for this. She would like something for pain. Can't take clindamycin by mouth but topical is ok. She has had Percocet in the past. She did not drive here.  Patient has an IUD, does not get regular menstrual periods.    Past Medical History  No past medical history on file.  Past Surgical History:   Procedure Laterality Date     NASAL SEPTUM SURGERY      06/2021     oxyCODONE-acetaminophen (PERCOCET) 5-325 MG tablet  levonorgestrel (MIRENA) 20 MCG/24HR IUD  Probiotic Product (UP4 PROBIOTICS WOMENS) CAPS      Allergies   Allergen Reactions     Clindamycin Hives and Rash     Orally, topical OK  Orally, topical OK       Family History  Family History   Problem Relation Age of Onset     Ovarian cysts Mother      Heart Disease Father      Hypertension Father      Dementia Maternal Grandmother      Alzheimer Disease Maternal Grandmother      Pneumonia Paternal  "Grandmother      Social History   Social History     Tobacco Use     Smoking status: Never Smoker     Smokeless tobacco: Never Used   Substance Use Topics     Alcohol use: Not Currently     Drug use: Never      Past medical history, past surgical history, medications, allergies, family history, and social history were reviewed with the patient. No additional pertinent items.       Review of Systems   Constitutional: Negative for fever.   Genitourinary:        Labial boil     A complete review of systems was performed with pertinent positives and negatives noted in the HPI, and all other systems negative.    Physical Exam   BP: 135/68  Pulse: 94  Temp: 97.9  F (36.6  C)  Resp: 17  Height: 167.6 cm (5' 6\")  Weight: 89.8 kg (198 lb)  SpO2: 99 %  Physical Exam    GEN:  Well developed, no acute distress  HEENT:  EOMI, Mucous membranes are moist.   Cardio:  RRR, no murmur  PULM:  Lungs clear, good air movement, no wheezes, rales   Abd:  Soft, normal bowel sounds, no focal tenderness   Exam:  No active bleeding, there is a while discharge, Left labia majora has a swelling along the outside of the labia, lateral to the labia, in the middle 1/3 of the labia from anterior to posterior. Estimated to be 1 cm in length.  No drainage or open sores. No blistering. This area is firm and tender. No increased redness.   Musculoskeletal:  normal range of motion, no lower extremity swelling or calf tenderness  Neuro:  Alert and oriented X3, Follows commands, moving all extremities spontaneously   Skin:  Warm, dry    ED Course      Procedures  Results for orders placed during the hospital encounter of 05/03/22    POC US SOFT TISSUE    Impression  Limited Soft Tissue Ultrasound, performed and interpreted by me.    Indication:  Pain, swelling. Evaluate for cellulitis vs abscess.    Body location: perineum, left labial skin    Findings:  There is no cobblestoning suggestive of cellulitis in the evaluated area. There is a fluid collection " measuring 1 cm to suggest possible abscess vs other fluid collection. No foreign body identified    IMPRESSION: fluid collection, unclear if abscess    She was given percocet for pain.   Gyn consult paged at 9:45.  The gynecology service is in the operating room and unable to see her at the time of the initial call.  They asked for ultrasound.  When I heard back from gynecology after 1 PM, they explained that their attending is not able to come to the [facility] ED because of need to cover [facility] at all times.  They advised that the patient can follow-up in the gynecology clinic.  Patient is not febrile, has no signs of systemic infection, I think is stable for outpatient evaluation.  Patient is having significant pain and would like to be seen today.         Results for orders placed or performed during the hospital encounter of 05/03/22   POC US SOFT TISSUE     Status: None    Impression    Limited Soft Tissue Ultrasound, performed and interpreted by me.    Indication:  Pain, swelling. Evaluate for cellulitis vs abscess.     Body location: perineum, left labial skin    Findings:  There is no cobblestoning suggestive of cellulitis in the evaluated area. There is a fluid collection measuring 1 cm to suggest possible abscess vs other fluid collection. No foreign body identified    IMPRESSION: fluid collection, unclear if abscess           US Lower Extremity Non Vascular Left     Status: None    Narrative    Exam: US LOWER EXTREMITY NON VASCULAR LEFT, 5/3/2022 11:14 AM    Indication: please check for fluid collection on left labia    Comparison: None    Findings:   Focused grayscale and color Doppler evaluation of the left labia  demonstrates peripheral anechoic lesion likely representing fluid  collection measuring 1.5 x 0.6 x 0.6 cm with adjacent perilesional  increased vascularity on color Doppler      Impression    Impression: Heterogenous predominantly anechoic lesion in the  superficial left labia likely  representing fluid  collection/inflammatory lesion measuring up to 1.5 cm.    I have personally reviewed the examination and initial interpretation  and I agree with the findings.    MARILIN SHAY MD         SYSTEM ID:  [id number]   [place] Draw     Status: None    Narrative    The following orders were created for panel order [facility] [place] Draw.  Procedure                               Abnormality         Status                     ---------                               -----------         ------                     Extra Blue Top Tube[[id number]]                              Final result               Extra Red Top Tube[[id number]]                               Final result               Extra Green Top (Lithium...[[id number]]                      Final result               Extra Purple Top Tube[[id number]]                            Final result                 Please view results for these tests on the individual orders.   Extra Blue Top Tube     Status: None   Result Value Ref Range    Hold Specimen JIC    Extra Red Top Tube     Status: None   Result Value Ref Range    Hold Specimen JIC    Extra Green Top (Lithium Heparin) Tube     Status: None   Result Value Ref Range    Hold Specimen JIC    Extra Purple Top Tube     Status: None   Result Value Ref Range    Hold Specimen JIC      Medications   oxyCODONE-acetaminophen (PERCOCET) 5-325 MG per tablet 1 tablet (1 tablet Oral Given 5/3/22 0918)   oxyCODONE-acetaminophen (PERCOCET) 5-325 MG per tablet 1 tablet (1 tablet Oral Given 5/3/22 1333)        Assessments & Plan (with Medical Decision Making)   Patient presents with left labial swelling, suspected fluid collection, possible abscess.  She has no other symptoms, no fever, vomiting, abdominal pain, vaginal discomfort or suspicion for sexually transmitted infection.  Gynecology is unable to come to the [facility] emergency department to see the patient today due to obligations in the operating room.  Patient was given the  clinic phone number for gynecology so she can schedule an appointment.  Gynecology also assured me that they would call the patient today to set up an appointment for her for later this week.  Patient states that since she is having so much pain, she would like to be seen today and is considering going to the [place] emergency department to ask for gynecology consult there.  I do not see induration of the skin, unclear if this is an abscess versus other fluid collection.     I have reviewed the nursing notes. I have reviewed the findings, diagnosis, plan and need for follow up with the patient.    Discharge Medication List as of 5/3/2022  1:42 PM      START taking these medications    Details   oxyCODONE-acetaminophen (PERCOCET) 5-325 MG tablet Take 1 tablet by mouth every 6 hours as needed for pain, Disp-12 tablet, R-0, Local Print             Final diagnoses:   Labial swelling     I, Lakisha Lang, am serving as a trained medical scribe to document services personally performed by Aleyda Hernandez MD based on the provider's statements to me on May 3, 2022.  This document has been checked and approved by the attending provider.    I, Aleyda Hernandez MD, was physically present and have reviewed and verified the accuracy of this note documented by Lakisha Lang, medical scribe.      Aleyda Hernandez MD     [facility] EMERGENCY DEPARTMENT  5/3/2022     Aleyda Hernandez MD  05/03/22 1911

## 2022-05-03 NOTE — ED TRIAGE NOTES
"Pt c/o of a what she describes as a \"hard cyst/boil\" on her left labial fold. She states she noticed it Sunday and used a hot pack yesterday on it. Today the pain is unbearable. 10/10 shap and pressure that is constant.     She states she was tested for STD/STI's last week and everything was good.     She has a hx of boils which she has used topical clindamycin for in the past.     Pt in tears and states its hard to hit/walk because its so painful.    VSS.                Triage Assessment     Row Name 05/03/22 0832       Triage Assessment (Adult)    Airway WDL WDL       Respiratory WDL    Respiratory WDL WDL       Skin Circulation/Temperature WDL    Skin Circulation/Temperature WDL WDL       Cardiac WDL    Cardiac WDL WDL       Peripheral/Neurovascular WDL    Peripheral Neurovascular WDL WDL       Cognitive/Neuro/Behavioral WDL    Cognitive/Neuro/Behavioral WDL WDL              "

## 2022-05-03 NOTE — DISCHARGE INSTRUCTIONS
Please make an appointment to follow up with OB/Gyn - [facility] (phone: [phone]) as soon as possible for further evaluation and management of the swelling.     Please return the emergency department if you have fever, worsening pain, difficulty urinating, vomiting, any other concerns.    You can take Percocet as needed for pain.  May also take ibuprofen as needed.

## 2022-05-03 NOTE — ED PROVIDER NOTES
"    [facility] EMERGENCY DEPARTMENT ([facility])       5/03/22  History     Chief Complaint   Patient presents with     Cyst     Patient presents due to cyst on labia. Patient was at the [facility] ED today when they recommended patient could come here and be seen by OB GYN or she could make an appointment to follow up at a later date. Patient has been in an extreme pain and would like to see someone today. Patient reports stabbing 5/10 pain currently after taking percocet before arrival.     The history is provided by the patient and medical records.     Catalina Foster is a 31 year old female who presents to the Emergency Department via transfer from the [facility] ED for evaluation of a left labial boil and OB/GYN evaluation. She reports symptoms started on Sunday, 2 days ago. She has tried using hot packs with no relief. She reports worsening of the pain last night, states it is a \"10 out 10\". She reports she does have a history of boils, however, not located in this area before. She denies any abdominal pain, nausea, or vomiting.  She denies chance of pregnancy or prior pregnancies. She states the area is very painful to the touch.      Past Medical History  History reviewed. No pertinent past medical history.  Past Surgical History:   Procedure Laterality Date     NASAL SEPTUM SURGERY      06/2021     oxyCODONE-acetaminophen (PERCOCET) 5-325 MG tablet  levonorgestrel (MIRENA) 20 MCG/24HR IUD  Probiotic Product (UP4 PROBIOTICS WOMENS) CAPS      Allergies   Allergen Reactions     Clindamycin Hives and Rash     Orally, topical OK  Orally, topical OK       Family History  Family History   Problem Relation Age of Onset     Ovarian cysts Mother      Heart Disease Father      Hypertension Father      Dementia Maternal Grandmother      Alzheimer Disease Maternal Grandmother      Pneumonia Paternal Grandmother      Social History   Social History     Tobacco Use     Smoking status: Never Smoker     Smokeless " tobacco: Never Used   Substance Use Topics     Alcohol use: Not Currently     Drug use: Yes     Types: Marijuana     Comment: occasional      Past medical history, past surgical history, medications, allergies, family history, and social history were reviewed with the patient. No additional pertinent items.     I have reviewed the Medications, Allergies, Past Medical and Surgical History, and Social History in the Epic system.    Review of Systems   Constitutional: Negative for fever.   Respiratory: Negative for shortness of breath.    Cardiovascular: Negative for chest pain.   Gastrointestinal: Negative for abdominal pain.   Genitourinary: Positive for vaginal pain. Negative for dysuria, hematuria and vaginal discharge.   All other systems reviewed and are negative.    Physical Exam   BP: 105/70  Pulse: 52  Temp: 98.3  F (36.8  C)  Resp: 14  Weight: 89.8 kg (198 lb)  SpO2: 99 %      Physical Exam  Vitals and nursing note reviewed.   Constitutional:       Appearance: She is well-developed.   HENT:      Head: Normocephalic and atraumatic.   Cardiovascular:      Rate and Rhythm: Normal rate and regular rhythm.      Heart sounds: Normal heart sounds.   Pulmonary:      Effort: Pulmonary effort is normal. No respiratory distress.      Breath sounds: Normal breath sounds.   Abdominal:      General: There is no distension.      Palpations: Abdomen is soft.      Tenderness: There is no abdominal tenderness. There is no rebound.   Genitourinary:      Musculoskeletal:         General: No tenderness.      Cervical back: Normal range of motion.   Skin:     General: Skin is warm and dry.   Neurological:      Mental Status: She is alert and oriented to person, place, and time.   Psychiatric:         Behavior: Behavior normal.         Thought Content: Thought content normal.       ED Course     At 4:12 PM the patient was seen and examined by Silva Antunez MD in Room ED07.     Procedures         The medical record was  reviewed and interpreted.    Results for orders placed or performed during the hospital encounter of 05/03/22 (from the past 24 hour(s))   [facility] Draw    Narrative    The following orders were created for panel order [facility] Draw.  Procedure                               Abnormality         Status                     ---------                               -----------         ------                     Extra Blue Top Tube[[id number]]                              Final result               Extra Red Top Tube[[id number]]                               Final result               Extra Green Top (Lithium...[[id number]]                      Final result               Extra Purple Top Tube[[id number]]                            Final result                 Please view results for these tests on the individual orders.   Extra Blue Top Tube   Result Value Ref Range    Hold Specimen JIC    Extra Red Top Tube   Result Value Ref Range    Hold Specimen JIC    Extra Green Top (Lithium Heparin) Tube   Result Value Ref Range    Hold Specimen JIC    Extra Purple Top Tube   Result Value Ref Range    Hold Specimen JIC    POC US SOFT TISSUE    Impression    Limited Soft Tissue Ultrasound, performed and interpreted by me.    Indication:  Pain, swelling. Evaluate for cellulitis vs abscess.     Body location: perineum, left labial skin    Findings:  There is no cobblestoning suggestive of cellulitis in the evaluated area. There is a fluid collection measuring 1 cm to suggest possible abscess vs other fluid collection. No foreign body identified    IMPRESSION: fluid collection, unclear if abscess           US Lower Extremity Non Vascular Left    Narrative    Exam: US LOWER EXTREMITY NON VASCULAR LEFT, 5/3/2022 11:14 AM    Indication: please check for fluid collection on left labia    Comparison: None    Findings:   Focused grayscale and color Doppler evaluation of the left labia  demonstrates peripheral anechoic lesion likely representing  fluid  collection measuring 1.5 x 0.6 x 0.6 cm with adjacent perilesional  increased vascularity on color Doppler      Impression    Impression: Heterogenous predominantly anechoic lesion in the  superficial left labia likely representing fluid  collection/inflammatory lesion measuring up to 1.5 cm.    I have personally reviewed the examination and initial interpretation  and I agree with the findings.    MARILIN SHAY MD         SYSTEM ID:  [id number]   HCG qualitative urine   Result Value Ref Range    hCG Urine Qualitative Negative Negative     Medications - No data to display       Assessments & Plan (with Medical Decision Making)   Patient with a labial abscess on the left labia minora.  Patient was seen initially at [facility] and sent over for evaluation.  Patient was seen by the gynecology resident and they are likely to do a bedside I&D.  Patient will be signed out to the evening doctor for completion of care.    I have reviewed the nursing notes.    I have reviewed the findings, diagnosis, plan and need for follow up with the patient.    New Prescriptions    No medications on file       Final diagnoses:   Labial abscess       I, Renny Black am serving as a trained medical scribe to document services personally performed by Silva Antunez MD, based on the provider's statements to me.      I, Silva Antunez MD, was physically present and have reviewed and verified the accuracy of this note documented by Renny Black.     Silva Antunez MD  5/3/2022   [facility] EMERGENCY DEPARTMENT     Silva Antunez MD  05/03/22 9762

## 2022-05-05 ENCOUNTER — TELEPHONE (OUTPATIENT)
Dept: EMERGENCY MEDICINE | Facility: CLINIC | Age: 32
End: 2022-05-05
Payer: COMMERCIAL

## 2022-05-05 NOTE — TELEPHONE ENCOUNTER
"[facility] () Emergency Department Lab result notification [Adult-Female]    [facility] ED lab result protocol used  Culture protocol    Reason for call  Notify of lab results, assess symptoms,  review ED providers recommendations/discharge instructions (if necessary) and advise per ED lab result f/u protocol    Lab Result (including Rx patient on, if applicable)  [facility] Emergency Dept discharge antibiotic prescribed: None  Incision and Drainage performed in [facility] Emergency Dept [Yes or No]: Yes  Recommendations in treatment per [facility] ED Lab Result culture protocol    Information table from Emergency Dept Provider visit on 5/3/22  Symptoms reported at ED visit (Chief complaint, HPI) Chief Complaint   Patient presents with     Cyst       Patient presents due to cyst on labia. Patient was at the [facility] ED today when they recommended patient could come here and be seen by OB GYN or she could make an appointment to follow up at a later date. Patient has been in an extreme pain and would like to see someone today. Patient reports stabbing 5/10 pain currently after taking percocet before arrival.      The history is provided by the patient and medical records.      Catalina Foster is a 31 year old female who presents to the Emergency Department via transfer from the [facility] ED for evaluation of a left labial boil and OB/GYN evaluation. She reports symptoms started on Sunday, 2 days ago. She has tried using hot packs with no relief. She reports worsening of the pain last night, states it is a \"10 out 10\". She reports she does have a history of boils, however, not located in this area before. She denies any abdominal pain, nausea, or vomiting.  She denies chance of pregnancy or prior pregnancies. She states the area is very painful to the touch.       Significant Medical hx, if applicable (i.e. CKD, diabetes) Reviewed   Allergies Allergies   Allergen " "Reactions     Clindamycin Hives and Rash     Orally, topical OK  Orally, topical OK        Weight, if applicable Wt Readings from Last 2 Encounters:   05/03/22 89.8 kg (198 lb)   05/03/22 89.8 kg (198 lb)      Coumadin/Warfarin [Yes /No] NO   Creatinine Level (mg/dl) Creatinine   Date Value Ref Range Status   04/20/2022 0.62 0.52 - 1.04 mg/dL Final      Creatinine clearance (ml/min), if applicable Serum creatinine: 0.62 mg/dL 04/20/22 1045  Estimated creatinine clearance: 151.1 mL/min   Pregnant (Yes/No/NA) Negative HCG 5/3/22   Breastfeeding (Yes/No/NA) Unknown   ED providers Impression and Plan (applicable information) Patient with a labial abscess on the left labia minora.  Patient was seen initially at [facility] and sent over for evaluation.  Patient was seen by the gynecology resident and they are likely to do a bedside I&D.  Patient will be signed out to the evening doctor for completion of care.   ED diagnosis  Labial abscess   ED provider  Silva Antunez MD      RN Assessment (Patient s current Symptoms), include time called.    4:50P - 'more sore than I was yesterday but still overall good, tender and a little sore but not sharp or burning'  Patient denies worsening symptoms of infection - redness, swelling, warmth, drainage, fever  \"It feels full again\"    Genitourinary symptoms:  No  RN Recommendations/Instructions per [facility] ED lab result protocol  Patient notified of lab result and treatment recommendations.  Rx for Amoxicillin-Clavulanate (Augmentin) 875-125 mg PO tablet, 1 tablet by mouth 2 times daily for 14 days sent to [Pharmacy - [facility] [place]  RN reviewed information about signs of infection, provider recommendations, probiotic.    [facility] Emergency Department Provider Name & Recommendations  S-(situation): Incision and drainage to labial abscess with preliminary results showing bacteria growth - no discharge antibiotics at visit - looking for provider " recommendation  B-(background): see above  A-(assessment): see above  R-(recommendations): Augmentin 875 q12 x 14 days  - go to ER if she devlops fevers, copious drainage, or intolerable pain    Consultation Time:  5/5/2022  Consulted with [facility] Emergency Department Provider, doug Casas NP .     Please Contact your PCP clinic or return to the Emergency department if your:    Symptoms return.    Symptoms do not improve after 3 days on antibiotic.    Symptoms do not resolve after completing antibiotic.    Symptoms worsen or other concerning symptom's.    PCP follow-up Questions asked: YES       Andrez Pate RN  [facility] [employer] [place]  Emergency Dept Lab Result RN  Ph# [phone]     Copy of Lab result   Contains abnormal data Anaerobic Bacterial Culture Routine  Order: [id number]   Status: Preliminary result     Visible to patient: No (not released)    Specimen Information: Vulva; Abscess         1 Result Note    Culture Culture in progress       4+ Peptostreptococcus anaerobius Abnormal     Susceptibilities not routinely done   4+ Prevotella disiens Abnormal     Identification obtained by MALDI-TOF mass spectrometry research use only database. Test characteristics determined and verified by the Infectious Diseases Diagnostic Laboratory.   Susceptibilities not routinely done        Resulting Agency: [facility]           Specimen Collected: 05/03/22  6:26 PM Last Resulted: 05/05/22  2:23 PM

## 2022-05-05 NOTE — RESULT ENCOUNTER NOTE
[facility] Emergency Dept discharge antibiotic prescribed: None  Incision and Drainage performed in [facility] Emergency Dept [Yes or No]: Yes  Recommendations in treatment per [facility] ED Lab Result culture protocol

## 2022-05-06 LAB — BACTERIA ABSC ANAEROBE+AEROBE CULT: ABNORMAL

## 2022-05-07 LAB
BACTERIA ABSC ANAEROBE+AEROBE CULT: ABNORMAL

## 2022-06-01 ENCOUNTER — OFFICE VISIT (OUTPATIENT)
Dept: OBGYN | Facility: CLINIC | Age: 32
End: 2022-06-01
Payer: COMMERCIAL

## 2022-06-01 VITALS
HEIGHT: 66 IN | OXYGEN SATURATION: 97 % | SYSTOLIC BLOOD PRESSURE: 118 MMHG | WEIGHT: 198 LBS | DIASTOLIC BLOOD PRESSURE: 76 MMHG | HEART RATE: 86 BPM | BODY MASS INDEX: 31.82 KG/M2

## 2022-06-01 DIAGNOSIS — Z30.017 INSERTION OF IMPLANTABLE SUBDERMAL CONTRACEPTIVE: Primary | ICD-10-CM

## 2022-06-01 DIAGNOSIS — Z30.432 ENCOUNTER FOR REMOVAL OF INTRAUTERINE CONTRACEPTIVE DEVICE: ICD-10-CM

## 2022-06-01 PROCEDURE — 11981 INSERTION DRUG DLVR IMPLANT: CPT | Performed by: OBSTETRICS & GYNECOLOGY

## 2022-06-01 PROCEDURE — 58301 REMOVE INTRAUTERINE DEVICE: CPT | Performed by: OBSTETRICS & GYNECOLOGY

## 2022-06-01 NOTE — PROGRESS NOTES
GYN CLINIC VISIT  2022  CC: IUD removal, nexplanon insertion      SUBJECTIVE:  Catalina Foster is a 31 year old  with a Mirena IUD. Has been in place for 5yrs  Recent labial abscess  Feels like it is very difficult to lose weight  Started birth control bc periods were bad  Does not get periods with it    Is a pregnancy test required: No.  Was a consent obtained?  Yes    Subjective: Catalina Foster is a 31 year old No obstetric history on file. presents for IUD removal and nexplanon insertion  She requests removal of the IUD because the IUD effectiveness has     Patient has been given the opportunity to ask questions about all forms of birth control, including all options appropriate for Catalina Foster. Discussed that no method of birth control, except abstinence is 100% effective against pregnancy or sexually transmitted infection.     Catalina Foster understands she may have the IUD removed at any time. IUD should be removed by a health care provider and the current IUD will be removed today.    The entire removal and insertion procedure was reviewed with the patient, including care after placement.    Today's PHQ-2 Score:   PHQ-2 (  [employer]) 2022   Q1: Little interest or pleasure in doing things 0   Q2: Feeling down, depressed or hopeless 0   PHQ-2 Score 0   PHQ-2 Total Score (12-17 Years)- Positive if 3 or more points; Administer PHQ-A if positive -   Q1: Little interest or pleasure in doing things Not at all   Q2: Feeling down, depressed or hopeless Not at all   PHQ-2 Score 0       PROCEDURE: IUD REMOVAL  A speculum exam was performed and the cervix was visualized. The IUD string was visualized. Using ring forceps, the string  was grasped and the IUD removed intact.      PROCEDURE: Nexplanon Insertion  Before insertion it was confirmed that Catalina Foster is not pregnant nor has any other contraindication for the use of Nexplanon (no known or suspected pregnancy, no current or past  history of thrombosis or thromboembolic disorders, no liver tumors, no undiagnosed abnormal genital bleeding, no known or suspected breast cancer or progestin-sensitive cancer, and no allergic reaction to any components of nexplanon.)        She understands the benefits and risks of Nexplanon.  She is explained the most common adverse reactions reported in clinical trials were change in menstrual bleeding pattern, headache, vaginitis, weight increase, acne, breast pain, abdominal pain, and pharyngitis. The patient has received a copy of the Patient Labeling included in packaging. Consent and been reviewed and completed. Patient has no allergies to the antiseptic and anesthetic to be used during insertion.      A single NEXPLANON implant was inserted subdermally in the upper right side arm.     The patient lied on her back on the exam table with her non-dominant arm flexed at the elbow and externally rotated with wrist parallel to her ear. The insertion site was identified by measuring at the inner side of the non-dominant upper arm about 8-10cm above the medial epicondyle of the humerus. Large visible blood vessels were noted and avoided. The insertion site was cleansed with betadine.  The insertion area was anesthetized with 3 mL of  1% lidocaine.  The skin was stretched at insertion site and the nexplanon applicator inserted at 30 degrees.  The position of the implant was confirmed immediately after insertion by palpation.  Bandage placed over implant site.  Catalina Foster was able to palpate the implant herself.  Pressure bandage placed with a sterile guaze to minimize bruising.  The patient was instructed to remove the pressure dressing in 24 hours and place a normal bandage over the insertion site for 3-5 days post-insertion.  The patient tolerated the procedure well.        1. Insertion of implantable subdermal contraceptive    - etonogestrel (NEXPLANON) 68 MG IMPL; 1 each (68 mg) by Subdermal route once  -  etonogestrel (NEXPLANON) subdermal implant 68 mg  - INSERTION NON-BIODEGRADABLE DRUG DELIVERY IMPLANT    2. Encounter for removal of intrauterine contraceptive device  - REMOVE INTRAUTERINE DEVICE    Ladonna Emmanuel MD

## 2022-06-01 NOTE — PATIENT INSTRUCTIONS

## 2022-07-05 ENCOUNTER — OFFICE VISIT (OUTPATIENT)
Dept: OTOLARYNGOLOGY | Facility: CLINIC | Age: 32
End: 2022-07-05
Attending: FAMILY MEDICINE
Payer: COMMERCIAL

## 2022-07-05 DIAGNOSIS — J35.01 CHRONIC TONSILLITIS: Primary | ICD-10-CM

## 2022-07-05 DIAGNOSIS — J35.8 CALCULUS OF TONSIL: ICD-10-CM

## 2022-07-05 PROCEDURE — 99243 OFF/OP CNSLTJ NEW/EST LOW 30: CPT | Performed by: OTOLARYNGOLOGY

## 2022-07-05 NOTE — PROGRESS NOTES
HPI: This patient is a 30yo F who presents for evaluation of the tonsils at the request of Dr. Pham. There have been multiple sore throats throughout the last several years leading to missed work/school. Also deals with recurrent tonsil stones despite gargling. Otherwise healthy and without fever, weight loss, odynophagia, dysphagia, hemoptysis, shortness of breath, or other major symptoms.    Past medical history, surgical history, social history, family history, medications, and allergies have been reviewed with the patient and are documented above.    Review of Systems: a 10-system review was performed. Pertinent positives are noted in the HPI and on a separate scanned document in the chart.    PHYSICAL EXAMINATION:  GEN: no acute distress, normocephalic  EYES: extraocular movements are intact, pupils are equal and round. Sclera clear.   EARS: auricles are normally formed. The external auditory canals are clear with minimal to no cerumen. Tympanic membranes are intact bilaterally with no signs of infection, effusion, retractions, or perforations.  NOSE: anterior nares are patent. There are no masses or lesions. The septum is non-obstructing.  OC/OP: clear, dentition is in good repair. The tongue and palate are fully mobile and symmetric. Tonsils are 2+  NECK: soft and supple. No lymphadenopathy or masses. Airway is midline.  NEURO: CN VII and XII symmetric. alert and oriented. No spontaneous nystagmus. Gait is normal.  PULM: breathing comfortably on room air, normal chest expansion with respiration  CARDS: no cyanosis or clubbing, normal carotid pulses    MEDICAL DECISION-MAKING: Catalina is a 30yo F with chronic tonsillitis who would benefit from a tonsillectomy. The risks and benefits were discussed and the patient will schedule at their convenience.

## 2022-07-05 NOTE — LETTER
7/5/2022         RE: Catalina Foster  [address]  [facility] [address]        Dear Colleague,    Thank you for referring your patient, Catalina Foster, to the [facility]. Please see a copy of my visit note below.    HPI: This patient is a 30yo F who presents for evaluation of the tonsils at the request of Dr. Pham. There have been multiple sore throats throughout the last several years leading to missed work/school. Also deals with recurrent tonsil stones despite gargling. Otherwise healthy and without fever, weight loss, odynophagia, dysphagia, hemoptysis, shortness of breath, or other major symptoms.    Past medical history, surgical history, social history, family history, medications, and allergies have been reviewed with the patient and are documented above.    Review of Systems: a 10-system review was performed. Pertinent positives are noted in the HPI and on a separate scanned document in the chart.    PHYSICAL EXAMINATION:  GEN: no acute distress, normocephalic  EYES: extraocular movements are intact, pupils are equal and round. Sclera clear.   EARS: auricles are normally formed. The external auditory canals are clear with minimal to no cerumen. Tympanic membranes are intact bilaterally with no signs of infection, effusion, retractions, or perforations.  NOSE: anterior nares are patent. There are no masses or lesions. The septum is non-obstructing.  OC/OP: clear, dentition is in good repair. The tongue and palate are fully mobile and symmetric. Tonsils are 2+  NECK: soft and supple. No lymphadenopathy or masses. Airway is midline.  NEURO: CN VII and XII symmetric. alert and oriented. No spontaneous nystagmus. Gait is normal.  PULM: breathing comfortably on room air, normal chest expansion with respiration  CARDS: no cyanosis or clubbing, normal carotid pulses    MEDICAL DECISION-MAKING: Catalina is a 30yo F with chronic tonsillitis who would benefit from a  tonsillectomy. The risks and benefits were discussed and the patient will schedule at their convenience.        Again, thank you for allowing me to participate in the care of your patient.        Sincerely,        Deepali Enriquez MD

## 2022-07-08 ENCOUNTER — TELEPHONE (OUTPATIENT)
Dept: OTOLARYNGOLOGY | Facility: CLINIC | Age: 32
End: 2022-07-08

## 2022-07-11 NOTE — TELEPHONE ENCOUNTER
Spoke with Catalina today regarding surgery scheduling     Went over details/instructions and she will read letter with info that she was in agreement with.    Surgery Letter sent via [employer]      We've received instruction to get you scheduled for Outpatient Surgery with Dr Enriquez. We have that arranged as follows:     Pre-op Physical:  Dr Ma  Arriving on 10/3/22 at 2 p.m at                                 [facility]                                 [address], [address]                                  [facility] [address]    Surgery Date: 10/4/2022     Location: [facility]                    [address] [place] [address]    Approximate Arrival Time: 6 a.m.  (Unless instructed differently by the pre-op call nurse)     Post op Appointment:  Dr. Enriquez on 11/3/2022 at 2:45 p.m.                                          [facility]                                          [address] [place] [address]    Prep Tasks and Info:     1. A pre-op physical with your primary care doctor within 30 days of surgery. This is required by anesthesia and if not done, your surgery will be cancelled. Call them asap to get this scheduled.    2. Review your medications with your primary care or prescribing physician; they will advise you which meds to stop and when, and when you can resume taking.  Certain medications like blood thinners need to be stopped in advance of surgery to proceed safely.    3. You must get tested for COVID-19, even if you are vaccinated.    Outpatient Surgery:  If you are going home the same day of surgery, a home rapid antigen Covid-19 test is required 1-2 days before surgery- regardless of your vaccination status.  Take a photo of the negative result and show to the nurse on the day of surgery. If you test positive, contact our office right away to reschedule surgery. You can buy a home Covid-19 Rapid Antigen  test at many local pharmacies, or you can order for free at covid.gov/tests.    Admits after Surgery:  If you are staying overnight or longer following surgery, a PCR test is required 4 days before surgery instead of the rapid antigen test.   Please schedule a PCR test with [facility] by calling [phone] or visit [email or web address].  You are permitted to have this done outside of our system but must fax the result to [phone].     4. Please shower the evening before and morning of surgery with Hibiclens or Exidine soap.  This can be found at your local pharmacy.    5. Fasting instructions will be provided by the pre-op nurse who will call you 1-3 days before surgery.  Typically we advise normal food up to 8 hours before surgery then clear liquids only up to 2 hours before surgery then nothing at all by mouth for 2 hours including no gum or candy.  The nurse will review your specific instructions with you at the call.      6. Smoking impacts your body's ability to heal properly.  If you are a smoker, we strongly urge you to stop smoking 4-6 weeks before surgery. Plastic surgery patients are required to be nicotine free for 6-8 weeks before surgery.     7. You will need an adult to drive you home and stay with you 24 hours after surgery. Public transportation or Medical Van Services are not permitted.    8. You may have one family member wait in the lobby at the surgery center during your surgery. Visitor restrictions are subject to change, please verify with the pre-op nurse when they call.    9. If the community sees a new COVID19 surge, your procedure may need to be postponed. We will contact you if this happens. You will be screened for high-risk exposure to Covid-19 during the pre-op call.  We encourage you to quarantine yourself away from any Covid-19 people for 10 days before surgery to avoid possible last minute cancellations.   When you arrive to the surgery center, you  will again be screened for COVID19 symptoms. If you screen positive, your surgery will need to be postponed.    10. We always encourage you to notify your insurance any time you have medical tests or procedures scheduled including surgery. The number is usually right on the back of your insurance card. Please call [facility] Cost of Care at [phone] if you'd like a surgery quote.       Call our office if you have any questions! Thank you!

## 2022-07-12 NOTE — PROGRESS NOTES
[facility] Dermatology Note  Encounter Date: Jul 13, 2022  Office Visit     Dermatology Problem List:  1. Acne vulgaris   - Current treatment: isotretinoin pending insurance  - Prior tx: clindamycin 1% lotion, Retin-A 0.025%, spironolactone, doxycycline   ____________________________________________    Assessment & Plan:    # Acne vulgaris - chronic, active.  Previously failed topicals, spironolactone, doxycycline as above. Plan to start isotretinoin as below.  - Labs: Urine pregnancy test today.    - Had baseline labs from 4/2022 which we reviewed today, will not repeat any next month but can perform after 1 month of therapy.  - Next month, plan to start isotretinoin  - In meantime, continue current acne regimen  - Cumulative dose: 0 mg   - Goal cumulative dose of 13,050 mg - 19,140 mg (150-220 mg/kg in this 89 kg patient)   - IPledge Category: Female of Reproductive Potential (FRP)   - Primary Form of Contraception (if applicable): Nexplanon   - Secondary Form of Contraception (if applicable): Condoms   - Discussion of the risks and side effects of Accutane including but not limited to mucocutaneous dryness, arthralgias, myalgias, depression, suicidal ideation, headache, blurred vision,  increase in liver function tests, increase in lipids, and teratogenic effects.  The ipledgeprogram brochure was provided and the contents discussed with the patient. The patient was counseled they cannot give blood while on Accutane. No personal or family history of inflammatory bowel disease or hypertriglyceridemia known to patient. Reviewed need to avoid alcohol on medication. I-pledge program consent was obtained. Patient counseled that if they wear contacts eye may become too dry to tolerate. Recommend follow up with eye doctor.      Procedures Performed:   None    Follow-up: 1 month(s) in-person, or earlier for new or changing lesions    Staff and Scribe:     Scribe Disclosure:  Ladonna RAO, am serving  as a scribe to document services personally performed by Amalia Brannon MD based on data collection and the provider's statements to me.     Provider Disclosure:   The documentation recorded by the scribe accurately reflects the services I personally performed and the decisions made by me.    Amalia Brannon MD    Department of Dermatology  [facility]: Phone: [phone], Fax: [phone]  7/19/2022     ____________________________________________    CC: Derm Problem (Acne - discuss starting Accutane. Had insurance issues previously)    HPI:  Ms. Catalina Foster is a(n) 31 year old female who presents today as a return patient for follow up on acne. The patient was last seen in dermatology on 7/15/21 by Dr. Jiménez at which time she was started on isotretinoin 40mg daily for treatment of acne.     Today, the patient reports she has not yet started Accutane due to insurance issues. She is continuing to experience flares despite trying several topical therapies and spironolactone.    Patient is otherwise feeling well, without additional skin concerns.    Labs Reviewed:  CBC, CMP, and lipid panel from 4/20/22    Physical Exam:  Vitals: There were no vitals taken for this visit.  SKIN: Focused examination of the face was performed.  - There are superifical acneiform papules with intermixed open and closed comedones on the chin, face and base of the scalp. There are admixed pink to hyperpigmented macules c/w scarring.   - No other lesions of concern on areas examined.     Medications:  Current Outpatient Medications   Medication     etonogestrel (NEXPLANON) 68 MG IMPL     Probiotic Product (UP4 PROBIOTICS WOMENS) CAPS     levonorgestrel (MIRENA) 20 MCG/24HR IUD     No current facility-administered medications for this visit.      Past Medical History:   There is no problem list on file for this patient.    No past medical  history on file.     CC Estefany Pham MD  [address] [facility] [address]  [place] [address] on close of this encounter.

## 2022-07-13 ENCOUNTER — OFFICE VISIT (OUTPATIENT)
Dept: DERMATOLOGY | Facility: CLINIC | Age: 32
End: 2022-07-13
Attending: FAMILY MEDICINE
Payer: COMMERCIAL

## 2022-07-13 ENCOUNTER — LAB (OUTPATIENT)
Dept: LAB | Facility: CLINIC | Age: 32
End: 2022-07-13

## 2022-07-13 DIAGNOSIS — L70.0 CYSTIC ACNE: ICD-10-CM

## 2022-07-13 LAB — HCG UR QL: NEGATIVE

## 2022-07-13 PROCEDURE — 99214 OFFICE O/P EST MOD 30 MIN: CPT | Performed by: DERMATOLOGY

## 2022-07-13 PROCEDURE — 81025 URINE PREGNANCY TEST: CPT | Performed by: PATHOLOGY

## 2022-07-13 NOTE — NURSING NOTE
Dermatology Rooming Note    Catalina Foster's goals for this visit include:   Chief Complaint   Patient presents with     Derm Problem     Acne - discuss starting Accutane. Had insurance issues previously     Irma Peng, CMA

## 2022-07-13 NOTE — LETTER
7/13/2022       RE: Catalina Foster  [address]  [facility] [address]     Dear Colleague,    Thank you for referring your patient, Catalina Foster, to the [facility] DERMATOLOGY [facility]IC [place] at [facility]. Please see a copy of my visit note below.    [facility] Dermatology Note  Encounter Date: Jul 13, 2022  Office Visit     Dermatology Problem List:  1. Acne vulgaris   - Current treatment: isotretinoin pending insurance  - Prior tx: clindamycin 1% lotion, Retin-A 0.025%, spironolactone, doxycycline   ____________________________________________    Assessment & Plan:    # Acne vulgaris - chronic, active.  Previously failed topicals, spironolactone, doxycycline as above. Plan to start isotretinoin as below.  - Labs: Urine pregnancy test today.    - Had baseline labs from 4/2022 which we reviewed today, will not repeat any next month but can perform after 1 month of therapy.  - Next month, plan to start isotretinoin  - In meantime, continue current acne regimen  - Cumulative dose: 0 mg   - Goal cumulative dose of 13,050 mg - 19,140 mg (150-220 mg/kg in this 89 kg patient)   - IPledge Category: Female of Reproductive Potential (FRP)   - Primary Form of Contraception (if applicable): Nexplanon   - Secondary Form of Contraception (if applicable): Condoms   - Discussion of the risks and side effects of Accutane including but not limited to mucocutaneous dryness, arthralgias, myalgias, depression, suicidal ideation, headache, blurred vision,  increase in liver function tests, increase in lipids, and teratogenic effects.  The ipledgeprogram brochure was provided and the contents discussed with the patient. The patient was counseled they cannot give blood while on Accutane. No personal or family history of inflammatory bowel disease or hypertriglyceridemia known to patient. Reviewed need to avoid alcohol on medication. I-pledge program  consent was obtained. Patient counseled that if they wear contacts eye may become too dry to tolerate. Recommend follow up with eye doctor.      Procedures Performed:   None    Follow-up: 1 month(s) in-person, or earlier for new or changing lesions    Staff and Scribe:     Scribe Disclosure:  I, Ladonna De La Torre, am serving as a scribe to document services personally performed by Amalia Brannon MD based on data collection and the provider's statements to me.     Provider Disclosure:   The documentation recorded by the scribe accurately reflects the services I personally performed and the decisions made by me.    Amalia Brannon MD    Department of Dermatology  [facility]: Phone: [phone], Fax: [phone]  7/19/2022     ____________________________________________    CC: Derm Problem (Acne - discuss starting Accutane. Had insurance issues previously)    HPI:  Ms. Catalina Foster is a(n) 31 year old female who presents today as a return patient for follow up on acne. The patient was last seen in dermatology on 7/15/21 by Dr. Jiménez at which time she was started on isotretinoin 40mg daily for treatment of acne.     Today, the patient reports she has not yet started Accutane due to insurance issues. She is continuing to experience flares despite trying several topical therapies and spironolactone.    Patient is otherwise feeling well, without additional skin concerns.    Labs Reviewed:  CBC, CMP, and lipid panel from 4/20/22    Physical Exam:  Vitals: There were no vitals taken for this visit.  SKIN: Focused examination of the face was performed.  - There are superifical acneiform papules with intermixed open and closed comedones on the chin, face and base of the scalp. There are admixed pink to hyperpigmented macules c/w scarring.   - No other lesions of concern on areas examined.     Medications:  Current Outpatient  Medications   Medication     etonogestrel (NEXPLANON) 68 MG IMPL     Probiotic Product (UP4 PROBIOTICS WOMENS) CAPS     levonorgestrel (MIRENA) 20 MCG/24HR IUD     No current facility-administered medications for this visit.      Past Medical History:   There is no problem list on file for this patient.    No past medical history on file.     CC Estefany Pham MD  [address]  [place] [address] on close of this encounter.

## 2022-08-03 ENCOUNTER — TELEPHONE (OUTPATIENT)
Dept: OTOLARYNGOLOGY | Facility: CLINIC | Age: 32
End: 2022-08-03

## 2022-08-03 NOTE — TELEPHONE ENCOUNTER
Catalina would like to r/s her surgery out   We rescheduled  And pre-op is good for the time frame still. Also her post op change the following has been put in a new letter and sent out via my Chart.    Catalina will call with new insurance in September when it changes!!!      We've received instruction to get you scheduled for Outpatient Surgery with Dr Enriquez. We have that arranged as follows:      Pre-op Physical:  Dr Ma  Arriving on 10/3/22 at 2 p.m at                                 [address] [place], [address]                                  [facility] [address]     Surgery Date: 11/012022      Location: [facility]                    [address] [place] [address]     Approximate Arrival Time: 6 a.m.  (Unless instructed differently by the pre-op call nurse)      Post op Appointment:  Dr. Enriquez on 11/29/2022 at 3:00 p.m.                                          [facility]                                          [address] [place] [address]

## 2022-08-11 NOTE — PROGRESS NOTES
[facility] Dermatology Note  Encounter Date: Aug 12, 2022  Telephone ([phone]). Location of teledermatologist: [facility]ERMATOLOG[facility]INIC [place]. Start time: 8:55. End time: 9:00.    Dermatology Problem List:  1. Acne vulgaris   - Current treatment: isotretinoin 40 mg daily (started 8/12/2022)  - Prior tx: clindamycin 1% lotion, Retin-A 0.025%, spironolactone, doxycycline   ____________________________________________    Assessment & Plan:     # Acne vulgaris - chronic, active.  Previously failed topicals, spironolactone, doxycycline as above. Plan to start isotretinoin 40 mg daily as below.  - Labs: negative pregnancy test sent over [employer] today  - Had baseline labs 4/2022 wnl, will repeat labs next month.  - Cumulative dose: 0 mg   - Goal cumulative dose of 13,050 mg - 19,140 mg (150-220 mg/kg in this 89 kg patient)   - IPledge Category: Female of Reproductive Potential (FRP)   - Primary Form of Contraception (if applicable): Nexplanon   - Secondary Form of Contraception (if applicable): Condoms   - Stop all other acne products  - Start gentle cleanser, moisturization    Procedures Performed:    None    Follow-up: 1 month(s) in-person, or earlier for new or changing lesions    Staff and Scribe:      Scribe Disclosure:  I, SILVERIO CUNNINGHAM, am serving as a scribe to document services personally performed by Amalia Brannon MD based on data collection and the provider's statements to me.     Provider Disclosure:   The documentation recorded by the scribe accurately reflects the services I personally performed and the decisions made by me.    Amalia Brannon MD    Department of Dermatology  [facility]: Phone: [phone], Fax: [phone]  8/12/2022     ____________________________________________    CC: Accutane    HPI:  Ms. Catalina Foster is a(n) 31 year old female who presents  today as a return patient for Accutane follow up. Last seen by myself on 7/13/22, at which time patient was stopped on all prior acne medications (tretinoin 0.025%, doxycyline, and sprionolactone) and took a urine pregnancy test to prepare for start of Accutane at next visit.     Today, patient reports that she is ready to start Accutane. Entered a negative pregnancy test today. Notes that she has started a new job and will get new insurance starting in September.     Patient is otherwise feeling well, without additional skin concerns.    Labs Reviewed:  N/A    Physical Exam:  Vitals: There were no vitals taken for this visit.  SKIN: Teledermatology photos were reviewed; image quality and interpretability: acceptable. Image date: 8/5/22  - Acneiform papules on the chin, back and chest.  - No other lesions of concern on areas examined.     Medications:  Current Outpatient Medications   Medication     etonogestrel (NEXPLANON) 68 MG IMPL     Probiotic Product (UP4 PROBIOTICS WOMENS) CAPS     levonorgestrel (MIRENA) 20 MCG/24HR IUD     No current facility-administered medications for this visit.      Past Medical/Surgical History:   There is no problem list on file for this patient.    No past medical history on file.    CC [facility]  [address]  [place] [address] on close of this encounter.

## 2022-08-12 ENCOUNTER — VIRTUAL VISIT (OUTPATIENT)
Dept: DERMATOLOGY | Facility: CLINIC | Age: 32
End: 2022-08-12
Payer: COMMERCIAL

## 2022-08-12 DIAGNOSIS — L70.0 CYSTIC ACNE: Primary | ICD-10-CM

## 2022-08-12 PROCEDURE — 99214 OFFICE O/P EST MOD 30 MIN: CPT | Mod: GQ | Performed by: DERMATOLOGY

## 2022-08-12 RX ORDER — ISOTRETINOIN 40 MG/1
40 CAPSULE ORAL DAILY
Qty: 30 CAPSULE | Refills: 0 | Status: SHIPPED | OUTPATIENT
Start: 2022-08-12 | End: 2022-09-13

## 2022-08-12 ASSESSMENT — PAIN SCALES - GENERAL: PAINLEVEL: NO PAIN (0)

## 2022-08-12 NOTE — LETTER
Date:August 12, 2022      Provider requested that no letter be sent. Do not send.       [facility]

## 2022-08-12 NOTE — LETTER
8/12/2022       RE: Catalina Foster  [address]  [place] [address]     Dear Colleague,    Thank you for referring your patient, Catalina Foster, to the [facility] DERMATOLOGY [facility]IN[facility] [place] at [facility]. Please see a copy of my visit note below.    [facility] Dermatology Note  Encounter Date: Aug 12, 2022  Telephone ([phone]). Location of teledermatologist: [facility]ER[facility]OLOG[facility] [place]. Start time: 8:55. End time: 9:00.    Dermatology Problem List:  1. Acne vulgaris   - Current treatment: isotretinoin 40 mg daily (started 8/12/2022)  - Prior tx: clindamycin 1% lotion, Retin-A 0.025%, spironolactone, doxycycline   ____________________________________________    Assessment & Plan:     # Acne vulgaris - chronic, active.  Previously failed topicals, spironolactone, doxycycline as above. Plan to start isotretinoin 40 mg daily as below.  - Labs: negative pregnancy test sent over [employer] today  - Had baseline labs 4/2022 wnl, will repeat labs next month.  - Cumulative dose: 0 mg   - Goal cumulative dose of 13,050 mg - 19,140 mg (150-220 mg/kg in this 89 kg patient)   - IPledge Category: Female of Reproductive Potential (FRP)   - Primary Form of Contraception (if applicable): Nexplanon   - Secondary Form of Contraception (if applicable): Condoms   - Stop all other acne products  - Start gentle cleanser, moisturization    Procedures Performed:    None    Follow-up: 1 month(s) in-person, or earlier for new or changing lesions    Staff and Scribe:      Scribe Disclosure:  I, SILVERIO CUNNINGHAM, am serving as a scribe to document services personally performed by Amalia Brannon MD based on data collection and the provider's statements to me.     Provider Disclosure:   The documentation recorded by the scribe accurately reflects the services I personally performed and the decisions made by me.    Amalia Brannon,  MD    Department of Dermatology  [facility]: Phone: [phone], Fax: [phone]  8/12/2022     ____________________________________________    CC: Accutane    HPI:  Ms. Catalina Foster is a(n) 31 year old female who presents today as a return patient for Accutane follow up. Last seen by myself on 7/13/22, at which time patient was stopped on all prior acne medications (tretinoin 0.025%, doxycyline, and sprionolactone) and took a urine pregnancy test to prepare for start of Accutane at next visit.     Today, patient reports that she is ready to start Accutane. Entered a negative pregnancy test today. Notes that she has started a new job and will get new insurance starting in September.     Patient is otherwise feeling well, without additional skin concerns.    Labs Reviewed:  N/A    Physical Exam:  Vitals: There were no vitals taken for this visit.  SKIN: Teledermatology photos were reviewed; image quality and interpretability: acceptable. Image date: 8/5/22  - Acneiform papules on the chin, back and chest.  - No other lesions of concern on areas examined.     Medications:  Current Outpatient Medications   Medication     etonogestrel (NEXPLANON) 68 MG IMPL     Probiotic Product (UP4 PROBIOTICS WOMENS) CAPS     levonorgestrel (MIRENA) 20 MCG/24HR IUD     No current facility-administered medications for this visit.      Past Medical/Surgical History:   There is no problem list on file for this patient.    No past medical history on file.    CC [facility]  [address] [address] on close of this encounter.      Again, thank you for allowing me to participate in the care of your patient.      Sincerely,    Amalia Brannon MD

## 2022-08-12 NOTE — NURSING NOTE
Dermatology Rooming Note    Catalina Foster's goals for this visit include:   Chief Complaint   Patient presents with     Darling Miller, Visit Facilitator

## 2022-09-13 ENCOUNTER — TELEPHONE (OUTPATIENT)
Dept: DERMATOLOGY | Facility: CLINIC | Age: 32
End: 2022-09-13

## 2022-09-13 ENCOUNTER — OFFICE VISIT (OUTPATIENT)
Dept: DERMATOLOGY | Facility: CLINIC | Age: 32
End: 2022-09-13
Payer: COMMERCIAL

## 2022-09-13 ENCOUNTER — LAB (OUTPATIENT)
Dept: LAB | Facility: CLINIC | Age: 32
End: 2022-09-13
Payer: COMMERCIAL

## 2022-09-13 DIAGNOSIS — Z51.81 THERAPEUTIC DRUG MONITORING: ICD-10-CM

## 2022-09-13 DIAGNOSIS — L70.0 CYSTIC ACNE: Primary | ICD-10-CM

## 2022-09-13 LAB
ALBUMIN SERPL BCG-MCNC: 4.1 G/DL (ref 3.5–5.2)
ALP SERPL-CCNC: 65 U/L (ref 35–104)
ALT SERPL W P-5'-P-CCNC: 49 U/L (ref 10–35)
AST SERPL W P-5'-P-CCNC: 45 U/L (ref 10–35)
BASOPHILS # BLD AUTO: 0 10E3/UL (ref 0–0.2)
BASOPHILS NFR BLD AUTO: 1 %
BILIRUB DIRECT SERPL-MCNC: <0.2 MG/DL (ref 0–0.3)
BILIRUB SERPL-MCNC: <0.2 MG/DL
CHOLEST SERPL-MCNC: 220 MG/DL
EOSINOPHIL # BLD AUTO: 0.1 10E3/UL (ref 0–0.7)
EOSINOPHIL NFR BLD AUTO: 1 %
ERYTHROCYTE [DISTWIDTH] IN BLOOD BY AUTOMATED COUNT: 12.1 % (ref 10–15)
HCG INTACT+B SERPL-ACNC: 6 MIU/ML
HCT VFR BLD AUTO: 36.6 % (ref 35–47)
HDLC SERPL-MCNC: 63 MG/DL
HGB BLD-MCNC: 12.2 G/DL (ref 11.7–15.7)
IMM GRANULOCYTES # BLD: 0 10E3/UL
IMM GRANULOCYTES NFR BLD: 0 %
LDLC SERPL CALC-MCNC: 141 MG/DL
LYMPHOCYTES # BLD AUTO: 1.9 10E3/UL (ref 0.8–5.3)
LYMPHOCYTES NFR BLD AUTO: 44 %
MCH RBC QN AUTO: 29.9 PG (ref 26.5–33)
MCHC RBC AUTO-ENTMCNC: 33.3 G/DL (ref 31.5–36.5)
MCV RBC AUTO: 90 FL (ref 78–100)
MONOCYTES # BLD AUTO: 0.4 10E3/UL (ref 0–1.3)
MONOCYTES NFR BLD AUTO: 9 %
NEUTROPHILS # BLD AUTO: 2 10E3/UL (ref 1.6–8.3)
NEUTROPHILS NFR BLD AUTO: 45 %
NONHDLC SERPL-MCNC: 157 MG/DL
NRBC # BLD AUTO: 0 10E3/UL
NRBC BLD AUTO-RTO: 0 /100
PLATELET # BLD AUTO: 259 10E3/UL (ref 150–450)
PROT SERPL-MCNC: 6.9 G/DL (ref 6.4–8.3)
RBC # BLD AUTO: 4.08 10E6/UL (ref 3.8–5.2)
TRIGL SERPL-MCNC: 78 MG/DL
WBC # BLD AUTO: 4.3 10E3/UL (ref 4–11)

## 2022-09-13 PROCEDURE — 99000 SPECIMEN HANDLING OFFICE-LAB: CPT | Performed by: PATHOLOGY

## 2022-09-13 PROCEDURE — 80061 LIPID PANEL: CPT | Mod: 90 | Performed by: PATHOLOGY

## 2022-09-13 PROCEDURE — 36415 COLL VENOUS BLD VENIPUNCTURE: CPT | Performed by: PATHOLOGY

## 2022-09-13 PROCEDURE — 84702 CHORIONIC GONADOTROPIN TEST: CPT | Performed by: PATHOLOGY

## 2022-09-13 PROCEDURE — 99214 OFFICE O/P EST MOD 30 MIN: CPT | Performed by: DERMATOLOGY

## 2022-09-13 PROCEDURE — 85025 COMPLETE CBC W/AUTO DIFF WBC: CPT | Performed by: PATHOLOGY

## 2022-09-13 PROCEDURE — 80076 HEPATIC FUNCTION PANEL: CPT | Performed by: PATHOLOGY

## 2022-09-13 RX ORDER — ISOTRETINOIN 40 MG/1
40 CAPSULE ORAL 2 TIMES DAILY
Qty: 60 CAPSULE | Refills: 0 | Status: SHIPPED | OUTPATIENT
Start: 2022-09-13 | End: 2022-10-25

## 2022-09-13 ASSESSMENT — PAIN SCALES - GENERAL: PAINLEVEL: NO PAIN (0)

## 2022-09-13 NOTE — PROGRESS NOTES
[facility] Dermatology Note  Encounter Date: Sep 13, 2022  Office Visit     Dermatology Problem List:  1. Acne vulgaris   - Current treatment: isotretinoin 40 mg daily started 8/12/2022, increased to 40 mg BID 9/13/2022  - Prior tx: clindamycin 1% lotion, Retin-A 0.025%, spironolactone, doxycycline     ____________________________________________    Assessment & Plan:    # Acne vulgaris - chronic, active.  Tolerating side effects well. Noted some difficulty regulating emotions for first 1-1.5 weeks after starting isotretinoin but now improved. No depression or suicidal ideation. Working with therapist. Advised okay to try increasing to 1 mg/kg dose but counseled patient that we can reduce dose if any side effects not tolerable on higher dose.  - Increase isotretinoin from 40 mg daily to 40 mg BID as below.  - Labs today: CBC, Hepatic panel, Lipid panel, HCG  - Cumulative dose: 1200 mg   - Goal cumulative dose of 13,050 mg - 19,140 mg (150-220 mg/kg in this 89 kg patient)   - IPledge Category: Female of Reproductive Potential (FRP)   - Primary Form of Contraception (if applicable): Nexplanon   - Secondary Form of Contraception (if applicable): Condoms   - Continue gentle cleanser, moisturization  - Patient is getting tonsillectomy in November, recommended notify surgeon she is taking isotretinoin.     Procedures Performed:   None    Follow-up: 1 month(s) as scheduled with Dr. Hernandes    Staff and Scribe:     Scribe Disclosure:  I, Giovani Barajas, am serving as a scribe to document services personally performed by Amalia Brannon MD based on data collection and the provider's statements to me.     Provider Disclosure:   The documentation recorded by the scribe accurately reflects the services I personally performed and the decisions made by me.    Amalia Brannon MD    Department of Dermatology  [facility]: Phone: [phone], Fax: [phone]  9/13/2022     ____________________________________________    CC: Accutane (Doing well. )    HPI:  Ms. Catalina Foster is a(n) 31 year old female who presents today as a return patient for Accutane follow-up. Last seen by me on 8/12/22, at which time the patient was started on isotretinoin 40 mg daily for the treatment of acne vulgaris.    Today, patient reported improved acne after one month of Accutane. Patient also started new birth control in June and reported a week and a half of feeling on edge and trouble regulating emotions. Patient reported no suicidal thoughts or depression and that the emotions have started to regulate towards the end of the month. Patient denies headaches, stomach symptoms or vision changes. Patient states that the dryness is manageable.    Patient is otherwise feeling well, without additional skin concerns.    Labs Reviewed:  N/A    Physical Exam:  Vitals: There were no vitals taken for this visit.  SKIN: Focused examination of face was performed.  - Few acneiform papules on chin.  - No other lesions of concern on areas examined.     Medications:  Current Outpatient Medications   Medication     etonogestrel (NEXPLANON) 68 MG IMPL     ISOtretinoin (ACCUTANE) 40 MG capsule     Probiotic Product (UP4 PROBIOTICS WOMENS) CAPS     No current facility-administered medications for this visit.      Past Medical History:   There is no problem list on file for this patient.    No past medical history on file.     CC Referred Self, MD  No address on file on close of this encounter.

## 2022-09-13 NOTE — LETTER
9/13/2022       RE: Catalina Foster  [address]  [place] [address]     Dear Colleague,    Thank you for referring your patient, Catalina Foster, to the [facility] DERMATOLOGY CLINIC [place] at [facility]. Please see a copy of my visit note below.    [facility] Dermatology Note  Encounter Date: Sep 13, 2022  Office Visit     Dermatology Problem List:  1. Acne vulgaris   - Current treatment: isotretinoin 40 mg daily started 8/12/2022, increased to 40 mg BID 9/13/2022  - Prior tx: clindamycin 1% lotion, Retin-A 0.025%, spironolactone, doxycycline     ____________________________________________    Assessment & Plan:    # Acne vulgaris - chronic, active.  Tolerating side effects well. Noted some difficulty regulating emotions for first 1-1.5 weeks after starting isotretinoin but now improved. No depression or suicidal ideation. Working with therapist. Advised okay to try increasing to 1 mg/kg dose but counseled patient that we can reduce dose if any side effects not tolerable on higher dose.  - Increase isotretinoin from 40 mg daily to 40 mg BID as below.  - Labs today: CBC, Hepatic panel, Lipid panel, HCG  - Cumulative dose: 1200 mg   - Goal cumulative dose of 13,050 mg - 19,140 mg (150-220 mg/kg in this 89 kg patient)   - IPledge Category: Female of Reproductive Potential (FRP)   - Primary Form of Contraception (if applicable): Nexplanon   - Secondary Form of Contraception (if applicable): Condoms   - Continue gentle cleanser, moisturization  - Patient is getting tonsillectomy in November, recommended notify surgeon she is taking isotretinoin.     Procedures Performed:   None    Follow-up: 1 month(s) as scheduled with Dr. Hernandes    Staff and Scribe:     Scribe Disclosure:  Giovani RAO, am serving as a scribe to document services personally performed by Amalia Brannon MD based on data collection and the provider's  statements to me.     Provider Disclosure:   The documentation recorded by the scribe accurately reflects the services I personally performed and the decisions made by me.    Amalia Brannon MD    Department of Dermatology  [facility]: Phone: [phone], Fax: [phone]  9/13/2022     ____________________________________________    CC: Accutane (Doing well. )    HPI:  Ms. Catalina Foster is a(n) 31 year old female who presents today as a return patient for Accutane follow-up. Last seen by me on 8/12/22, at which time the patient was started on isotretinoin 40 mg daily for the treatment of acne vulgaris.    Today, patient reported improved acne after one month of Accutane. Patient also started new birth control in June and reported a week and a half of feeling on edge and trouble regulating emotions. Patient reported no suicidal thoughts or depression and that the emotions have started to regulate towards the end of the month. Patient denies headaches, stomach symptoms or vision changes. Patient states that the dryness is manageable.    Patient is otherwise feeling well, without additional skin concerns.    Labs Reviewed:  N/A    Physical Exam:  Vitals: There were no vitals taken for this visit.  SKIN: Focused examination of face was performed.  - Few acneiform papules on chin.  - No other lesions of concern on areas examined.     Medications:  Current Outpatient Medications   Medication     etonogestrel (NEXPLANON) 68 MG IMPL     ISOtretinoin (ACCUTANE) 40 MG capsule     Probiotic Product (UP4 PROBIOTICS WOMENS) CAPS     No current facility-administered medications for this visit.      Past Medical History:   There is no problem list on file for this patient.    No past medical history on file.     DANNY Ryder MD  No address on file on close of this encounter.      Again, thank you for allowing me to participate  in the care of your patient.      Sincerely,    Amalia Brannon MD

## 2022-09-13 NOTE — NURSING NOTE
Dermatology Rooming Note    Catalina Foster's goals for this visit include:   Chief Complaint   Patient presents with     Accutane     Doing well.      Irma Peng, CMA

## 2022-09-13 NOTE — TELEPHONE ENCOUNTER
Called and left a voicemail due to HCG being above the normal limit. Dr. Brannon would like Catalina to repeat the HCG on Thursday to recheck the level. Clinic number provided to call back to discuss further and to make a lab appointment.

## 2022-09-13 NOTE — LETTER
Date:September 14, 2022      Patient was self referred, no letter generated. Do not send.        [facility] Health Information

## 2022-09-15 ENCOUNTER — LAB (OUTPATIENT)
Dept: LAB | Facility: CLINIC | Age: 32
End: 2022-09-15
Payer: COMMERCIAL

## 2022-09-15 ENCOUNTER — MYC MEDICAL ADVICE (OUTPATIENT)
Dept: DERMATOLOGY | Facility: CLINIC | Age: 32
End: 2022-09-15

## 2022-09-15 DIAGNOSIS — L70.0 CYSTIC ACNE: Primary | ICD-10-CM

## 2022-09-15 DIAGNOSIS — Z51.81 THERAPEUTIC DRUG MONITORING: ICD-10-CM

## 2022-09-15 DIAGNOSIS — L70.0 CYSTIC ACNE: ICD-10-CM

## 2022-09-15 LAB
ALT SERPL W P-5'-P-CCNC: 43 U/L (ref 10–35)
AST SERPL W P-5'-P-CCNC: 31 U/L (ref 10–35)
HCG INTACT+B SERPL-ACNC: <1 MIU/ML
HCG UR QL: NEGATIVE

## 2022-09-15 PROCEDURE — 84702 CHORIONIC GONADOTROPIN TEST: CPT | Performed by: PATHOLOGY

## 2022-09-15 PROCEDURE — 36415 COLL VENOUS BLD VENIPUNCTURE: CPT | Performed by: PATHOLOGY

## 2022-09-15 PROCEDURE — 84460 ALANINE AMINO (ALT) (SGPT): CPT | Performed by: PATHOLOGY

## 2022-09-15 PROCEDURE — 81025 URINE PREGNANCY TEST: CPT | Performed by: PATHOLOGY

## 2022-09-15 PROCEDURE — 84450 TRANSFERASE (AST) (SGOT): CPT | Performed by: PATHOLOGY

## 2022-09-19 NOTE — TELEPHONE ENCOUNTER
From 9/15/2022 HCG results:   Amalia Brannon MD   9/15/2022  3:32 PM CDT Back to Top        Please confirm in ipledge.     See TE from 9/13/2022 for more information.  See HCG result notes from 9/13/22 and 9/15/22 for more information.    Patient confirmed in Ipledge.

## 2022-09-19 NOTE — TELEPHONE ENCOUNTER
Confirm Patient Counseling is Complete.  The patient's REMS Status is Required to Demonstrate Comprehension.    The patient must answer their Comprehension questions, and then may have their prescription filled before 11:59 PM Eastern Time on 9/22/2022.

## 2022-09-20 ENCOUNTER — TELEPHONE (OUTPATIENT)
Dept: DERMATOLOGY | Facility: CLINIC | Age: 32
End: 2022-09-20

## 2022-09-20 DIAGNOSIS — L70.0 CYSTIC ACNE: ICD-10-CM

## 2022-09-20 RX ORDER — ISOTRETINOIN 40 MG/1
CAPSULE, LIQUID FILLED ORAL
Qty: 60 CAPSULE | Refills: 0 | OUTPATIENT
Start: 2022-09-20

## 2022-09-20 NOTE — TELEPHONE ENCOUNTER
Prior Authorization Retail Medication Request    Medication/Dose: ISOtretinoin (ACCUTANE) 40 MG capsule  ICD code (if different than what is on RX): Cystic acne [L70.0]   Previously Tried and Failed:    Rationale:      Insurance Name:  Insurance ID:      Pharmacy Information (if different than what is on RX)  [id number] [place], [place] - [address]  Phone:  [phone]

## 2022-09-20 NOTE — TELEPHONE ENCOUNTER
[facility] Call Center    Phone Message    May a detailed message be left on voicemail: yes     Reason for Call: Medication Question or concern regarding medication   Prescription Clarification  Name of Medication: ISOtretinoin (ACCUTANE) 40 MG capsule  Prescribing Provider: Dr. Brannon   Pharmacy: [facility] [id number] [place], [place] - [address] [place] [address]   What on the order needs clarification? Pt only has one day of Rx left. Pt changed jobs and now has changed insurance. I updated her job information and insurance for her. Pt now has [employer] insurance. Pt is concerned about getting her Rx on time. Please review and check pt will get the order in Pharmacy on time. Thanks           Action Taken: Message routed to:  [facility] Derm    Travel Screening: Not Applicable

## 2022-09-21 NOTE — TELEPHONE ENCOUNTER
There are no pharmacy benefits listed for patient in epic            Reached out to pharmacy and they do not have insurance for patient either.  Patient need to give insurance info to pharmacy to see if plan requires a PA.  Once the pharmacy runs a claim we will be able to do a PA.

## 2022-09-21 NOTE — TELEPHONE ENCOUNTER
RN called patient to let her know that the pharmacy needs a phone call from patient, and need their insurance information. Patient stated that she gave the pharmacy her insurance information, but as of 50 minutes ago, they told specialty medications that they did not have it. Let patient know this, and she will re-reach out to [facility] [address].    Briseida LEON RN

## 2022-09-22 ENCOUNTER — TELEPHONE (OUTPATIENT)
Dept: DERMATOLOGY | Facility: CLINIC | Age: 32
End: 2022-09-22

## 2022-09-22 NOTE — TELEPHONE ENCOUNTER
Central Prior Authorization Team   Phone: [phone]    PA Initiation    Medication: Accutane  Insurance Company: Other (see comments)  Pharmacy [facility]illing [id number] [place] [facility] [place], [place] - [address]  Filling Pharmacy Phone: [phone]  Filling Pharmacy Fax:    Start Date: 9/22/2022

## 2022-09-22 NOTE — TELEPHONE ENCOUNTER
Jennifer Siu   Technician      Telephone Encounter   Signed   Creation Time:  9/20/2022  5:25 PM                    Prior Authorization Retail Medication Request     Medication/Dose: ISOtretinoin (ACCUTANE) 40 MG capsule  ICD code (if different than what is on RX): Cystic acne [L70.0]   Previously Tried and Failed:    Rationale:       Insurance Name:  Insurance ID:       Pharmacy Information (if different than what is on RX)  Name:  [id number] [place] [address]  Phone:  [phone]

## 2022-09-22 NOTE — TELEPHONE ENCOUNTER
"Called pharmacy. Pharmacy states they have run a claim and need a PA to be started per patient's insurance. [facility] insurance was updated 9/20/2022. Pharmacy did not say if it was updated by patient or by external source. [facility] patient's new insurance on file is   \"[employer]\"  Member ID: [id number]  Help Desk #:[phone].     Yann Garcia, EMT    "

## 2022-09-22 NOTE — TELEPHONE ENCOUNTER
"Yann Garcia         10:53 AM  Note  Called pharmacy. Pharmacy states they have run a claim and need a PA to be started per patient's insurance. [facility] insurance was updated 9/20/2022. Pharmacy did not say if it was updated by patient or by external source. [facility] patient's new insurance on file is   \"[employer]\"  Member ID: [id number]  Help Desk #:[phone].           "

## 2022-09-23 NOTE — TELEPHONE ENCOUNTER
Called [facility] to follow up on request [phone]    Per rep they did receive the request for isotretinoin.  Currently still under review at this time. This is marked for urgent review.

## 2022-09-26 NOTE — TELEPHONE ENCOUNTER
I called and spoke with Catalina and informed her that the PA is still In process.    MIGUEL A Cabral

## 2022-09-27 NOTE — TELEPHONE ENCOUNTER
Prior Authorization Approval    Authorization Effective Date: 9/26/2022  Authorization Expiration Date: N/A   Medication: Accutane  Approved Dose/Quantity:   Reference #:     Insurance Company: Other (see comments)  Expected CoPay:       CoPay Card Available:      Foundation Assistance Needed:    Which Pharmacy is filling the prescription (Not needed for infusion/clinic administered): [id number] [place], [place] - [address] [place] [address]  Pharmacy Notified: Yes  Patient Notified: Yes

## 2022-10-03 ENCOUNTER — OFFICE VISIT (OUTPATIENT)
Dept: FAMILY MEDICINE | Facility: CLINIC | Age: 32
End: 2022-10-03
Payer: COMMERCIAL

## 2022-10-03 VITALS
SYSTOLIC BLOOD PRESSURE: 100 MMHG | OXYGEN SATURATION: 97 % | RESPIRATION RATE: 16 BRPM | WEIGHT: 210.4 LBS | DIASTOLIC BLOOD PRESSURE: 68 MMHG | HEART RATE: 61 BPM | TEMPERATURE: 98.3 F | BODY MASS INDEX: 33.96 KG/M2

## 2022-10-03 DIAGNOSIS — J35.8 TONSILLAR CALCULUS: Primary | ICD-10-CM

## 2022-10-03 DIAGNOSIS — Z51.81 THERAPEUTIC DRUG MONITORING: ICD-10-CM

## 2022-10-03 DIAGNOSIS — Z97.5 NEXPLANON IN PLACE: ICD-10-CM

## 2022-10-03 PROBLEM — L70.0 ACNE VULGARIS: Status: ACTIVE | Noted: 2019-11-27

## 2022-10-03 PROBLEM — S83.8X1D: Status: ACTIVE | Noted: 2019-10-21

## 2022-10-03 LAB
B-HCG SERPL-ACNC: <1 IU/L (ref 0–5)
ERYTHROCYTE [DISTWIDTH] IN BLOOD BY AUTOMATED COUNT: 11.9 % (ref 10–15)
HCT VFR BLD AUTO: 35.5 % (ref 35–47)
HGB BLD-MCNC: 12 G/DL (ref 11.7–15.7)
MCH RBC QN AUTO: 30.7 PG (ref 26.5–33)
MCHC RBC AUTO-ENTMCNC: 33.8 G/DL (ref 31.5–36.5)
MCV RBC AUTO: 91 FL (ref 78–100)
PLATELET # BLD AUTO: 271 10E3/UL (ref 150–450)
RBC # BLD AUTO: 3.91 10E6/UL (ref 3.8–5.2)
WBC # BLD AUTO: 6.3 10E3/UL (ref 4–11)

## 2022-10-03 PROCEDURE — 36415 COLL VENOUS BLD VENIPUNCTURE: CPT | Performed by: FAMILY MEDICINE

## 2022-10-03 PROCEDURE — 90686 IIV4 VACC NO PRSV 0.5 ML IM: CPT | Performed by: FAMILY MEDICINE

## 2022-10-03 PROCEDURE — 85027 COMPLETE CBC AUTOMATED: CPT | Performed by: FAMILY MEDICINE

## 2022-10-03 PROCEDURE — 99214 OFFICE O/P EST MOD 30 MIN: CPT | Mod: 25 | Performed by: FAMILY MEDICINE

## 2022-10-03 PROCEDURE — 90471 IMMUNIZATION ADMIN: CPT | Performed by: FAMILY MEDICINE

## 2022-10-03 PROCEDURE — 84702 CHORIONIC GONADOTROPIN TEST: CPT | Performed by: FAMILY MEDICINE

## 2022-10-03 NOTE — PROGRESS NOTES
[facility]  [address], [address]  [facility] [address]  Phone: [phone]  Primary Provider: Ashley Linda  Pre-op Performing Provider: ASHLEY LINDA    PREOPERATIVE EVALUATION:  Today's date: 10/3/2022    Catalina Foster is a 31 year old female who presents for a preoperative evaluation.    Surgical Information:  Surgery/Procedure: Tonsillectomy   Surgery Location: [facility]  Surgeon: Dr. Deepali Enriquez  Surgery Date: 11/01/2022  Time of Surgery: TBD  Where patient plans to recover: At home with family  Fax number for surgical facility: Note does not need to be faxed, will be available electronically in Epic.    Type of Anesthesia Anticipated: to be determined    Assessment & Plan     The proposed surgical procedure is considered LOW risk.    Tonsillar calculus  Status:   Assessment:   Plan:  - CBC with platelets; Future    Nexplanon in place  Nexaplnon inserted in 06/2022      Therapeutic drug monitoring  Patient will start Accutane soon. Test ordered by Dermatologist.  - HCG Quantitative Pregnancy, Blood ([id number])         Risks and Recommendations:   - No identified additional risk factors    Medication Instructions:  Patient is to take all scheduled medications on the day of surgery    RECOMMENDATION:  APPROVAL GIVEN to proceed with proposed procedure, without further diagnostic evaluation.        Ashley Linda MD  [facility]  PAGER: [phone] or (W): [phone]      Subjective     HPI related to upcoming procedure: The patient has a long history of recurrent calculi.  Desires to proceed with tonsillectomy.    Preop Questions 9/26/2022   1. Have you ever had a heart attack or stroke? No   2. Have you ever had surgery on your heart or blood vessels, such as a stent placement, a coronary artery bypass, or surgery on an artery in your head, neck, heart, or legs? No   3. Do you have chest pain with activity? No   4. Do you have a history of   heart failure? No   5. Do you currently have a cold, bronchitis or symptoms of other infection? No   6. Do you have a cough, shortness of breath, or wheezing? No   7. Do you or anyone in your family have previous history of blood clots? No   8. Do you or does anyone in your family have a serious bleeding problem such as prolonged bleeding following surgeries or cuts? No   9. Have you ever had problems with anemia or been told to take iron pills? YES - previous history of anemia. Recent hemoglobin is 12.2 mg/dl   10. Have you had any abnormal blood loss such as black, tarry or bloody stools, or abnormal vaginal bleeding? No   11. Have you ever had a blood transfusion? No   12. Are you willing to have a blood transfusion if it is medically needed before, during, or after your surgery? Yes   13. Have you or any of your relatives ever had problems with anesthesia? No   14. Do you have sleep apnea, excessive snoring or daytime drowsiness? No   15. Do you have any artifical heart valves or other implanted medical devices like a pacemaker, defibrillator, or continuous glucose monitor? No   16. Do you have artificial joints? No   17. Are you allergic to latex? No   18. Is there any chance that you may be pregnant? No     Preoperative Review of :   reviewed - no record of controlled substances prescribed.      Review of Systems  Constitutional, neuro, ENT, endocrine, pulmonary, cardiac, gastrointestinal, genitourinary, musculoskeletal, integument and psychiatric systems are negative, except as otherwise noted.    Patient Active Problem List    Diagnosis Date Noted     Nexplanon in place 06/2022     Priority: Medium     Acne vulgaris 11/27/2019     Priority: Medium     Acute medial meniscal injury of knee, right, subsequent encounter 10/21/2019     Priority: Medium     Screening for malignant neoplasm of cervix 05/12/2014     Priority: Medium     Formatting of this note might be different from the original.  2014  NILM 2017 NILM  26 y.o.   Plan: Pap test 6/2020    ; Pap test history  Formatting of this note might be different from the original.  2014 NILM  2017 NILM  26 y.o.   Plan: Pap test 6/2020    ; Pap test history        History reviewed. No pertinent past medical history.  Past Surgical History:   Procedure Laterality Date     NASAL SEPTUM SURGERY      06/2021     Current Outpatient Medications   Medication Sig Dispense Refill     etonogestrel (NEXPLANON) 68 MG IMPL 1 each (68 mg) by Subdermal route once       ISOtretinoin (ACCUTANE) 40 MG capsule Take 1 capsule (40 mg) by mouth 2 times daily 60 capsule 0     Probiotic Product (UP4 PROBIOTICS WOMENS) CAPS Take 1 capsule by mouth         Allergies   Allergen Reactions     Clindamycin Hives and Rash     Orally, topical OK  Orally, topical OK          Social History     Tobacco Use     Smoking status: Never Smoker     Smokeless tobacco: Never Used   Substance Use Topics     Alcohol use: Not Currently     History   Drug Use     Types: Marijuana     Comment: occasional         Objective     /68   Pulse 61   Temp 98.3  F (36.8  C) (Tympanic)   Resp 16   Wt 95.4 kg (210 lb 6.4 oz)   SpO2 97%   BMI 33.96 kg/m      Physical Exam    GENERAL APPEARANCE: healthy, alert and no distress     EYES: EOMI, PERRL     HENT: ear canals and TM's normal and nose and mouth without ulcers or lesions     NECK: no adenopathy, no asymmetry, masses, or scars and thyroid normal to palpation     RESP: lungs clear to auscultation - no rales, rhonchi or wheezes     CV: regular rates and rhythm, normal S1 S2, no S3 or S4 and no murmur, click or rub     ABDOMEN:  soft, nontender, no HSM or masses and bowel sounds normal     MS: extremities normal- no gross deformities noted, no evidence of inflammation in joints, FROM in all extremities.     SKIN: no suspicious lesions or rashes     NEURO: Normal strength and tone, sensory exam grossly normal, mentation intact and speech normal     PSYCH:  mentation appears normal. and affect normal/bright     LYMPHATICS: No cervical adenopathy    Recent Labs   Lab Test 09/13/22  0841 04/20/22  1045   HGB 12.2 12.6    302   NA  --  135   POTASSIUM  --  3.9   CR  --  0.62   A1C  --  5.2        Diagnostics:  Recent Results (from the past 24 hour(s))   CBC with platelets    Collection Time: 10/03/22  3:01 PM   Result Value Ref Range    WBC Count 6.3 4.0 - 11.0 10e3/uL    RBC Count 3.91 3.80 - 5.20 10e6/uL    Hemoglobin 12.0 11.7 - 15.7 g/dL    Hematocrit 35.5 35.0 - 47.0 %    MCV 91 78 - 100 fL    MCH 30.7 26.5 - 33.0 pg    MCHC 33.8 31.5 - 36.5 g/dL    RDW 11.9 10.0 - 15.0 %    Platelet Count 271 150 - 450 10e3/uL      No EKG required for low risk surgery (cataract, skin procedure, breast biopsy, etc).    Revised Cardiac Risk Index (RCRI):  The patient has the following serious cardiovascular risks for perioperative complications:   - No serious cardiac risks = 0 points     RCRI Interpretation: 0 points: Class I (very low risk - 0.4% complication rate)           Signed Electronically by: Estefany Pham MD  Copy of this evaluation report is provided to requesting physician.

## 2022-10-03 NOTE — H&P (VIEW-ONLY)
[facility]  [address], [address]  [facility] [address]  Phone: [phone]  Primary Provider: Ashley Linda  Pre-op Performing Provider: ASHLEY LINDA    PREOPERATIVE EVALUATION:  Today's date: 10/3/2022    Catalina Foster is a 31 year old female who presents for a preoperative evaluation.    Surgical Information:  Surgery/Procedure: Tonsillectomy   Surgery Location: [facility]  Surgeon: Dr. Deepali Enriquez  Surgery Date: 11/01/2022  Time of Surgery: TBD  Where patient plans to recover: At home with family  Fax number for surgical facility: Note does not need to be faxed, will be available electronically in Epic.    Type of Anesthesia Anticipated: to be determined    Assessment & Plan     The proposed surgical procedure is considered LOW risk.    Tonsillar calculus  Status:   Assessment:   Plan:  - CBC with platelets; Future    Nexplanon in place  Nexaplnon inserted in 06/2022      Therapeutic drug monitoring  Patient will start Accutane soon. Test ordered by Dermatologist.  - HCG Quantitative Pregnancy, Blood ([id number])         Risks and Recommendations:   - No identified additional risk factors    Medication Instructions:  Patient is to take all scheduled medications on the day of surgery    RECOMMENDATION:  APPROVAL GIVEN to proceed with proposed procedure, without further diagnostic evaluation.        Ashley Linda MD  [facility]  PAGER: [phone] or (W): [phone]      Subjective     HPI related to upcoming procedure: The patient has a long history of recurrent calculi.  Desires to proceed with tonsillectomy.    Preop Questions 9/26/2022   1. Have you ever had a heart attack or stroke? No   2. Have you ever had surgery on your heart or blood vessels, such as a stent placement, a coronary artery bypass, or surgery on an artery in your head, neck, heart, or legs? No   3. Do you have chest pain with activity? No   4. Do you have a history of   heart failure? No   5. Do you currently have a cold, bronchitis or symptoms of other infection? No   6. Do you have a cough, shortness of breath, or wheezing? No   7. Do you or anyone in your family have previous history of blood clots? No   8. Do you or does anyone in your family have a serious bleeding problem such as prolonged bleeding following surgeries or cuts? No   9. Have you ever had problems with anemia or been told to take iron pills? YES - previous history of anemia. Recent hemoglobin is 12.2 mg/dl   10. Have you had any abnormal blood loss such as black, tarry or bloody stools, or abnormal vaginal bleeding? No   11. Have you ever had a blood transfusion? No   12. Are you willing to have a blood transfusion if it is medically needed before, during, or after your surgery? Yes   13. Have you or any of your relatives ever had problems with anesthesia? No   14. Do you have sleep apnea, excessive snoring or daytime drowsiness? No   15. Do you have any artifical heart valves or other implanted medical devices like a pacemaker, defibrillator, or continuous glucose monitor? No   16. Do you have artificial joints? No   17. Are you allergic to latex? No   18. Is there any chance that you may be pregnant? No     Preoperative Review of :   reviewed - no record of controlled substances prescribed.      Review of Systems  Constitutional, neuro, ENT, endocrine, pulmonary, cardiac, gastrointestinal, genitourinary, musculoskeletal, integument and psychiatric systems are negative, except as otherwise noted.    Patient Active Problem List    Diagnosis Date Noted     Nexplanon in place 06/2022     Priority: Medium     Acne vulgaris 11/27/2019     Priority: Medium     Acute medial meniscal injury of knee, right, subsequent encounter 10/21/2019     Priority: Medium     Screening for malignant neoplasm of cervix 05/12/2014     Priority: Medium     Formatting of this note might be different from the original.  2014  NILM 2017 NILM  26 y.o.   Plan: Pap test 6/2020    ; Pap test history  Formatting of this note might be different from the original.  2014 NILM  2017 NILM  26 y.o.   Plan: Pap test 6/2020    ; Pap test history        History reviewed. No pertinent past medical history.  Past Surgical History:   Procedure Laterality Date     NASAL SEPTUM SURGERY      06/2021     Current Outpatient Medications   Medication Sig Dispense Refill     etonogestrel (NEXPLANON) 68 MG IMPL 1 each (68 mg) by Subdermal route once       ISOtretinoin (ACCUTANE) 40 MG capsule Take 1 capsule (40 mg) by mouth 2 times daily 60 capsule 0     Probiotic Product (UP4 PROBIOTICS WOMENS) CAPS Take 1 capsule by mouth         Allergies   Allergen Reactions     Clindamycin Hives and Rash     Orally, topical OK  Orally, topical OK          Social History     Tobacco Use     Smoking status: Never Smoker     Smokeless tobacco: Never Used   Substance Use Topics     Alcohol use: Not Currently     History   Drug Use     Types: Marijuana     Comment: occasional         Objective     /68   Pulse 61   Temp 98.3  F (36.8  C) (Tympanic)   Resp 16   Wt 95.4 kg (210 lb 6.4 oz)   SpO2 97%   BMI 33.96 kg/m      Physical Exam    GENERAL APPEARANCE: healthy, alert and no distress     EYES: EOMI, PERRL     HENT: ear canals and TM's normal and nose and mouth without ulcers or lesions     NECK: no adenopathy, no asymmetry, masses, or scars and thyroid normal to palpation     RESP: lungs clear to auscultation - no rales, rhonchi or wheezes     CV: regular rates and rhythm, normal S1 S2, no S3 or S4 and no murmur, click or rub     ABDOMEN:  soft, nontender, no HSM or masses and bowel sounds normal     MS: extremities normal- no gross deformities noted, no evidence of inflammation in joints, FROM in all extremities.     SKIN: no suspicious lesions or rashes     NEURO: Normal strength and tone, sensory exam grossly normal, mentation intact and speech normal     PSYCH:  mentation appears normal. and affect normal/bright     LYMPHATICS: No cervical adenopathy    Recent Labs   Lab Test 09/13/22  0841 04/20/22  1045   HGB 12.2 12.6    302   NA  --  135   POTASSIUM  --  3.9   CR  --  0.62   A1C  --  5.2        Diagnostics:  Recent Results (from the past 24 hour(s))   CBC with platelets    Collection Time: 10/03/22  3:01 PM   Result Value Ref Range    WBC Count 6.3 4.0 - 11.0 10e3/uL    RBC Count 3.91 3.80 - 5.20 10e6/uL    Hemoglobin 12.0 11.7 - 15.7 g/dL    Hematocrit 35.5 35.0 - 47.0 %    MCV 91 78 - 100 fL    MCH 30.7 26.5 - 33.0 pg    MCHC 33.8 31.5 - 36.5 g/dL    RDW 11.9 10.0 - 15.0 %    Platelet Count 271 150 - 450 10e3/uL      No EKG required for low risk surgery (cataract, skin procedure, breast biopsy, etc).    Revised Cardiac Risk Index (RCRI):  The patient has the following serious cardiovascular risks for perioperative complications:   - No serious cardiac risks = 0 points     RCRI Interpretation: 0 points: Class I (very low risk - 0.4% complication rate)           Signed Electronically by: Estefany Pham MD  Copy of this evaluation report is provided to requesting physician.

## 2022-10-05 ENCOUNTER — MYC MEDICAL ADVICE (OUTPATIENT)
Dept: OBGYN | Facility: CLINIC | Age: 32
End: 2022-10-05

## 2022-10-05 DIAGNOSIS — Z30.09 EMERGENCY CONTRACEPTIVE COUNSELING: Primary | ICD-10-CM

## 2022-10-05 NOTE — TELEPHONE ENCOUNTER
Route to provider: Catalina requests PRN prescription for plan B    Taking Accutane since August 2022. Using Nexplanon as first form of birth control, condoms as second form.     Wants Plan B just in case    BMI 33.96    RX for [employer] pended - please sign if appropriate

## 2022-10-10 NOTE — PATIENT INSTRUCTIONS
[facility] Dermatology Visit    Thank you for allowing us to participate in your care. Your findings, instructions and follow-up plan are as follows:         When should I call my doctor?  If you are worsening or not improving, please, contact us or seek urgent care as noted below.     Who should I call with questions (adults)?  [facility] (adult and pediatric): [phone]  [facility] (adult): [phone]  For urgent needs outside of business hours call the [facility] at [phone] and ask for the dermatology resident on call  If this is a medical emergency and you are unable to reach an ER, Call 911    Who should I call with questions (pediatric)?  [facility]- Pediatric Dermatology  Dr. Cely Kurtz, Dr. Robb Herring, Dr. Sharona Anguiano, Shanice Oconnor, PA  Dr. Lima Ball, Dr. Deedee Samano & Dr. Koko Townsend  Non Urgent  Nurse Triage Line; [phone]- Ashley and Katya RN Care Coordinators   [place] (/Complex ) [phone]    If you need a prescription refill, please contact your pharmacy. Refills are approved or denied by our physicians during normal business hours, Monday through Fridays  Per office policy, refills will not be granted if you have not been seen within the past year (or sooner depending on your child's condition).    Scheduling Information:  Pediatric Appointment Scheduling and Call Center [phone]  Radiology Scheduling- [phone]  Sedation Unit Scheduling- [phone]  [place] Scheduling- General [phone]; Pediatric Dermatology [phone]  Main  Services: [phone]  Jordanian: [phone]  Cape Verdean: [phone]  Hmong/Tushar/British: [phone]  Preadmission Nursing Department Fax Number: [phone] (fax all pre-operative paperwork to this number)    For urgent matters arising during evenings, weekends, or  holidays that cannot wait for normal business hours please call [phone] and ask for the dermatology resident on call to be paged.

## 2022-10-10 NOTE — PROGRESS NOTES
[facility] Dermatology Note  Encounter Date: Oct 11, 2022  Telephone ([phone]). Location of teledermatologist: [facility] [place]. Start time: 9:15 am. End time: 9:24 am.    Dermatology Problem List:  1. Acne vulgaris   - Current treatment: isotretinoin 40 mg daily started 8/12/2022, increased to 40 mg BID 9/13/2022  - Prior tx: clindamycin 1% lotion, Retin-A 0.025%, spironolactone, doxycycline   ____________________________________________    Assessment & Plan:     # Acne vulgaris - chronic, active.  Tolerating side effects well.   - Continue isotretinoin 40 mg BID as below.  - Cumulative dose: 1760 mg (today) 3600 mg (at end of current prescription)  - Goal cumulative dose of 13,050 mg - 19,140 mg (150-220 mg/kg in this 89 kg patient)   - IPledge Category: Female of Reproductive Potential (FRP)   - Primary Form of Contraception (if applicable): Nexplanon   - Secondary Form of Contraception (if applicable): Condoms   - Continue gentle cleanser, moisturization  - Patient is getting tonsillectomy in November, recommended discussing stopping isotretinoin with surgeon.    Procedures Performed:    None    Follow-up: 10/28 or 11/15 via telephone pending otolaryngology recommendation    Staff and Scribe:     Scribe Disclosure:  I, Giovani Barajas, am serving as a scribe to document services personally performed by Chuckie Hernandes MD based on data collection and the provider's statements to me.     Staff attestation:  The documentation recorded by the scribe accurately reflects the services I personally performed and the decisions I personally made. I have made edits where needed.    Chuckie Hernandes MD  Staff Dermatologist and Dermatopathologist  , Department of Dermatology   ____________________________________________    CC: Accutane (No changes in the last week.  Just started Accutane last week.  Getting surgery on November 1st.  Markus  removal.   )    HPI:  Ms. Catalina Foster is a(n) 31 year old female who presents today as a return patient for Accutane follow-up. Last seen in dermatology by Dr. Brannon on 9/13/2022, at which time patient was increased on isotretinoin from 40 mg daily to 40 mg BID for treatment of acne vulgaris.    Today, patient reports that her insurance changed, so she was unable to start second month of Accutane until 1 week ago. She is scheduled for tonsillectomy 11/1.    Patient is otherwise feeling well, without additional skin concerns.    Labs Reviewed:  N/A    Physical Exam:  Vitals: There were no vitals taken for this visit.  SKIN: Teledermatology photos were reviewed; image quality and interpretability: acceptable. Image date: 10/11/2022.  - Mild exfoliative erythema of the brows and central forehead.  - Residual acneiform papules and papulonodules on lower face near chin.  - No other lesions of concern on areas examined.     Medications:  Current Outpatient Medications   Medication     etonogestrel (NEXPLANON) 68 MG IMPL     ISOtretinoin (ACCUTANE) 40 MG capsule     Probiotic Product (UP4 PROBIOTICS WOMENS) CAPS     Ulipristal Acetate (LANDEN) 30 MG tablet     No current facility-administered medications for this visit.      Past Medical/Surgical History:   Patient Active Problem List   Diagnosis     Acne vulgaris     Acute medial meniscal injury of knee, right, subsequent encounter     Screening for malignant neoplasm of cervix     Nexplanon in place     No past medical history on file.    CC No referring provider defined for this encounter. on close of this encounter.

## 2022-10-11 ENCOUNTER — VIRTUAL VISIT (OUTPATIENT)
Dept: DERMATOLOGY | Facility: CLINIC | Age: 32
End: 2022-10-11
Payer: COMMERCIAL

## 2022-10-11 DIAGNOSIS — L70.0 ACNE VULGARIS: Primary | ICD-10-CM

## 2022-10-11 PROCEDURE — 99214 OFFICE O/P EST MOD 30 MIN: CPT | Mod: GQ | Performed by: DERMATOLOGY

## 2022-10-11 ASSESSMENT — PAIN SCALES - GENERAL: PAINLEVEL: NO PAIN (0)

## 2022-10-11 NOTE — NURSING NOTE
Dermatology Rooming Note    Catalina Foster's goals for this visit include:   Chief Complaint   Patient presents with     Accutane     No changes in the last week.  Just started Accutane last week.  Getting surgery on November 1st.  Tonsill removal.        Aleyda Cornelius, CMA

## 2022-10-11 NOTE — LETTER
10/11/2022       RE: Catalina Foster  [address]  [facility] [address]     Dear Colleague,    Thank you for referring your patient, Catalina Foster, to the [facility] DERMATOLOGY CLINIC [place] at [facility]. Please see a copy of my visit note below.    [facility] Dermatology Note  Encounter Date: Oct 11, 2022  Telephone ([phone]). Location of teledermatologist: [facility] DERMATOLOGY [facility]. Start time: 9:15 am. End time: 9:24 am.    Dermatology Problem List:  1. Acne vulgaris   - Current treatment: isotretinoin 40 mg daily started 8/12/2022, increased to 40 mg BID 9/13/2022  - Prior tx: clindamycin 1% lotion, Retin-A 0.025%, spironolactone, doxycycline   ____________________________________________    Assessment & Plan:     # Acne vulgaris - chronic, active.  Tolerating side effects well.   - Continue isotretinoin 40 mg BID as below.  - Cumulative dose: 1760 mg (today) 3600 mg (at end of current prescription)  - Goal cumulative dose of 13,050 mg - 19,140 mg (150-220 mg/kg in this 89 kg patient)   - IPledge Category: Female of Reproductive Potential (FRP)   - Primary Form of Contraception (if applicable): Nexplanon   - Secondary Form of Contraception (if applicable): Condoms   - Continue gentle cleanser, moisturization  - Patient is getting tonsillectomy in November, recommended discussing stopping isotretinoin with surgeon.    Procedures Performed:    None    Follow-up: 10/28 or 11/15 via telephone pending otolaryngology recommendation    Staff and Scribe:     Scribe Disclosure:  I, Giovani Barajas, am serving as a scribe to document services personally performed by Chuckie Hernandes MD based on data collection and the provider's statements to me.     Staff attestation:  The documentation recorded by the scribe accurately reflects the services I personally performed and the decisions I personally made. I  have made edits where needed.    Chuckie Hernandes MD  Staff Dermatologist and Dermatopathologist  , Department of Dermatology   ____________________________________________    CC: Accutane (No changes in the last week.  Just started Accutane last week.  Getting surgery on November 1st.  Tonsill removal.   )    HPI:  Ms. Catalina Foster is a(n) 31 year old female who presents today as a return patient for Accutane follow-up. Last seen in dermatology by Dr. Brannon on 9/13/2022, at which time patient was increased on isotretinoin from 40 mg daily to 40 mg BID for treatment of acne vulgaris.    Today, patient reports that her insurance changed, so she was unable to start second month of Accutane until 1 week ago. She is scheduled for tonsillectomy 11/1.    Patient is otherwise feeling well, without additional skin concerns.    Labs Reviewed:  N/A    Physical Exam:  Vitals: There were no vitals taken for this visit.  SKIN: Teledermatology photos were reviewed; image quality and interpretability: acceptable. Image date: 10/11/2022.  - Mild exfoliative erythema of the brows and central forehead.  - Residual acneiform papules and papulonodules on lower face near chin.  - No other lesions of concern on areas examined.     Medications:  Current Outpatient Medications   Medication     etonogestrel (NEXPLANON) 68 MG IMPL     ISOtretinoin (ACCUTANE) 40 MG capsule     Probiotic Product (UP4 PROBIOTICS WOMENS) CAPS     Ulipristal Acetate (LANDEN) 30 MG tablet     No current facility-administered medications for this visit.      Past Medical/Surgical History:   Patient Active Problem List   Diagnosis     Acne vulgaris     Acute medial meniscal injury of knee, right, subsequent encounter     Screening for malignant neoplasm of cervix     Nexplanon in place     No past medical history on file.    CC No referring provider defined for this encounter. on close of this encounter.

## 2022-10-12 ENCOUNTER — MYC MEDICAL ADVICE (OUTPATIENT)
Dept: DERMATOLOGY | Facility: CLINIC | Age: 32
End: 2022-10-12

## 2022-10-20 ENCOUNTER — MYC MEDICAL ADVICE (OUTPATIENT)
Dept: DERMATOLOGY | Facility: CLINIC | Age: 32
End: 2022-10-20

## 2022-10-25 ENCOUNTER — VIRTUAL VISIT (OUTPATIENT)
Dept: DERMATOLOGY | Facility: CLINIC | Age: 32
End: 2022-10-25
Payer: COMMERCIAL

## 2022-10-25 DIAGNOSIS — L70.0 ACNE VULGARIS: Primary | ICD-10-CM

## 2022-10-25 DIAGNOSIS — L70.0 CYSTIC ACNE: ICD-10-CM

## 2022-10-25 PROCEDURE — 99214 OFFICE O/P EST MOD 30 MIN: CPT | Mod: GQ | Performed by: DERMATOLOGY

## 2022-10-25 RX ORDER — ISOTRETINOIN 40 MG/1
40 CAPSULE ORAL 2 TIMES DAILY
Qty: 60 CAPSULE | Refills: 0 | Status: SHIPPED | OUTPATIENT
Start: 2022-10-25 | End: 2022-11-30

## 2022-10-25 ASSESSMENT — PAIN SCALES - GENERAL: PAINLEVEL: NO PAIN (0)

## 2022-10-25 NOTE — LETTER
10/25/2022       RE: Catalina Foster  [address]  [facility] [address]     Dear Colleague,    Thank you for referring your patient, Catalina Foster, to the [facility] DERMATOLOGY CLINIC [place] at [facility]. Please see a copy of my visit note below.    [facility] Dermatology Note  Encounter Date: Oct 25, 2022  Telephone ([phone]). Location of teledermatologist: [facility] DERMATOLOGY [facility]. Start time: 10:36 am. End time: 10:45 am.    Dermatology Problem List:  1. Acne vulgaris   - Current treatment: isotretinoin 40 mg daily started 8/12/2022, increased to 40 mg BID 9/13/2022  - Prior tx: clindamycin 1% lotion, Retin-A 0.025%, spironolactone, doxycycline   ____________________________________________    Assessment & Plan:     # Acne vulgaris - chronic, active.  Tolerating side effects from treatment well.   - Continue isotretinoin 40 mg BID as below  - Cumulative dose: 3600 mg   - Goal cumulative dose of 13,050 mg - 19,140 mg (150-220 mg/kg in this 89 kg patient)   - IPledge Category: Female of Reproductive Potential (FRP)   - Primary Form of Contraception (if applicable): Nexplanon   - Secondary Form of Contraception (if applicable): Condoms   - Continue gentle cleanser, moisturization  - Sent same photo of home UPT - reminded to send new photo with date each month  - Not donating blood, sharing medication with others.  - Labs ordered today, to be done at her earliest convenience - if normal, will not need to repeat    Procedures Performed:    None    Follow-up: 1 month    Staff:    Chuckie Hernandes MD  Staff Dermatologist and Dermatopathologist  , Department of Dermatology   ____________________________________________    CC: Accutane (Doing fine.  Chin area seems to be breaking out, really dry.  )    HPI:  Ms. Catalina Foster is a(n) 31 year old female who presents today as a return  patient for Accutane follow-up.    Dryness has been manageable  Lips and chin - breaking out more  No muscle aches, joint aches  Change in mood: annoyed, happy; swings noted - she will keep an eye on this, but already feeling better    Patient is otherwise feeling well, without additional skin concerns.    Labs Reviewed:  N/A    Physical Exam:  Vitals: There were no vitals taken for this visit.  SKIN: Teledermatology photos were reviewed; image quality and interpretability: acceptable. Image date: 10/11/2022.  - Mild exfoliative erythema of the brows and central forehead.  - Residual acneiform papules and papulonodules on lower face near chin.  - No other lesions of concern on areas examined.     Medications:  Current Outpatient Medications   Medication     etonogestrel (NEXPLANON) 68 MG IMPL     ISOtretinoin (ACCUTANE) 40 MG capsule     Probiotic Product (UP4 PROBIOTICS WOMENS) CAPS     Ulipristal Acetate (LANDEN) 30 MG tablet     No current facility-administered medications for this visit.      Past Medical/Surgical History:   Patient Active Problem List   Diagnosis     Acne vulgaris     Acute medial meniscal injury of knee, right, subsequent encounter     Screening for malignant neoplasm of cervix     Nexplanon in place     No past medical history on file.    CC No referring provider defined for this encounter. on close of this encounter.

## 2022-10-25 NOTE — PROGRESS NOTES
[facility] Dermatology Note  Encounter Date: Oct 25, 2022  Telephone ([phone]). Location of teledermatologist: [facility] DERMATOLOGY CLINIC [place]. Start time: 10:36 am. End time: 10:45 am.    Dermatology Problem List:  1. Acne vulgaris   - Current treatment: isotretinoin 40 mg daily started 8/12/2022, increased to 40 mg BID 9/13/2022  - Prior tx: clindamycin 1% lotion, Retin-A 0.025%, spironolactone, doxycycline   ____________________________________________    Assessment & Plan:     # Acne vulgaris - chronic, active.  Tolerating side effects from treatment well.   - Continue isotretinoin 40 mg BID as below  - Cumulative dose: 3600 mg   - Goal cumulative dose of 13,050 mg - 19,140 mg (150-220 mg/kg in this 89 kg patient)   - IPledge Category: Female of Reproductive Potential (FRP)   - Primary Form of Contraception (if applicable): Nexplanon   - Secondary Form of Contraception (if applicable): Condoms   - Continue gentle cleanser, moisturization  - Sent same photo of home UPT - reminded to send new photo with date each month  - Not donating blood, sharing medication with others.  - Labs ordered today, to be done at her earliest convenience - if normal, will not need to repeat    Procedures Performed:    None    Follow-up: 1 month    Staff:    Chuckie Hernandes MD  Staff Dermatologist and Dermatopathologist  , Department of Dermatology   ____________________________________________    CC: Accutane (Doing fine.  Chin area seems to be breaking out, really dry.  )    HPI:  Ms. Catalina Foster is a(n) 31 year old female who presents today as a return patient for Accutane follow-up.    Dryness has been manageable  Lips and chin - breaking out more  No muscle aches, joint aches  Change in mood: annoyed, happy; swings noted - she will keep an eye on this, but already feeling better    Patient is otherwise feeling well, without additional skin concerns.    Labs  Reviewed:  N/A    Physical Exam:  Vitals: There were no vitals taken for this visit.  SKIN: Teledermatology photos were reviewed; image quality and interpretability: acceptable. Image date: 10/11/2022.  - Mild exfoliative erythema of the brows and central forehead.  - Residual acneiform papules and papulonodules on lower face near chin.  - No other lesions of concern on areas examined.     Medications:  Current Outpatient Medications   Medication     etonogestrel (NEXPLANON) 68 MG IMPL     ISOtretinoin (ACCUTANE) 40 MG capsule     Probiotic Product (UP4 PROBIOTICS WOMENS) CAPS     Ulipristal Acetate (LANDEN) 30 MG tablet     No current facility-administered medications for this visit.      Past Medical/Surgical History:   Patient Active Problem List   Diagnosis     Acne vulgaris     Acute medial meniscal injury of knee, right, subsequent encounter     Screening for malignant neoplasm of cervix     Nexplanon in place     No past medical history on file.    CC No referring provider defined for this encounter. on close of this encounter.

## 2022-10-25 NOTE — NURSING NOTE
Dermatology Rooming Note    Catalina Foster's goals for this visit include:   Chief Complaint   Patient presents with     Accutane     Doing fine.  Chin area seems to be breaking out, really dry.       Aleyda Cornelius, CMA

## 2022-10-31 ENCOUNTER — ANESTHESIA EVENT (OUTPATIENT)
Dept: SURGERY | Facility: CLINIC | Age: 32
End: 2022-10-31
Payer: COMMERCIAL

## 2022-11-01 ENCOUNTER — ANESTHESIA (OUTPATIENT)
Dept: SURGERY | Facility: CLINIC | Age: 32
End: 2022-11-01
Payer: COMMERCIAL

## 2022-11-01 ENCOUNTER — HOSPITAL ENCOUNTER (OUTPATIENT)
Facility: CLINIC | Age: 32
Discharge: HOME | End: 2022-11-01
Attending: OTOLARYNGOLOGY | Admitting: OTOLARYNGOLOGY
Payer: COMMERCIAL

## 2022-11-01 VITALS
HEART RATE: 54 BPM | OXYGEN SATURATION: 97 % | DIASTOLIC BLOOD PRESSURE: 58 MMHG | RESPIRATION RATE: 16 BRPM | HEIGHT: 67 IN | WEIGHT: 203.3 LBS | TEMPERATURE: 97.5 F | BODY MASS INDEX: 31.91 KG/M2 | SYSTOLIC BLOOD PRESSURE: 94 MMHG

## 2022-11-01 DIAGNOSIS — J35.01 CHRONIC TONSILLITIS: Primary | ICD-10-CM

## 2022-11-01 PROCEDURE — 258N000003 HC RX IP 258 OP 636: Performed by: ANESTHESIOLOGY

## 2022-11-01 PROCEDURE — 999N000141 HC STATISTIC PRE-PROCEDURE NURSING ASSESSMENT: Performed by: OTOLARYNGOLOGY

## 2022-11-01 PROCEDURE — 250N000011 HC RX IP 250 OP 636: Performed by: NURSE ANESTHETIST, CERTIFIED REGISTERED

## 2022-11-01 PROCEDURE — 258N000003 HC RX IP 258 OP 636: Performed by: NURSE ANESTHETIST, CERTIFIED REGISTERED

## 2022-11-01 PROCEDURE — 710N000010 HC RECOVERY PHASE 1, LEVEL 2, PER MIN: Performed by: OTOLARYNGOLOGY

## 2022-11-01 PROCEDURE — 250N000011 HC RX IP 250 OP 636: Performed by: ANESTHESIOLOGY

## 2022-11-01 PROCEDURE — 88304 TISSUE EXAM BY PATHOLOGIST: CPT | Mod: TC,59 | Performed by: OTOLARYNGOLOGY

## 2022-11-01 PROCEDURE — 250N000011 HC RX IP 250 OP 636: Performed by: OTOLARYNGOLOGY

## 2022-11-01 PROCEDURE — 250N000013 HC RX MED GY IP 250 OP 250 PS 637: Performed by: ANESTHESIOLOGY

## 2022-11-01 PROCEDURE — 370N000017 HC ANESTHESIA TECHNICAL FEE, PER MIN: Performed by: OTOLARYNGOLOGY

## 2022-11-01 PROCEDURE — 710N000012 HC RECOVERY PHASE 2, PER MINUTE: Performed by: OTOLARYNGOLOGY

## 2022-11-01 PROCEDURE — 272N000001 HC OR GENERAL SUPPLY STERILE: Performed by: OTOLARYNGOLOGY

## 2022-11-01 PROCEDURE — 250N000025 HC SEVOFLURANE, PER MIN: Performed by: OTOLARYNGOLOGY

## 2022-11-01 PROCEDURE — 360N000075 HC SURGERY LEVEL 2, PER MIN: Performed by: OTOLARYNGOLOGY

## 2022-11-01 PROCEDURE — 88304 TISSUE EXAM BY PATHOLOGIST: CPT | Mod: 26 | Performed by: PATHOLOGY

## 2022-11-01 PROCEDURE — 250N000009 HC RX 250: Performed by: NURSE ANESTHETIST, CERTIFIED REGISTERED

## 2022-11-01 PROCEDURE — 42826 REMOVAL OF TONSILS: CPT | Performed by: OTOLARYNGOLOGY

## 2022-11-01 RX ORDER — ACETAMINOPHEN 325 MG/1
975 TABLET ORAL ONCE
Status: COMPLETED | OUTPATIENT
Start: 2022-11-01 | End: 2022-11-01

## 2022-11-01 RX ORDER — HYDROMORPHONE HCL IN WATER/PF 6 MG/30 ML
0.4 PATIENT CONTROLLED ANALGESIA SYRINGE INTRAVENOUS EVERY 5 MIN PRN
Status: DISCONTINUED | OUTPATIENT
Start: 2022-11-01 | End: 2022-11-01 | Stop reason: HOSPADM

## 2022-11-01 RX ORDER — PROPOFOL 10 MG/ML
INJECTION, EMULSION INTRAVENOUS CONTINUOUS PRN
Status: DISCONTINUED | OUTPATIENT
Start: 2022-11-01 | End: 2022-11-01

## 2022-11-01 RX ORDER — ONDANSETRON 2 MG/ML
4 INJECTION INTRAMUSCULAR; INTRAVENOUS EVERY 30 MIN PRN
Status: DISCONTINUED | OUTPATIENT
Start: 2022-11-01 | End: 2022-11-01 | Stop reason: HOSPADM

## 2022-11-01 RX ORDER — PROPOFOL 10 MG/ML
INJECTION, EMULSION INTRAVENOUS PRN
Status: DISCONTINUED | OUTPATIENT
Start: 2022-11-01 | End: 2022-11-01

## 2022-11-01 RX ORDER — KETAMINE HYDROCHLORIDE 10 MG/ML
INJECTION INTRAMUSCULAR; INTRAVENOUS PRN
Status: DISCONTINUED | OUTPATIENT
Start: 2022-11-01 | End: 2022-11-01

## 2022-11-01 RX ORDER — ACETAMINOPHEN 325 MG/1
650 TABLET ORAL EVERY 6 HOURS
Qty: 80 TABLET | Refills: 0 | Status: SHIPPED | OUTPATIENT
Start: 2022-11-01 | End: 2022-11-11

## 2022-11-01 RX ORDER — LORAZEPAM 2 MG/ML
.5-1 INJECTION INTRAMUSCULAR
Status: DISCONTINUED | OUTPATIENT
Start: 2022-11-01 | End: 2022-11-01 | Stop reason: HOSPADM

## 2022-11-01 RX ORDER — IBUPROFEN 600 MG/1
600 TABLET, FILM COATED ORAL EVERY 6 HOURS
Qty: 40 TABLET | Refills: 0 | Status: SHIPPED | OUTPATIENT
Start: 2022-11-01 | End: 2022-11-11

## 2022-11-01 RX ORDER — ONDANSETRON 4 MG/1
4 TABLET, FILM COATED ORAL EVERY 8 HOURS PRN
Qty: 15 TABLET | Refills: 0 | Status: SHIPPED | OUTPATIENT
Start: 2022-11-01 | End: 2022-11-06

## 2022-11-01 RX ORDER — OXYCODONE HYDROCHLORIDE 5 MG/1
5 TABLET ORAL EVERY 4 HOURS PRN
Status: DISCONTINUED | OUTPATIENT
Start: 2022-11-01 | End: 2022-11-01 | Stop reason: HOSPADM

## 2022-11-01 RX ORDER — ONDANSETRON 2 MG/ML
INJECTION INTRAMUSCULAR; INTRAVENOUS PRN
Status: DISCONTINUED | OUTPATIENT
Start: 2022-11-01 | End: 2022-11-01

## 2022-11-01 RX ORDER — FENTANYL CITRATE 50 UG/ML
25 INJECTION, SOLUTION INTRAMUSCULAR; INTRAVENOUS
Status: DISCONTINUED | OUTPATIENT
Start: 2022-11-01 | End: 2022-11-01 | Stop reason: HOSPADM

## 2022-11-01 RX ORDER — PREDNISOLONE SODIUM PHOSPHATE 15 MG/5ML
30 SOLUTION ORAL ONCE
Qty: 10 ML | Refills: 0 | Status: SHIPPED | OUTPATIENT
Start: 2022-11-05 | End: 2022-11-05

## 2022-11-01 RX ORDER — FENTANYL CITRATE 50 UG/ML
25 INJECTION, SOLUTION INTRAMUSCULAR; INTRAVENOUS EVERY 5 MIN PRN
Status: DISCONTINUED | OUTPATIENT
Start: 2022-11-01 | End: 2022-11-01 | Stop reason: HOSPADM

## 2022-11-01 RX ORDER — FENTANYL CITRATE 50 UG/ML
INJECTION, SOLUTION INTRAMUSCULAR; INTRAVENOUS PRN
Status: DISCONTINUED | OUTPATIENT
Start: 2022-11-01 | End: 2022-11-01

## 2022-11-01 RX ORDER — SODIUM CHLORIDE, SODIUM LACTATE, POTASSIUM CHLORIDE, CALCIUM CHLORIDE 600; 310; 30; 20 MG/100ML; MG/100ML; MG/100ML; MG/100ML
INJECTION, SOLUTION INTRAVENOUS CONTINUOUS
Status: DISCONTINUED | OUTPATIENT
Start: 2022-11-01 | End: 2022-11-01 | Stop reason: HOSPADM

## 2022-11-01 RX ORDER — DIPHENHYDRAMINE HYDROCHLORIDE AND LIDOCAINE HYDROCHLORIDE AND ALUMINUM HYDROXIDE AND MAGNESIUM HYDRO
10 KIT EVERY 6 HOURS PRN
Qty: 500 ML | Refills: 1 | Status: SHIPPED | OUTPATIENT
Start: 2022-11-01 | End: 2022-11-11

## 2022-11-01 RX ORDER — HALOPERIDOL 5 MG/ML
1 INJECTION INTRAMUSCULAR
Status: COMPLETED | OUTPATIENT
Start: 2022-11-01 | End: 2022-11-01

## 2022-11-01 RX ORDER — MEPERIDINE HYDROCHLORIDE 25 MG/ML
12.5 INJECTION INTRAMUSCULAR; INTRAVENOUS; SUBCUTANEOUS
Status: DISCONTINUED | OUTPATIENT
Start: 2022-11-01 | End: 2022-11-01 | Stop reason: HOSPADM

## 2022-11-01 RX ORDER — TRAMADOL HYDROCHLORIDE 50 MG/1
50 TABLET ORAL EVERY 6 HOURS PRN
Qty: 50 TABLET | Refills: 0 | Status: SHIPPED | OUTPATIENT
Start: 2022-11-01 | End: 2022-11-11

## 2022-11-01 RX ORDER — LIDOCAINE HYDROCHLORIDE 10 MG/ML
INJECTION, SOLUTION INFILTRATION; PERINEURAL PRN
Status: DISCONTINUED | OUTPATIENT
Start: 2022-11-01 | End: 2022-11-01

## 2022-11-01 RX ORDER — LIDOCAINE 40 MG/G
CREAM TOPICAL
Status: DISCONTINUED | OUTPATIENT
Start: 2022-11-01 | End: 2022-11-01 | Stop reason: HOSPADM

## 2022-11-01 RX ORDER — KETOROLAC TROMETHAMINE 30 MG/ML
15 INJECTION, SOLUTION INTRAMUSCULAR; INTRAVENOUS EVERY 6 HOURS PRN
Status: DISCONTINUED | OUTPATIENT
Start: 2022-11-01 | End: 2022-11-01

## 2022-11-01 RX ORDER — ONDANSETRON 4 MG/1
4 TABLET, ORALLY DISINTEGRATING ORAL EVERY 30 MIN PRN
Status: DISCONTINUED | OUTPATIENT
Start: 2022-11-01 | End: 2022-11-01 | Stop reason: HOSPADM

## 2022-11-01 RX ORDER — DEXAMETHASONE SODIUM PHOSPHATE 10 MG/ML
10 INJECTION, SOLUTION INTRAMUSCULAR; INTRAVENOUS ONCE
Status: COMPLETED | OUTPATIENT
Start: 2022-11-01 | End: 2022-11-01

## 2022-11-01 RX ADMIN — PROPOFOL 180 MG: 10 INJECTION, EMULSION INTRAVENOUS at 07:38

## 2022-11-01 RX ADMIN — LIDOCAINE HYDROCHLORIDE 40 MG: 10 INJECTION, SOLUTION INFILTRATION; PERINEURAL at 07:38

## 2022-11-01 RX ADMIN — KETAMINE HYDROCHLORIDE 30 MG: 10 INJECTION, SOLUTION INTRAMUSCULAR; INTRAVENOUS at 07:38

## 2022-11-01 RX ADMIN — HALOPERIDOL LACTATE 1 MG: 5 INJECTION, SOLUTION INTRAMUSCULAR at 08:36

## 2022-11-01 RX ADMIN — MIDAZOLAM 2 MG: 1 INJECTION INTRAMUSCULAR; INTRAVENOUS at 07:27

## 2022-11-01 RX ADMIN — DEXAMETHASONE SODIUM PHOSPHATE 10 MG: 10 INJECTION, SOLUTION INTRAMUSCULAR; INTRAVENOUS at 07:38

## 2022-11-01 RX ADMIN — DEXMEDETOMIDINE HYDROCHLORIDE 8 MCG: 100 INJECTION, SOLUTION INTRAVENOUS at 07:48

## 2022-11-01 RX ADMIN — SODIUM CHLORIDE, POTASSIUM CHLORIDE, SODIUM LACTATE AND CALCIUM CHLORIDE: 600; 310; 30; 20 INJECTION, SOLUTION INTRAVENOUS at 08:44

## 2022-11-01 RX ADMIN — FENTANYL CITRATE 25 MCG: 50 INJECTION, SOLUTION INTRAMUSCULAR; INTRAVENOUS at 08:17

## 2022-11-01 RX ADMIN — FENTANYL CITRATE 100 MCG: 50 INJECTION, SOLUTION INTRAMUSCULAR; INTRAVENOUS at 07:38

## 2022-11-01 RX ADMIN — PROPOFOL 50 MCG/KG/MIN: 10 INJECTION, EMULSION INTRAVENOUS at 07:38

## 2022-11-01 RX ADMIN — FENTANYL CITRATE 25 MCG: 50 INJECTION, SOLUTION INTRAMUSCULAR; INTRAVENOUS at 08:23

## 2022-11-01 RX ADMIN — ACETAMINOPHEN 975 MG: 325 TABLET, FILM COATED ORAL at 06:22

## 2022-11-01 RX ADMIN — ROCURONIUM BROMIDE 40 MG: 10 INJECTION, SOLUTION INTRAVENOUS at 07:38

## 2022-11-01 RX ADMIN — SODIUM CHLORIDE, POTASSIUM CHLORIDE, SODIUM LACTATE AND CALCIUM CHLORIDE: 600; 310; 30; 20 INJECTION, SOLUTION INTRAVENOUS at 06:24

## 2022-11-01 RX ADMIN — SUGAMMADEX 200 MG: 100 INJECTION, SOLUTION INTRAVENOUS at 07:55

## 2022-11-01 RX ADMIN — ONDANSETRON 4 MG: 2 INJECTION INTRAMUSCULAR; INTRAVENOUS at 07:51

## 2022-11-01 RX ADMIN — DEXMEDETOMIDINE HYDROCHLORIDE 8 MCG: 100 INJECTION, SOLUTION INTRAVENOUS at 08:00

## 2022-11-01 RX ADMIN — DEXMEDETOMIDINE HYDROCHLORIDE 4 MCG: 100 INJECTION, SOLUTION INTRAVENOUS at 08:03

## 2022-11-01 ASSESSMENT — ACTIVITIES OF DAILY LIVING (ADL)
ADLS_ACUITY_SCORE: 35
ADLS_ACUITY_SCORE: 35

## 2022-11-01 NOTE — ANESTHESIA CARE TRANSFER NOTE
Patient: Catalina Foster    Procedure: Procedure(s):  TONSILLECTOMY       Diagnosis: Calculus of tonsil [J35.8]  Chronic tonsillitis [J35.01]  Diagnosis Additional Information: No value filed.    Anesthesia Type:   General     Note:    Oropharynx: oropharynx clear of all foreign objects and spontaneously breathing  Level of Consciousness: awake  Oxygen Supplementation: face mask  Level of Supplemental Oxygen (L/min / FiO2): 8  Independent Airway: airway patency satisfactory and stable    Vital Signs Stable: post-procedure vital signs reviewed and stable  Report to RN Given: handoff report given  Patient transferred to: PACU    Handoff Report: Identifed the Patient, Identified the Reponsible Provider, Reviewed the pertinent medical history, Discussed the surgical course, Reviewed Intra-OP anesthesia mangement and issues during anesthesia, Set expectations for post-procedure period and Allowed opportunity for questions and acknowledgement of understanding      Vitals:  Vitals Value Taken Time   /58 11/01/22 0805   Temp 36.5  C (97.7  F) 11/01/22 0805   Pulse 79 11/01/22 0805   Resp 12 11/01/22 0805   SpO2 97 % 11/01/22 0805       Electronically Signed By: SKYLER PITTMAN CRNA  November 1, 2022  8:07 AM

## 2022-11-01 NOTE — ANESTHESIA PROCEDURE NOTES
Airway       Patient location during procedure: OR       Procedure Start/Stop Times: 11/1/2022 7:41 AM  Staff -        Anesthesiologist:  Priyank Jay MD       CRNA: Anna Singh APRN CRNA       Performed By: CRNA  Consent for Airway        Urgency: elective  Indications and Patient Condition       Indications for airway management: marisa-procedural         Mask difficulty assessment: 1 - vent by mask    Final Airway Details       Final airway type: endotracheal airway       Successful airway: DERRELL  Endotracheal Airway Details        ETT size (mm): 7.0       Cuffed: yes       Successful intubation technique: direct laryngoscopy       DL Blade Type: Randall 2       Grade View of Cords: 1       Adjucts: stylet       Position: Center       Measured from: lips       Bite block used: None    Post intubation assessment        Placement verified by: capnometry and equal breath sounds        Number of attempts at approach: 1       Number of other approaches attempted: 0       Ease of procedure: easy       Dentition: Intact and Unchanged       Dental guard used and removed. Dental Guard Type: Proguard Red.    Medication(s) Administered   Medication Administration Time: 11/1/2022 7:41 AM

## 2022-11-01 NOTE — ANESTHESIA POSTPROCEDURE EVALUATION
Patient: Catalina Foster    Procedure: Procedure(s):  TONSILLECTOMY, BILATERAL        Anesthesia Type:  General    Note:  Disposition: Outpatient   Postop Pain Control: Uneventful            Sign Out: Well controlled pain   PONV: No   Neuro/Psych: Uneventful            Sign Out: Acceptable/Baseline neuro status   Airway/Respiratory: Uneventful            Sign Out: Acceptable/Baseline resp. status   CV/Hemodynamics: Uneventful            Sign Out: Acceptable CV status; No obvious hypovolemia; No obvious fluid overload   Other NRE: NONE   DID A NON-ROUTINE EVENT OCCUR? No           Last vitals:  Vitals Value Taken Time   /65 11/01/22 0850   Temp 36.4  C (97.5  F) 11/01/22 0850   Pulse 73 11/01/22 0854   Resp 16 11/01/22 0853   SpO2 96 % 11/01/22 0854   Vitals shown include unvalidated device data.    Electronically Signed By: Priyank Jay MD  November 1, 2022  9:39 AM

## 2022-11-01 NOTE — OP NOTE
Otolaryngology Full Operative Report     Date of Operation:  11/1/22     Pre-operative Diagnosis:  Chronic tonsillitis  Post-operative Diagnosis:  Same  Procedure(s):  Tonsillectomy     Surgeon: Deepali Enriquez MD  Assistant(s):  none  Anesthesia:  GET     Indications for Procedure:  Catalina is a 32yo F with chronic tonsillitis. The risks and the benefits of the procedure were discussed with the patient. A consent form was then signed and placed into the chart.     Procedure in Detail:  The patient was brought into the room and placed on the operating room table in a supine position. Anesthesia intubated the patient without any difficulty. The head of the bed was then turned 90 degrees and the patient was draped in the usual fashion. A time out was then performed with all pertinent personnel present.     A Jaylen-Iker mouth gag was inserted, taking care not to dislodge the endotracheal tube. It was opened to provide visualization of the oropharynx. The palate was then palpated and note to be intact. A red rubber catheter was then inserted through the left nostril to provide retraction of the soft palate. Attention was turned to the left tonsil. A curved tonsil clamp was used to provide medial traction on the tonsil. A Coblator was used to incise the anterior tonsillar pillar. The plane around the tonsillar capsule was identified and dissection in this plane allowed for the tonsil to be removed in its entirety. Hemostasis was maintained throughout with the procedure. Attention was turned toward the contralateral side, and the procedure was carried out in an identical fashion. The oral cavity and oropharynx were irrigated with normal saline.      An adenoid mirror was then placed in the oropharynx to visualize the adenoid pad. There were no significant adenoids to be addressed. The oropharynx was thoroughly irrigated and suctioned. An OG was passed into the stomach and the contents aspirated. The catheters and mouth gag  were then removed and the patient was turned back to anesthesia for emergence.        Findings:  2.5+ tonsils with active cryptitis     Specimen(s):  Bilateral tonsils     EBL:  2cc     Complications:  none     Disposition: The patient was extubated in the operating room and was transferred to  the PACU in stable condition.     Deepali Enriquez MD  [facility]  [phone] (o)

## 2022-11-01 NOTE — ANESTHESIA PREPROCEDURE EVALUATION
Anesthesia Pre-Procedure Evaluation    Patient: Catalina Foster   MRN: [id number] : 1990        Procedure : Procedure(s):  TONSILLECTOMY          Past Medical History:   Diagnosis Date     Obese      Parkinsons disease (H)       Past Surgical History:   Procedure Laterality Date     NASAL SEPTUM SURGERY      2021      Allergies   Allergen Reactions     Clindamycin Hives and Rash     Orally, topical OK  Orally, topical OK        Social History     Tobacco Use     Smoking status: Never     Smokeless tobacco: Never   Substance Use Topics     Alcohol use: Not Currently      Wt Readings from Last 1 Encounters:   10/03/22 95.4 kg (210 lb 6.4 oz)        Anesthesia Evaluation   Pt has had prior anesthetic. Type: General.    No history of anesthetic complications       ROS/MED HX  ENT/Pulmonary:  - neg pulmonary ROS     Neurologic:    (-) no Parkinson's disease   Cardiovascular:  - neg cardiovascular ROS     METS/Exercise Tolerance:     Hematologic:  - neg hematologic  ROS     Musculoskeletal:  - neg musculoskeletal ROS     GI/Hepatic:  - neg GI/hepatic ROS     Renal/Genitourinary:  - neg Renal ROS     Endo:     (+) Obesity,     Psychiatric/Substance Use:  - neg psychiatric ROS     Infectious Disease:  - neg infectious disease ROS     Malignancy:  - neg malignancy ROS     Other:  - neg other ROS          Physical Exam    Airway        Mallampati: II   TM distance: > 3 FB   Neck ROM: full     Respiratory Devices and Support         Dental  no notable dental history         Cardiovascular   cardiovascular exam normal          Pulmonary   pulmonary exam normal                OUTSIDE LABS:  CBC:   Lab Results   Component Value Date    WBC 6.3 10/03/2022    WBC 4.3 2022    HGB 12.0 10/03/2022    HGB 12.2 2022    HCT 35.5 10/03/2022    HCT 36.6 2022     10/03/2022     2022     BMP:   Lab Results   Component Value Date     2022    POTASSIUM 3.9 2022    CHLORIDE 105  04/20/2022    CO2 23 04/20/2022    BUN 8 04/20/2022    CR 0.62 04/20/2022    GLC 89 04/20/2022     COAGS: No results found for: PTT, INR, FIBR  POC:   Lab Results   Component Value Date    HCG Negative 09/15/2022     HEPATIC:   Lab Results   Component Value Date    ALBUMIN 4.1 09/13/2022    PROTTOTAL 6.9 09/13/2022    ALT 43 (H) 09/15/2022    AST 31 09/15/2022    ALKPHOS 65 09/13/2022    BILITOTAL <0.2 09/13/2022     OTHER:   Lab Results   Component Value Date    A1C 5.2 04/20/2022    CHINMAY 9.4 04/20/2022       Anesthesia Plan    ASA Status:  1   NPO Status:  NPO Appropriate    Anesthesia Type: General.     - Airway: ETT   Induction: Intravenous.   Maintenance: Balanced.        Consents    Anesthesia Plan(s) and associated risks, benefits, and realistic alternatives discussed. Questions answered and patient/representative(s) expressed understanding.    - Discussed:     - Discussed with:  Patient      - Extended Intubation/Ventilatory Support Discussed: No.      - Patient is DNR/DNI Status: No    Use of blood products discussed: No .     Postoperative Care    Pain management: IV analgesics, Oral pain medications, Multi-modal analgesia.   PONV prophylaxis: Dexamethasone or Solumedrol, Ondansetron (or other 5HT-3)     Comments:    Other Comments: Decadron Zofran.  Diprivan infusion.  Tylenol.  Ketamine (0.5 mg/kg).            Priyank Jay MD

## 2022-11-01 NOTE — INTERVAL H&P NOTE
"I have reviewed the surgical (or preoperative) H&P that is linked to this encounter, and examined the patient. There are no significant changes    Clinical Conditions Present on Arrival:  Clinically Significant Risk Factors Present on Admission                    # Obesity: Estimated body mass index is 31.84 kg/m  as calculated from the following:    Height as of this encounter: 1.702 m (5' 7\").    Weight as of this encounter: 92.2 kg (203 lb 4.8 oz).       "

## 2022-11-02 ENCOUNTER — TELEPHONE (OUTPATIENT)
Dept: DERMATOLOGY | Facility: CLINIC | Age: 32
End: 2022-11-02

## 2022-11-02 LAB
PATH REPORT.COMMENTS IMP SPEC: NORMAL
PATH REPORT.COMMENTS IMP SPEC: NORMAL
PATH REPORT.FINAL DX SPEC: NORMAL
PATH REPORT.GROSS SPEC: NORMAL
PATH REPORT.MICROSCOPIC SPEC OTHER STN: NORMAL
PATH REPORT.RELEVANT HX SPEC: NORMAL
PHOTO IMAGE: NORMAL

## 2022-11-02 NOTE — TELEPHONE ENCOUNTER
[facility] Call Center    Phone Message    May a detailed message be left on voicemail: yes     Reason for Call: Other: Pt needs to reschedule their 11/8 appt with Dr. Jiménez and reschedule, but next available is in Feb. Please call [phone]. Thanks!     Action Taken: Message routed to:  [facility] DER[facility]    Travel Screening: Not Applicable

## 2022-11-07 ENCOUNTER — TELEPHONE (OUTPATIENT)
Dept: OTOLARYNGOLOGY | Facility: CLINIC | Age: 32
End: 2022-11-07

## 2022-11-07 ENCOUNTER — NURSE TRIAGE (OUTPATIENT)
Dept: NURSING | Facility: CLINIC | Age: 32
End: 2022-11-07

## 2022-11-07 NOTE — TELEPHONE ENCOUNTER
Spoke with Catalina about post surgery pain.  Pt states that her throat hurts to 7/8 out of 10.  She is alternating Tylenol, ibpurophen and tramadol.  When she takes the Tramadol it helps with pain for about 45 min then the pain comes back.  Pt is not coughing up blood clots but is having a little red tinged spit.  Pt is drinking fluids but it hurts to drink.  Pt has not had a BM since before surgery.  I recommend that pt goes and gets a stool softer to help with having a BM as she is taking narcotics.  Let pt know that I can send a message to see about another pain medication and get back in touch with the pt.  She expressed understanding.      [facility]      Marianela Mckeon RN  [facility]  ENT  [facility] [address]  [email or web address]   Office:[phone]  Employed by [facility]

## 2022-11-07 NOTE — TELEPHONE ENCOUNTER
Nurse Triage SBAR    Is this a 2nd Level Triage? YES, LICENSED PRACTITIONER REVIEW IS REQUIRED    Situation: 11/1/2022    [facility]  -Tonsillectomy postoperative pain  Patient crying on phone due to postoperative pain-- Pain on scale today 7-10/10  today is worst day by far.   -Can swallow during 30 minute window when meds most effective  -Prednisone x 1 on Saturday - Take 10 mLs (30 mg) by mouth once for 1 dose On SATURDAY   -Used tramadol/ acetaminophen/ ibuprofen every 6 hours, rotating medications  Magic mouthwash  Twice daily- helps a little with pain.  - ice chips are being utilized    - has had 16 ounces of fluid today   Past pain medications: Oxycodone 5 mg/ acetaminophen 325 without issues    -Overnight she feels like tongue feels swollen  -During night, had difficulty swallowing spit, sat up and drooled x 45 minutes  -She is staying with mom.   - she is breathing easily.  Notes pinkish tinge in spit, no blood clots nor frequent swallow  -No fever, no chills now but  Has had some.    Eating not much-  Some soft foods until today  -No bowel movement, passinggas?  not that she knows.  -No abdominal pain or bloating.  Last BM Prior to surgery 10/31/22    Background: Day 6 bilateral tonsillectomy/ Dr Enriquez  Medications prescribed:   traMADol (ULTRAM) 50 MG tablet 50 tablet 0 11/1/2022 11/11/2022 No   Sig - Route: Take 1 tablet (50 mg) by mouth every 6 hours as needed for severe pain Can increase to 2 tablets per dose IF needed        ibuprofen (ADVIL/MOTRIN) 600 MG tablet 40 tablet 0 11/1/2022   Take 1 tablet (600 mg) by mouth every 6 hours for 10 days  acetaminophen (TYLENOL) 325 MG tablet 80 tablet 0 11/1/2022 11/11/2022 --   Sig - Route: Take 2 tablets (650 mg) by mouth every 6 hours for 10 days - Oral    magic mouthwash suspension, diphenhydrAMINE, lidocaine, aluminum-magnesium & simethicone, (FIRST-MOUTHWASH BLM) compounding kit      Assessment: Pain management needed at POD #6  . Tramadol, ibuprofen/ tylenol being rotated. Today is worst pain she has experienced 7-10/10 ( steroids Saturday 30 mg prednisone)  No BM since surgery, Voiding urine approx 3 x day    Protocol Recommended Disposition: Pain management- consider oxycodone, ? Steroids. If unable to pass gas, increase fluids and manage pain, may need ED.     Recommendation: review and advise  Routed to provider    Does the patient meet one of the following criteria for ADS visit consideration? No    Reason for Disposition    [1] SEVERE pain (e.g., excruciating, pain scale 8-10) AND [2] not controlled with pain medications    Additional Information    Negative: SEVERE difficulty breathing (e.g., struggling for each breath, speaks in single words, stridor)    Negative: SEVERE bleeding (e.g., spitting out, coughing up, or vomiting a LARGE AMOUNT of blood; such as continuous fresh bleeding or large blood clots)    Negative: [1] Bleeding AND [2] fainted or too weak to stand    Negative: Sounds like a life-threatening emergency to the triager    Negative: [1] MODERATE bleeding (e.g., spitting out or coughing up SMALL AMOUNT of fresh blood) AND [2] one or more times (Exception: blood-tinged saliva)    Negative: [1] Vomiting fresh blood or old blood (coffee-ground vomit) AND [2] small amount one or more times    Negative: Difficulty breathing    Negative: Sounds like a serious complication to the triager    Negative: SEVERE PAIN WITH DYSPHAGIA (e.g., can't swallow any liquids, or drooling)    Negative: [1] Drinking very little AND [2] dehydration suspected (e.g., no urine > 12 hours, very dry mouth, very lightheaded)    Negative: Fever > 104 F (40 C)    Negative: MODERATE-SEVERE vomiting (e.g., 3 or more times)    Negative: Vomiting lasts > 12 hours    Negative: [1] Refuses to drink anything AND [2] for > 12 hours    Answer Assessment - Initial Assessment Questions  1. SYMPTOM: Pain  2. ONSET: today ( POD 6) is worst day yet  3. DATE of  "SURGERY: \"When was surgery performed?\"       11/1/22  4. SEVERITY OF BLEEDING:    - MILD (1-3): few specks or FLECKS OF BLOOD IN THE SALIVA, blood-tinged saliva, or small spots on tissue paper.    - 5. SEVERITY OF THROAT PAIN:    - MODERATE (4-7): interferes with eating some solid foods and normal activities (e.g., work or school)     - SEVERE (8-10): excruciating pain, unable to do any normal activities    - SEVERE PAIN WITH DYSPHAGIA (10): can't swallow any liquids, or drooling     6. FEVER: \"Do you have a fever?\" If Yes, ask: \"What is your temperature, how was it measured, and when did it start?\"      None  7. VOMITING: \"Is there any vomiting?\" If yes, ask: \"How many times?\"    No  8. OTHER SYMPTOMS: \"Are there any other symptoms?\"    No BM since 10/31/22    Protocols used: POST-OP TONSIL AND ADENOID SURGERY-A-AH      "

## 2022-11-07 NOTE — TELEPHONE ENCOUNTER
Spoke with Catalina and let her know that per Dr. Colon pt should go into the ER for pain management.  Recommend that pt drink fluids to stay hydrated and to get a stool softer to help with the BM.  Pt expressed understanding.    [facility]      Marianela Mckeon RN  [facility]  ENT  [facility]  [place] [address]  [email or web address]   Office:[phone]  Employed by [employer]

## 2022-11-08 ENCOUNTER — HOSPITAL ENCOUNTER (EMERGENCY)
Facility: CLINIC | Age: 32
Discharge: HOME | End: 2022-11-09
Attending: STUDENT IN AN ORGANIZED HEALTH CARE EDUCATION/TRAINING PROGRAM | Admitting: STUDENT IN AN ORGANIZED HEALTH CARE EDUCATION/TRAINING PROGRAM
Payer: COMMERCIAL

## 2022-11-08 DIAGNOSIS — G89.18 ACUTE POST-OPERATIVE PAIN: ICD-10-CM

## 2022-11-08 LAB
HOLD SPECIMEN: NORMAL

## 2022-11-08 PROCEDURE — 99284 EMERGENCY DEPT VISIT MOD MDM: CPT | Mod: 25 | Performed by: STUDENT IN AN ORGANIZED HEALTH CARE EDUCATION/TRAINING PROGRAM

## 2022-11-08 PROCEDURE — 80048 BASIC METABOLIC PNL TOTAL CA: CPT | Performed by: STUDENT IN AN ORGANIZED HEALTH CARE EDUCATION/TRAINING PROGRAM

## 2022-11-08 PROCEDURE — 36415 COLL VENOUS BLD VENIPUNCTURE: CPT | Performed by: STUDENT IN AN ORGANIZED HEALTH CARE EDUCATION/TRAINING PROGRAM

## 2022-11-08 PROCEDURE — 258N000003 HC RX IP 258 OP 636: Performed by: STUDENT IN AN ORGANIZED HEALTH CARE EDUCATION/TRAINING PROGRAM

## 2022-11-08 PROCEDURE — 96375 TX/PRO/DX INJ NEW DRUG ADDON: CPT | Performed by: STUDENT IN AN ORGANIZED HEALTH CARE EDUCATION/TRAINING PROGRAM

## 2022-11-08 PROCEDURE — 96361 HYDRATE IV INFUSION ADD-ON: CPT | Performed by: STUDENT IN AN ORGANIZED HEALTH CARE EDUCATION/TRAINING PROGRAM

## 2022-11-08 PROCEDURE — 250N000011 HC RX IP 250 OP 636: Performed by: STUDENT IN AN ORGANIZED HEALTH CARE EDUCATION/TRAINING PROGRAM

## 2022-11-08 PROCEDURE — 85025 COMPLETE CBC W/AUTO DIFF WBC: CPT | Performed by: STUDENT IN AN ORGANIZED HEALTH CARE EDUCATION/TRAINING PROGRAM

## 2022-11-08 PROCEDURE — 96374 THER/PROPH/DIAG INJ IV PUSH: CPT | Performed by: STUDENT IN AN ORGANIZED HEALTH CARE EDUCATION/TRAINING PROGRAM

## 2022-11-08 PROCEDURE — 99284 EMERGENCY DEPT VISIT MOD MDM: CPT | Performed by: STUDENT IN AN ORGANIZED HEALTH CARE EDUCATION/TRAINING PROGRAM

## 2022-11-08 RX ORDER — OXYCODONE HYDROCHLORIDE 5 MG/1
5 TABLET ORAL ONCE
Status: COMPLETED | OUTPATIENT
Start: 2022-11-08 | End: 2022-11-09

## 2022-11-08 RX ORDER — KETOROLAC TROMETHAMINE 15 MG/ML
15 INJECTION, SOLUTION INTRAMUSCULAR; INTRAVENOUS ONCE
Status: COMPLETED | OUTPATIENT
Start: 2022-11-08 | End: 2022-11-08

## 2022-11-08 RX ADMIN — SODIUM CHLORIDE 1000 ML: 9 INJECTION, SOLUTION INTRAVENOUS at 22:14

## 2022-11-08 RX ADMIN — KETOROLAC TROMETHAMINE 15 MG: 15 INJECTION, SOLUTION INTRAMUSCULAR; INTRAVENOUS at 22:14

## 2022-11-08 ASSESSMENT — ACTIVITIES OF DAILY LIVING (ADL): ADLS_ACUITY_SCORE: 33

## 2022-11-09 VITALS
BODY MASS INDEX: 31.79 KG/M2 | WEIGHT: 203 LBS | HEART RATE: 77 BPM | RESPIRATION RATE: 18 BRPM | SYSTOLIC BLOOD PRESSURE: 107 MMHG | DIASTOLIC BLOOD PRESSURE: 77 MMHG | TEMPERATURE: 98.2 F | OXYGEN SATURATION: 98 %

## 2022-11-09 LAB
ANION GAP SERPL CALCULATED.3IONS-SCNC: 16 MMOL/L (ref 7–15)
BASOPHILS # BLD AUTO: 0 10E3/UL (ref 0–0.2)
BASOPHILS NFR BLD AUTO: 1 %
BUN SERPL-MCNC: 12 MG/DL (ref 6–20)
CALCIUM SERPL-MCNC: 9.4 MG/DL (ref 8.6–10)
CHLORIDE SERPL-SCNC: 101 MMOL/L (ref 98–107)
CREAT SERPL-MCNC: 0.57 MG/DL (ref 0.51–0.95)
DEPRECATED HCO3 PLAS-SCNC: 19 MMOL/L (ref 22–29)
EOSINOPHIL # BLD AUTO: 0 10E3/UL (ref 0–0.7)
EOSINOPHIL NFR BLD AUTO: 1 %
ERYTHROCYTE [DISTWIDTH] IN BLOOD BY AUTOMATED COUNT: 11.7 % (ref 10–15)
GFR SERPL CREATININE-BSD FRML MDRD: >90 ML/MIN/1.73M2
GLUCOSE SERPL-MCNC: 89 MG/DL (ref 70–99)
HCT VFR BLD AUTO: 39.7 % (ref 35–47)
HGB BLD-MCNC: 13.5 G/DL (ref 11.7–15.7)
IMM GRANULOCYTES # BLD: 0 10E3/UL
IMM GRANULOCYTES NFR BLD: 1 %
LYMPHOCYTES # BLD AUTO: 2 10E3/UL (ref 0.8–5.3)
LYMPHOCYTES NFR BLD AUTO: 38 %
MCH RBC QN AUTO: 30 PG (ref 26.5–33)
MCHC RBC AUTO-ENTMCNC: 34 G/DL (ref 31.5–36.5)
MCV RBC AUTO: 88 FL (ref 78–100)
MONOCYTES # BLD AUTO: 0.4 10E3/UL (ref 0–1.3)
MONOCYTES NFR BLD AUTO: 7 %
NEUTROPHILS # BLD AUTO: 2.8 10E3/UL (ref 1.6–8.3)
NEUTROPHILS NFR BLD AUTO: 52 %
NRBC # BLD AUTO: 0 10E3/UL
NRBC BLD AUTO-RTO: 0 /100
PLATELET # BLD AUTO: 317 10E3/UL (ref 150–450)
POTASSIUM SERPL-SCNC: 3.6 MMOL/L (ref 3.4–5.3)
RBC # BLD AUTO: 4.5 10E6/UL (ref 3.8–5.2)
SODIUM SERPL-SCNC: 136 MMOL/L (ref 136–145)
WBC # BLD AUTO: 5.4 10E3/UL (ref 4–11)

## 2022-11-09 PROCEDURE — 250N000011 HC RX IP 250 OP 636: Performed by: STUDENT IN AN ORGANIZED HEALTH CARE EDUCATION/TRAINING PROGRAM

## 2022-11-09 PROCEDURE — 250N000013 HC RX MED GY IP 250 OP 250 PS 637: Performed by: STUDENT IN AN ORGANIZED HEALTH CARE EDUCATION/TRAINING PROGRAM

## 2022-11-09 RX ORDER — OXYCODONE HYDROCHLORIDE 5 MG/1
5 TABLET ORAL EVERY 6 HOURS PRN
Qty: 8 TABLET | Refills: 0 | Status: SHIPPED | OUTPATIENT
Start: 2022-11-09 | End: 2022-11-12

## 2022-11-09 RX ORDER — ONDANSETRON 2 MG/ML
4 INJECTION INTRAMUSCULAR; INTRAVENOUS ONCE
Status: COMPLETED | OUTPATIENT
Start: 2022-11-09 | End: 2022-11-09

## 2022-11-09 RX ADMIN — ONDANSETRON 4 MG: 2 INJECTION INTRAMUSCULAR; INTRAVENOUS at 01:08

## 2022-11-09 RX ADMIN — OXYCODONE HYDROCHLORIDE 5 MG: 5 TABLET ORAL at 00:29

## 2022-11-09 ASSESSMENT — ACTIVITIES OF DAILY LIVING (ADL)
ADLS_ACUITY_SCORE: 35
ADLS_ACUITY_SCORE: 35

## 2022-11-09 NOTE — ED PROVIDER NOTES
[facility] EMERGENCY DEPARTMENT ([facility])  11/08/22  ED Provider Note  [facility]      History     Chief Complaint   Patient presents with     Post-op Problem     HPI  Catalina Foster is a 31 year old female with a past medical history of chronic tonsillitis s/p tonsillectomy (11/01/22), who presents to the emergency department for evaluation of 1 episode of bloody emesis.  Reports there were clots in her emesis.  She has ongoing pain and swelling in her throat.  Has been using outpatient oral pain medications without good effect.  Also has been having constipation and has not had a bowel movement since her surgery.  She has not had any fevers, chills or other infectious symptoms.  Poor p.o. intake over the past few days.  Does not have any chest pain or difficulty breathing.      Past Medical History  Past Medical History:   Diagnosis Date     Obese      Parkinsons disease (H)      Past Surgical History:   Procedure Laterality Date     NASAL SEPTUM SURGERY      06/2021     TONSILLECTOMY Bilateral 11/1/2022    Procedure: TONSILLECTOMY, BILATERAL ;  Surgeon: Deepali Enriquez MD;  Location: [facility] Main OR     acetaminophen (TYLENOL) 325 MG tablet  etonogestrel (NEXPLANON) 68 MG IMPL  ibuprofen (ADVIL/MOTRIN) 600 MG tablet  ISOtretinoin (ACCUTANE) 40 MG capsule  magic mouthwash suspension, diphenhydrAMINE, lidocaine, aluminum-magnesium & simethicone, (FIRST-MOUTHWASH BLM) compounding kit  Probiotic Product (UP4 PROBIOTICS WOMENS) CAPS  traMADol (ULTRAM) 50 MG tablet  Ulipristal Acetate (LANDEN) 30 MG tablet      Allergies   Allergen Reactions     Clindamycin Hives and Rash     Orally, topical OK  Orally, topical OK       Family History  Family History   Problem Relation Age of Onset     Ovarian cysts Mother      Heart Disease Father      Hypertension Father      Dementia Maternal Grandmother      Alzheimer Disease Maternal Grandmother      Pneumonia Paternal  Grandmother      Social History   Social History     Tobacco Use     Smoking status: Never     Smokeless tobacco: Never   Vaping Use     Vaping Use: Never used   Substance Use Topics     Alcohol use: Not Currently     Drug use: Yes     Types: Marijuana     Comment: occasional      Past medical history, past surgical history, medications, allergies, family history, and social history were reviewed with the patient. No additional pertinent items.       Review of Systems  A complete review of systems was performed with pertinent positives and negatives noted in the HPI, and all other systems negative.    Physical Exam   BP: 135/85  Pulse: 99  Temp: 98.2  F (36.8  C)  Resp: 18  Weight: 92.1 kg (203 lb)  SpO2: 99 %  Physical Exam  General: no acute distress. Appears stated age.   HENT: mucus membranes dry, bilateral tonsillar swelling and erythema with no active bleeding  Eyes: PERRL, normal sclerae  Neck: non-tender, supple  Cardio: Regular rate. Regular rhythm. Extremities well perfused  Resp: Normal work of breathing, normal respiratory rate.  Chest/Back: no visual signs of trauma  Abdomen: no tenderness, non-distended, no rebound, no guarding  Neuro: alert and fully oriented. CN II-XII grossly intact. Grossly normal strength and sensation in all extremities.   MSK: no deformities. Grossly normal ROM in extremities.   Integumentary/Skin: no rash visualized, normal color  Psych: normal affect, normal behavior    ED Course      Procedures                   Results for orders placed or performed during the hospital encounter of 11/08/22   [place] Draw     Status: None (In process)    Narrative    The following orders were created for panel order [place] Draw.  Procedure                               Abnormality         Status                     ---------                               -----------         ------                     Extra Blue Top Tube[[id number]]                                                          Extra Red Top Tube[[id number]]                               Final result               Extra Green Top (Lithium...[[id number]]                      Final result               Extra Purple Top Tube[[id number]]                            Final result                 Please view results for these tests on the individual orders.   Extra Red Top Tube     Status: None   Result Value Ref Range    Hold Specimen JIC    Extra Green Top (Lithium Heparin) Tube     Status: None   Result Value Ref Range    Hold Specimen JIC    Extra Purple Top Tube     Status: None   Result Value Ref Range    Hold Specimen JIC    CBC with platelets differential     Status: None (In process)    Narrative    The following orders were created for panel order CBC with platelets differential.  Procedure                               Abnormality         Status                     ---------                               -----------         ------                     CBC with platelets and d...[[id number]]                      In process                   Please view results for these tests on the individual orders.     Medications   ondansetron (ZOFRAN) injection 4 mg (has no administration in time range)   0.9% sodium chloride BOLUS (0 mLs Intravenous Stopped 11/8/22 2311)   ketorolac (TORADOL) injection 15 mg (15 mg Intravenous Given 11/8/22 2214)   oxyCODONE (ROXICODONE) tablet 5 mg (5 mg Oral Given 11/9/22 0029)        Assessments & Plan (with Medical Decision Making)   Catalina Foster is a 31-year-old female status post tonsillectomy 11/1/2022 who presents to the ED with 1 episode of bloody emesis and ongoing pain and swelling.  Difficulty tolerating p.o.  Appears mildly dry on exam.  No active bleeding at this time.  Consulted ENT, who state this is a normal postoperative course.  Will check CBC and BMP and give 1 L IV fluids.  If able to symptomatically manage and patient tolerates p.o., can discharge home.  If unable to manage her symptoms, will  likely admit for observation for symptom management.    Patient symptomatically improved after fluids and oxycodone.  Will give short course of home oxycodone.  CBC returned and is within normal limits.  There is a delay with your BMP, and will discharge without this.  We will call her if anything is significantly abnormal.  Plan for outpatient follow-up.  Return to the ED with any new or worsening symptoms including bleeding.    I have reviewed the nursing notes. I have reviewed the findings, diagnosis, plan and need for follow up with the patient.    New Prescriptions    No medications on file       Final diagnoses:   Acute post-operative pain       --  Ladonna Suarez MD  [facility] EMERGENCY DEPARTMENT  11/8/2022     Ladonna Suarez MD  Resident  11/09/22 0205

## 2022-11-09 NOTE — DISCHARGE INSTRUCTIONS
You were seen today for pain and bleeding after tonsillectomy.  He did not have any signs of infection.  Please continue to take your outpatient pain medications.  I have prescribed a short course of oxycodone to add to this.  Follow-up with your surgeon as scheduled.  Return to the emergency department with new or worsening symptoms including bleeding.

## 2022-11-10 ENCOUNTER — HOSPITAL ENCOUNTER (EMERGENCY)
Facility: CLINIC | Age: 32
Discharge: HOME | End: 2022-11-10
Attending: EMERGENCY MEDICINE | Admitting: EMERGENCY MEDICINE
Payer: COMMERCIAL

## 2022-11-10 VITALS
TEMPERATURE: 98 F | HEIGHT: 66 IN | SYSTOLIC BLOOD PRESSURE: 127 MMHG | BODY MASS INDEX: 31.02 KG/M2 | HEART RATE: 86 BPM | WEIGHT: 193 LBS | OXYGEN SATURATION: 100 % | DIASTOLIC BLOOD PRESSURE: 62 MMHG | RESPIRATION RATE: 18 BRPM

## 2022-11-10 DIAGNOSIS — G89.18 POSTOPERATIVE PAIN: ICD-10-CM

## 2022-11-10 DIAGNOSIS — E86.0 DEHYDRATION: ICD-10-CM

## 2022-11-10 DIAGNOSIS — R11.2 NAUSEA AND VOMITING, UNSPECIFIED VOMITING TYPE: ICD-10-CM

## 2022-11-10 LAB
ALBUMIN SERPL BCG-MCNC: 3.9 G/DL (ref 3.5–5.2)
ALBUMIN SERPL BCG-MCNC: 4.4 G/DL (ref 3.5–5.2)
ALP SERPL-CCNC: 48 U/L (ref 35–104)
ALP SERPL-CCNC: 59 U/L (ref 35–104)
ALT SERPL W P-5'-P-CCNC: 105 U/L (ref 10–35)
ALT SERPL W P-5'-P-CCNC: 89 U/L (ref 10–35)
ANION GAP SERPL CALCULATED.3IONS-SCNC: 12 MMOL/L (ref 7–15)
ANION GAP SERPL CALCULATED.3IONS-SCNC: 18 MMOL/L (ref 7–15)
AST SERPL W P-5'-P-CCNC: 53 U/L (ref 10–35)
AST SERPL W P-5'-P-CCNC: 64 U/L (ref 10–35)
BASOPHILS # BLD AUTO: 0 10E3/UL (ref 0–0.2)
BASOPHILS NFR BLD AUTO: 0 %
BILIRUB SERPL-MCNC: 0.5 MG/DL
BILIRUB SERPL-MCNC: 0.6 MG/DL
BUN SERPL-MCNC: 10.9 MG/DL (ref 6–20)
BUN SERPL-MCNC: 9.6 MG/DL (ref 6–20)
CALCIUM SERPL-MCNC: 8.3 MG/DL (ref 8.6–10)
CALCIUM SERPL-MCNC: 8.9 MG/DL (ref 8.6–10)
CHLORIDE SERPL-SCNC: 102 MMOL/L (ref 98–107)
CHLORIDE SERPL-SCNC: 107 MMOL/L (ref 98–107)
CREAT SERPL-MCNC: 0.45 MG/DL (ref 0.51–0.95)
CREAT SERPL-MCNC: 0.49 MG/DL (ref 0.51–0.95)
DEPRECATED HCO3 PLAS-SCNC: 14 MMOL/L (ref 22–29)
DEPRECATED HCO3 PLAS-SCNC: 14 MMOL/L (ref 22–29)
EOSINOPHIL # BLD AUTO: 0 10E3/UL (ref 0–0.7)
EOSINOPHIL NFR BLD AUTO: 0 %
ERYTHROCYTE [DISTWIDTH] IN BLOOD BY AUTOMATED COUNT: 11.6 % (ref 10–15)
GFR SERPL CREATININE-BSD FRML MDRD: >90 ML/MIN/1.73M2
GFR SERPL CREATININE-BSD FRML MDRD: >90 ML/MIN/1.73M2
GLUCOSE SERPL-MCNC: 83 MG/DL (ref 70–99)
GLUCOSE SERPL-MCNC: 88 MG/DL (ref 70–99)
HCT VFR BLD AUTO: 37.8 % (ref 35–47)
HGB BLD-MCNC: 13 G/DL (ref 11.7–15.7)
IMM GRANULOCYTES # BLD: 0 10E3/UL
IMM GRANULOCYTES NFR BLD: 0 %
LIPASE SERPL-CCNC: 24 U/L (ref 13–60)
LYMPHOCYTES # BLD AUTO: 1.9 10E3/UL (ref 0.8–5.3)
LYMPHOCYTES NFR BLD AUTO: 32 %
MAGNESIUM SERPL-MCNC: 2 MG/DL (ref 1.7–2.3)
MCH RBC QN AUTO: 30.2 PG (ref 26.5–33)
MCHC RBC AUTO-ENTMCNC: 34.4 G/DL (ref 31.5–36.5)
MCV RBC AUTO: 88 FL (ref 78–100)
MONOCYTES # BLD AUTO: 0.4 10E3/UL (ref 0–1.3)
MONOCYTES NFR BLD AUTO: 7 %
NEUTROPHILS # BLD AUTO: 3.6 10E3/UL (ref 1.6–8.3)
NEUTROPHILS NFR BLD AUTO: 61 %
NRBC # BLD AUTO: 0 10E3/UL
NRBC BLD AUTO-RTO: 0 /100
PLATELET # BLD AUTO: 311 10E3/UL (ref 150–450)
POTASSIUM SERPL-SCNC: 3.7 MMOL/L (ref 3.4–5.3)
POTASSIUM SERPL-SCNC: 4.1 MMOL/L (ref 3.4–5.3)
PROT SERPL-MCNC: 6.5 G/DL (ref 6.4–8.3)
PROT SERPL-MCNC: 7.4 G/DL (ref 6.4–8.3)
RBC # BLD AUTO: 4.31 10E6/UL (ref 3.8–5.2)
SODIUM SERPL-SCNC: 133 MMOL/L (ref 136–145)
SODIUM SERPL-SCNC: 134 MMOL/L (ref 136–145)
WBC # BLD AUTO: 5.9 10E3/UL (ref 4–11)

## 2022-11-10 PROCEDURE — 99284 EMERGENCY DEPT VISIT MOD MDM: CPT | Mod: 25 | Performed by: EMERGENCY MEDICINE

## 2022-11-10 PROCEDURE — 83735 ASSAY OF MAGNESIUM: CPT | Performed by: EMERGENCY MEDICINE

## 2022-11-10 PROCEDURE — 250N000011 HC RX IP 250 OP 636: Performed by: EMERGENCY MEDICINE

## 2022-11-10 PROCEDURE — 96361 HYDRATE IV INFUSION ADD-ON: CPT | Performed by: EMERGENCY MEDICINE

## 2022-11-10 PROCEDURE — 96374 THER/PROPH/DIAG INJ IV PUSH: CPT | Performed by: EMERGENCY MEDICINE

## 2022-11-10 PROCEDURE — 84155 ASSAY OF PROTEIN SERUM: CPT | Performed by: EMERGENCY MEDICINE

## 2022-11-10 PROCEDURE — 258N000003 HC RX IP 258 OP 636: Performed by: EMERGENCY MEDICINE

## 2022-11-10 PROCEDURE — 96375 TX/PRO/DX INJ NEW DRUG ADDON: CPT | Performed by: EMERGENCY MEDICINE

## 2022-11-10 PROCEDURE — 85025 COMPLETE CBC W/AUTO DIFF WBC: CPT | Performed by: EMERGENCY MEDICINE

## 2022-11-10 PROCEDURE — 99284 EMERGENCY DEPT VISIT MOD MDM: CPT | Performed by: EMERGENCY MEDICINE

## 2022-11-10 PROCEDURE — 83690 ASSAY OF LIPASE: CPT | Performed by: EMERGENCY MEDICINE

## 2022-11-10 PROCEDURE — 36415 COLL VENOUS BLD VENIPUNCTURE: CPT | Performed by: EMERGENCY MEDICINE

## 2022-11-10 RX ORDER — ERGOCALCIFEROL 1.25 MG/1
1 CAPSULE ORAL WEEKLY
COMMUNITY
Start: 2021-06-04 | End: 2022-11-22

## 2022-11-10 RX ORDER — METOCLOPRAMIDE 10 MG/1
10 TABLET ORAL
Qty: 20 TABLET | Refills: 0 | Status: SHIPPED | OUTPATIENT
Start: 2022-11-10 | End: 2022-11-22

## 2022-11-10 RX ORDER — METOCLOPRAMIDE HYDROCHLORIDE 5 MG/ML
10 INJECTION INTRAMUSCULAR; INTRAVENOUS ONCE
Status: COMPLETED | OUTPATIENT
Start: 2022-11-10 | End: 2022-11-10

## 2022-11-10 RX ORDER — ONDANSETRON 2 MG/ML
8 INJECTION INTRAMUSCULAR; INTRAVENOUS ONCE
Status: COMPLETED | OUTPATIENT
Start: 2022-11-10 | End: 2022-11-10

## 2022-11-10 RX ADMIN — METOCLOPRAMIDE 10 MG: 5 INJECTION, SOLUTION INTRAMUSCULAR; INTRAVENOUS at 06:43

## 2022-11-10 RX ADMIN — SODIUM CHLORIDE 1000 ML: 9 INJECTION, SOLUTION INTRAVENOUS at 06:25

## 2022-11-10 RX ADMIN — SODIUM CHLORIDE 1000 ML: 9 INJECTION, SOLUTION INTRAVENOUS at 03:47

## 2022-11-10 RX ADMIN — SODIUM CHLORIDE 1000 ML: 9 INJECTION, SOLUTION INTRAVENOUS at 02:50

## 2022-11-10 RX ADMIN — ONDANSETRON 8 MG: 2 INJECTION INTRAMUSCULAR; INTRAVENOUS at 02:50

## 2022-11-10 ASSESSMENT — ACTIVITIES OF DAILY LIVING (ADL)
ADLS_ACUITY_SCORE: 35
ADLS_ACUITY_SCORE: 33
ADLS_ACUITY_SCORE: 35

## 2022-11-10 NOTE — DISCHARGE INSTRUCTIONS
Use the nausea medication as prescribed. Drink plenty of fluids. Return with any worsening or concerns. Follow up with your clinic doctor tomorrow or Monday for a recheck.

## 2022-11-10 NOTE — ED TRIAGE NOTES
Had her tonsils removed on the 1st.  She was seen yesterday and returns today d/t the pain and nausea.  She feels as if she is dehydrated

## 2022-11-10 NOTE — ED PROVIDER NOTES
ED Provider Note  [facility]      History     Chief Complaint   Patient presents with     Nausea     Post-op Problem     HPI  Catalina Foster is a 31 year old female who presents with nausea and vomiting.  She had a tonsillectomy performed on 11/1/2022 secondary to chronic tonsillitis.  She was subsequently seen in the ED on 11/8/2022 with pain and postoperative bleeding as well as vomiting.  She had improvement with oxycodone and fluids, discharged with a small prescription for oxycodone.  She states that the bleeding is improved, only a small amount of bleeding today.  Also states the pain is starting to improve.  However, she was unable to keep her pain meds down today.  She states she vomited approximately 5 episodes today, mostly nonbloody.  She did pass 2 normal stools today.  No abdominal pain.  No fever, cough, shortness of breath.  She states that she has been vomiting so much and not able to keep down p.o. that she started to feel lightheaded at times.  She also states she was having some cramping in her legs.    Past Medical History  Past Medical History:   Diagnosis Date     Obese      Parkinsons disease (H)      Past Surgical History:   Procedure Laterality Date     NASAL SEPTUM SURGERY      06/2021     TONSILLECTOMY Bilateral 11/1/2022    Procedure: TONSILLECTOMY, BILATERAL ;  Surgeon: Deepali Enriquez MD;  Location: [facility] OR     ergocalciferol (ERGOCALCIFEROL) 1.25 MG (57288 UT) capsule  metoclopramide (REGLAN) 10 MG tablet  acetaminophen (TYLENOL) 325 MG tablet  etonogestrel (NEXPLANON) 68 MG IMPL  ibuprofen (ADVIL/MOTRIN) 600 MG tablet  ISOtretinoin (ACCUTANE) 40 MG capsule  magic mouthwash suspension, diphenhydrAMINE, lidocaine, aluminum-magnesium & simethicone, (FIRST-MOUTHWASH BLM) compounding kit  oxyCODONE (ROXICODONE) 5 MG tablet  Probiotic Product (UP4 PROBIOTICS WOMENS) CAPS  traMADol (ULTRAM) 50 MG tablet  Ulipristal Acetate (LANDEN) 30 MG  "tablet      Allergies   Allergen Reactions     Clindamycin Hives and Rash     Orally, topical OK  Orally, topical OK       Family History  Family History   Problem Relation Age of Onset     Ovarian cysts Mother      Heart Disease Father      Hypertension Father      Dementia Maternal Grandmother      Alzheimer Disease Maternal Grandmother      Pneumonia Paternal Grandmother      Social History   Social History     Tobacco Use     Smoking status: Never     Smokeless tobacco: Never   Vaping Use     Vaping Use: Never used   Substance Use Topics     Alcohol use: Not Currently     Drug use: Yes     Types: Marijuana     Comment: occasional      Past medical history, past surgical history, medications, allergies, family history, and social history were reviewed with the patient. No additional pertinent items.       Review of Systems  A complete review of systems was performed with pertinent positives and negatives noted in the HPI, and all other systems negative.    Physical Exam   BP: 107/79  Pulse: 104  Temp: 98  F (36.7  C)  Resp: 20  Height: 167.6 cm (5' 6\")  Weight: 87.5 kg (193 lb)  SpO2: 98 %  Physical Exam  Constitutional:       General: She is not in acute distress.     Appearance: She is not diaphoretic.   HENT:      Head: Atraumatic.      Mouth/Throat:      Comments: Granulation tissue noted in the bilateral tonsillar fossa  Eyes:      General: No scleral icterus.  Cardiovascular:      Heart sounds: Normal heart sounds.   Pulmonary:      Effort: No respiratory distress.      Breath sounds: Normal breath sounds.   Abdominal:      Palpations: Abdomen is soft.      Tenderness: There is no abdominal tenderness.   Musculoskeletal:         General: No tenderness.   Skin:     General: Skin is warm.      Findings: No rash.         ED Course      Procedures                 Results for orders placed or performed during the hospital encounter of 11/10/22   Comprehensive metabolic panel     Status: Abnormal   Result Value " Ref Range    Sodium 134 (L) 136 - 145 mmol/L    Potassium 3.7 3.4 - 5.3 mmol/L    Chloride 102 98 - 107 mmol/L    Carbon Dioxide (CO2) 14 (L) 22 - 29 mmol/L    Anion Gap 18 (H) 7 - 15 mmol/L    Urea Nitrogen 9.6 6.0 - 20.0 mg/dL    Creatinine 0.45 (L) 0.51 - 0.95 mg/dL    Calcium 8.9 8.6 - 10.0 mg/dL    Glucose 88 70 - 99 mg/dL    Alkaline Phosphatase 59 35 - 104 U/L    AST 64 (H) 10 - 35 U/L     (H) 10 - 35 U/L    Protein Total 7.4 6.4 - 8.3 g/dL    Albumin 4.4 3.5 - 5.2 g/dL    Bilirubin Total 0.6 <=1.2 mg/dL    GFR Estimate >90 >60 mL/min/1.73m2   Lipase     Status: Normal   Result Value Ref Range    Lipase 24 13 - 60 U/L   Magnesium     Status: Normal   Result Value Ref Range    Magnesium 2.0 1.7 - 2.3 mg/dL   CBC with platelets and differential     Status: None   Result Value Ref Range    WBC Count 5.9 4.0 - 11.0 10e3/uL    RBC Count 4.31 3.80 - 5.20 10e6/uL    Hemoglobin 13.0 11.7 - 15.7 g/dL    Hematocrit 37.8 35.0 - 47.0 %    MCV 88 78 - 100 fL    MCH 30.2 26.5 - 33.0 pg    MCHC 34.4 31.5 - 36.5 g/dL    RDW 11.6 10.0 - 15.0 %    Platelet Count 311 150 - 450 10e3/uL    % Neutrophils 61 %    % Lymphocytes 32 %    % Monocytes 7 %    % Eosinophils 0 %    % Basophils 0 %    % Immature Granulocytes 0 %    NRBCs per 100 WBC 0 <1 /100    Absolute Neutrophils 3.6 1.6 - 8.3 10e3/uL    Absolute Lymphocytes 1.9 0.8 - 5.3 10e3/uL    Absolute Monocytes 0.4 0.0 - 1.3 10e3/uL    Absolute Eosinophils 0.0 0.0 - 0.7 10e3/uL    Absolute Basophils 0.0 0.0 - 0.2 10e3/uL    Absolute Immature Granulocytes 0.0 <=0.4 10e3/uL    Absolute NRBCs 0.0 10e3/uL   Comprehensive metabolic panel     Status: Abnormal   Result Value Ref Range    Sodium 133 (L) 136 - 145 mmol/L    Potassium 4.1 3.4 - 5.3 mmol/L    Chloride 107 98 - 107 mmol/L    Carbon Dioxide (CO2) 14 (L) 22 - 29 mmol/L    Anion Gap 12 7 - 15 mmol/L    Urea Nitrogen 10.9 6.0 - 20.0 mg/dL    Creatinine 0.49 (L) 0.51 - 0.95 mg/dL    Calcium 8.3 (L) 8.6 - 10.0 mg/dL     Glucose 83 70 - 99 mg/dL    Alkaline Phosphatase 48 35 - 104 U/L    AST 53 (H) 10 - 35 U/L    ALT 89 (H) 10 - 35 U/L    Protein Total 6.5 6.4 - 8.3 g/dL    Albumin 3.9 3.5 - 5.2 g/dL    Bilirubin Total 0.5 <=1.2 mg/dL    GFR Estimate >90 >60 mL/min/1.73m2   CBC with platelets differential     Status: None    Narrative    The following orders were created for panel order CBC with platelets differential.  Procedure                               Abnormality         Status                     ---------                               -----------         ------                     CBC with platelets and d...[[id number]]                      Final result                 Please view results for these tests on the individual orders.     Medications   0.9% sodium chloride BOLUS (0 mLs Intravenous Stopped 11/10/22 0421)   ondansetron (ZOFRAN) injection 8 mg (8 mg Intravenous Given 11/10/22 0250)   0.9% sodium chloride BOLUS (0 mLs Intravenous Stopped 11/10/22 0747)   0.9% sodium chloride BOLUS (0 mLs Intravenous Stopped 11/10/22 0747)   metoclopramide (REGLAN) injection 10 mg (10 mg Intravenous Given 11/10/22 0643)        Assessments & Plan (with Medical Decision Making)   Patient was treated symptomatically with antiemetics, fluids.  She reports feeling dramatically improved.  She is able to tolerate p.o. challenge.  Bicarb is low, initially anion gap was high.  Anion gap now is resolved but bicarb remains low.  Is unclear if this is residual from dehydration, could be related to something else.  LFTs are very mildly elevated, though she has no tenderness initially, no tenderness with repeat exam.  She states she has had mildly elevated LFTs in the past.  Given the completely benign abdominal exam, my suspicion for acute biliary or hepatic issues the cause for symptoms is fairly low.  I did discuss with the patient that as her labs are still off a bit, this is her second visit, the safest thing would be for admission  observation for symptom control and further monitoring.  However, after further discussion, she prefers to discharge home.  Light diet and oral hydration were discussed.  I will give an additional prescription for Reglan.  She does have Zofran at home.  She is encouraged to follow-up with her primary care provider either tomorrow or Monday for recheck and potentially repeat labs.  She is encouraged to return to the ER with any new or worsening symptoms, any other concerns.  No clear evidence to suggest infectious or acute surgical etiology at this time.    Dictation Disclaimer: Some of this Note has been completed with voice-recognition dictation software. Although errors are generally corrected real-time, there is the potential for a rare error to be present in the completed chart.      I have reviewed the nursing notes. I have reviewed the findings, diagnosis, plan and need for follow up with the patient.    New Prescriptions    METOCLOPRAMIDE (REGLAN) 10 MG TABLET    Take 1 tablet (10 mg) by mouth 4 times daily (before meals and nightly)       Final diagnoses:   Nausea and vomiting, unspecified vomiting type   Dehydration   Postoperative pain       --    [facility] EMERGENCY DEPARTMENT  11/10/2022     Hilary Tyler MD  11/10/22 6554

## 2022-11-21 NOTE — PROGRESS NOTES
[facility] Dermatology Note  Encounter Date: Nov 22, 2022  Telephone ([phone]). Location of teledermatologist: [facility] DERMATOLOGY [facility]INIC [place]. Start time: 0821. End time: 0829.    Dermatology Problem List:  1. Acne vulgaris   - Current treatment: isotretinoin 40 mg daily started 8/12/2022, increased to 40 mg BID 9/13/2022  - Prior tx: clindamycin 1% lotion, Retin-A 0.025%, spironolactone, doxycycline   ____________________________________________    Assessment & Plan:     # Acne vulgaris - chronic, active.  Tolerating side effects from treatment well.   - Continue isotretinoin 40 mg BID as below  - Cumulative dose: 6000 mg   - Goal cumulative dose of 13,050 mg - 19,140 mg (150-220 mg/kg in this 89 kg patient)   - IPledge Category: Female of Reproductive Potential (FRP)   - Primary Form of Contraception (if applicable): Nexplanon   - Secondary Form of Contraception (if applicable): Condoms   - Continue gentle cleanser, moisturization  - Sent same photo of home UPT - reminded to send new photo with date each month  - Not donating blood, sharing medication with others.  - Labs ordered today, to be done at her earliest convenience - if normal, will not need to repeat    Procedures Performed:    None    Follow-up: 1 month(s) virtually (telephone with photos), or earlier for new or changing lesions    Staff and Scribe:      Scribe Disclosure:  I, SILVERIO CUNNINGHAM, am serving as a scribe to document services personally performed by Chuckie Hernandes MD based on data collection and the provider's statements to me.     Staff attestation:  The documentation recorded by the scribe accurately reflects the services I personally performed and the decisions I personally made. I have made edits where needed.    Chuckie Hernandes MD  Staff Dermatologist and Dermatopathologist  , Department of Dermatology   ____________________________________________    CC: Accutane (Things  have been going well.  Skin is getting consistently drier.  )    HPI:  Ms. Catalina Foster is a(n) 32 year old female who presents today as a return patient for Accutane follow up. Last seen by myself virtually on 10/25/22, at which time the patient was continued on 40 mg bid of isotretinoin for treatment of acne vulgaris.    Reduced number of new lesions. Face (lips, forehead) is more consistently dry.    Status post tonsillectomy. Knees have been sore.     Mood is stable. Having some constipation.     Patient is otherwise feeling well, without additional skin concerns.    Labs Reviewed:  CBC, lipids, UPT    Physical Exam:  Vitals: There were no vitals taken for this visit.  SKIN: Teledermatology photos were reviewed; image quality and interpretability: acceptable. Image date: 11/22/22.  - Face clear with mild xerosis  - No other lesions of concern on areas examined.     Medications:  Current Outpatient Medications   Medication     etonogestrel (NEXPLANON) 68 MG IMPL     ISOtretinoin (ACCUTANE) 40 MG capsule     Probiotic Product (UP4 PROBIOTICS WOMENS) CAPS     Ulipristal Acetate (LANDEN) 30 MG tablet     No current facility-administered medications for this visit.      Past Medical/Surgical History:   Patient Active Problem List   Diagnosis     Acne vulgaris     Acute medial meniscal injury of knee, right, subsequent encounter     Screening for malignant neoplasm of cervix     Nexplanon in place     Past Medical History:   Diagnosis Date     Obese      Parkinsons disease (H)        CC No referring provider defined for this encounter. on close of this encounter.

## 2022-11-22 ENCOUNTER — VIRTUAL VISIT (OUTPATIENT)
Dept: DERMATOLOGY | Facility: CLINIC | Age: 32
End: 2022-11-22
Payer: COMMERCIAL

## 2022-11-22 ENCOUNTER — TELEPHONE (OUTPATIENT)
Dept: DERMATOLOGY | Facility: CLINIC | Age: 32
End: 2022-11-22

## 2022-11-22 DIAGNOSIS — L70.0 ACNE VULGARIS: Primary | ICD-10-CM

## 2022-11-22 PROCEDURE — 99214 OFFICE O/P EST MOD 30 MIN: CPT | Mod: 95 | Performed by: DERMATOLOGY

## 2022-11-22 ASSESSMENT — PAIN SCALES - GENERAL: PAINLEVEL: NO PAIN (0)

## 2022-11-22 NOTE — NURSING NOTE
Dermatology Rooming Note    Catalina Foster's goals for this visit include:   Chief Complaint   Patient presents with     Accutane     Things have been going well.  Skin is getting consistently drier.       Aleyda Cornelius, CMA

## 2022-11-22 NOTE — LETTER
11/22/2022       RE: Catlaina Foster  [address]  [facility] [address]     Dear Colleague,    Thank you for referring your patient, Catalina Foster, to the [facility] DERMATOLOGY CLINIC [place] at [facility]. Please see a copy of my visit note below.    [facility] Dermatology Note  Encounter Date: Nov 22, 2022  Telephone ([phone]). Location of teledermatologist: [facility] DERMATOLOGY CLINIC [place]. Start time: 0821. End time: 0829.    Dermatology Problem List:  1. Acne vulgaris   - Current treatment: isotretinoin 40 mg daily started 8/12/2022, increased to 40 mg BID 9/13/2022  - Prior tx: clindamycin 1% lotion, Retin-A 0.025%, spironolactone, doxycycline   ____________________________________________    Assessment & Plan:     # Acne vulgaris - chronic, active.  Tolerating side effects from treatment well.   - Continue isotretinoin 40 mg BID as below  - Cumulative dose: 6000 mg   - Goal cumulative dose of 13,050 mg - 19,140 mg (150-220 mg/kg in this 89 kg patient)   - IPledge Category: Female of Reproductive Potential (FRP)   - Primary Form of Contraception (if applicable): Nexplanon   - Secondary Form of Contraception (if applicable): Condoms   - Continue gentle cleanser, moisturization  - Sent same photo of home UPT - reminded to send new photo with date each month  - Not donating blood, sharing medication with others.  - Labs ordered today, to be done at her earliest convenience - if normal, will not need to repeat    Procedures Performed:    None    Follow-up: 1 month(s) virtually (telephone with photos), or earlier for new or changing lesions    Staff and Scribe:      Scribe Disclosure:  SILVERIO RAO, am serving as a scribe to document services personally performed by Chuckie Hernandes MD based on data collection and the provider's statements to me.     Staff attestation:  The documentation recorded by the  shaina accurately reflects the services I personally performed and the decisions I personally made. I have made edits where needed.    Chuckie Hernandes MD  Staff Dermatologist and Dermatopathologist  , Department of Dermatology   ____________________________________________    CC: Accutane (Things have been going well.  Skin is getting consistently drier.  )    HPI:  Ms. Catalina Foster is a(n) 32 year old female who presents today as a return patient for Accutane follow up. Last seen by myself virtually on 10/25/22, at which time the patient was continued on 40 mg bid of isotretinoin for treatment of acne vulgaris.    Reduced number of new lesions. Face (lips, forehead) is more consistently dry.    Status post tonsillectomy. Knees have been sore.     Mood is stable. Having some constipation.     Patient is otherwise feeling well, without additional skin concerns.    Labs Reviewed:  CBC, lipids, UPT    Physical Exam:  Vitals: There were no vitals taken for this visit.  SKIN: Teledermatology photos were reviewed; image quality and interpretability: acceptable. Image date: 11/22/22.  - Face clear with mild xerosis  - No other lesions of concern on areas examined.     Medications:  Current Outpatient Medications   Medication     etonogestrel (NEXPLANON) 68 MG IMPL     ISOtretinoin (ACCUTANE) 40 MG capsule     Probiotic Product (UP4 PROBIOTICS WOMENS) CAPS     Ulipristal Acetate (LANDEN) 30 MG tablet     No current facility-administered medications for this visit.      Past Medical/Surgical History:   Patient Active Problem List   Diagnosis     Acne vulgaris     Acute medial meniscal injury of knee, right, subsequent encounter     Screening for malignant neoplasm of cervix     Nexplanon in place     Past Medical History:   Diagnosis Date     Obese      Parkinsons disease (H)        CC No referring provider defined for this encounter. on close of this encounter.

## 2022-11-22 NOTE — TELEPHONE ENCOUNTER
Informed the patient that we are not able to confirm her in iPledge at this time due to her being 3 days early. Verbal understanding.    Patient needs to be confirmed in iPledge on Friday 11/25    Margie Cotto CMA

## 2022-11-25 ENCOUNTER — MYC MEDICAL ADVICE (OUTPATIENT)
Dept: DERMATOLOGY | Facility: CLINIC | Age: 32
End: 2022-11-25

## 2022-11-25 DIAGNOSIS — L70.0 CYSTIC ACNE: ICD-10-CM

## 2022-11-25 NOTE — TELEPHONE ENCOUNTER
Patient has not sent in pregnancy test to be confirmed in ipledge as of 1:50pm on 11/25/2022. Mychart sent to patient requesting she do so. See mychart encounter from 11/25/2022 for more information.    Yann Garcia, EMT

## 2022-11-29 ENCOUNTER — LAB (OUTPATIENT)
Dept: LAB | Facility: CLINIC | Age: 32
End: 2022-11-29
Payer: COMMERCIAL

## 2022-11-29 ENCOUNTER — TELEPHONE (OUTPATIENT)
Dept: OTOLARYNGOLOGY | Facility: CLINIC | Age: 32
End: 2022-11-29

## 2022-11-29 ENCOUNTER — OFFICE VISIT (OUTPATIENT)
Dept: OTOLARYNGOLOGY | Facility: CLINIC | Age: 32
End: 2022-11-29
Payer: COMMERCIAL

## 2022-11-29 DIAGNOSIS — J35.01 CHRONIC TONSILLITIS: Primary | ICD-10-CM

## 2022-11-29 DIAGNOSIS — L70.0 ACNE VULGARIS: ICD-10-CM

## 2022-11-29 DIAGNOSIS — J35.8 CALCULUS OF TONSIL: ICD-10-CM

## 2022-11-29 LAB
BASOPHILS # BLD AUTO: 0 10E3/UL (ref 0–0.2)
BASOPHILS NFR BLD AUTO: 0 %
CHOLEST SERPL-MCNC: 196 MG/DL
EOSINOPHIL # BLD AUTO: 0.1 10E3/UL (ref 0–0.7)
EOSINOPHIL NFR BLD AUTO: 1 %
ERYTHROCYTE [DISTWIDTH] IN BLOOD BY AUTOMATED COUNT: 12.5 % (ref 10–15)
HCT VFR BLD AUTO: 35.2 % (ref 35–47)
HDLC SERPL-MCNC: 47 MG/DL
HGB BLD-MCNC: 11.8 G/DL (ref 11.7–15.7)
IMM GRANULOCYTES # BLD: 0 10E3/UL
IMM GRANULOCYTES NFR BLD: 0 %
LDLC SERPL CALC-MCNC: 88 MG/DL
LYMPHOCYTES # BLD AUTO: 2.2 10E3/UL (ref 0.8–5.3)
LYMPHOCYTES NFR BLD AUTO: 33 %
MCH RBC QN AUTO: 30.1 PG (ref 26.5–33)
MCHC RBC AUTO-ENTMCNC: 33.5 G/DL (ref 31.5–36.5)
MCV RBC AUTO: 90 FL (ref 78–100)
MONOCYTES # BLD AUTO: 0.4 10E3/UL (ref 0–1.3)
MONOCYTES NFR BLD AUTO: 7 %
NEUTROPHILS # BLD AUTO: 3.9 10E3/UL (ref 1.6–8.3)
NEUTROPHILS NFR BLD AUTO: 59 %
NONHDLC SERPL-MCNC: 149 MG/DL
PLATELET # BLD AUTO: 288 10E3/UL (ref 150–450)
RBC # BLD AUTO: 3.92 10E6/UL (ref 3.8–5.2)
TRIGL SERPL-MCNC: 303 MG/DL
WBC # BLD AUTO: 6.6 10E3/UL (ref 4–11)

## 2022-11-29 PROCEDURE — 80061 LIPID PANEL: CPT

## 2022-11-29 PROCEDURE — 36415 COLL VENOUS BLD VENIPUNCTURE: CPT

## 2022-11-29 PROCEDURE — 85025 COMPLETE CBC W/AUTO DIFF WBC: CPT

## 2022-11-29 PROCEDURE — 99024 POSTOP FOLLOW-UP VISIT: CPT | Performed by: OTOLARYNGOLOGY

## 2022-11-29 NOTE — TELEPHONE ENCOUNTER
[facility] Call Center    Phone Message    May a detailed message be left on voicemail: yes     Reason for Call: Other: Pt calling to RS post-op appt today 11/29/2022, no live answer on [facility] phone.  RS due to weather.  Thank you!     Action Taken: Message routed to:  Other: [facility]    Travel Screening: Not Applicable

## 2022-11-29 NOTE — LETTER
11/29/2022         RE: Catalina Foster  [address]  [facility] [address]        Dear Colleague,    Thank you for referring your patient, Catalina Foster, to the [facility]. Please see a copy of my visit note below.    HPI: This patient is a 31yo F who presents for a post-op visit s/p tonsillectomy. Doing well overall now, though she had a rough recovery in terms of pain control and dehydration issues. Has now returned to normal activity and normal diet. No issues with infections or stones.     PHYSICAL EXAMINATION:  GEN: no acute distress, normocephalic  OC/OP: clear, dentition is appropriate for age. The tongue and palate are fully mobile and symmetric. The tonsillar fossae are well-healed.  NECK: soft and supple. No lymphadenopathy or masses. Airway is midline.  PULM: breathing comfortably on room air, normal chest expansion with respiration    MEDICAL DECISION-MAKING: doing well s/p tonsillectomy. RTC PRN          Again, thank you for allowing me to participate in the care of your patient.        Sincerely,        Deepali Enriquez MD

## 2022-11-30 RX ORDER — ISOTRETINOIN 40 MG/1
40 CAPSULE ORAL 2 TIMES DAILY
Qty: 60 CAPSULE | Refills: 0 | Status: SHIPPED | OUTPATIENT
Start: 2022-11-30 | End: 2023-01-10

## 2022-12-01 NOTE — PROGRESS NOTES
COVID-19 PCR test completed. Patient handout For Patients Who Have Been Tested for Covid-19 (Coronavirus) was given to the patient, which includes test result notification process.     Methotrexate Counseling:  Patient counseled regarding adverse effects of methotrexate including but not limited to nausea, vomiting, abnormalities in liver function tests. Patients may develop mouth sores, rash, diarrhea, and abnormalities in blood counts. The patient understands that monitoring is required including LFT's and blood counts.  There is a rare possibility of scarring of the liver and lung problems that can occur when taking methotrexate. Persistent nausea, loss of appetite, pale stools, dark urine, cough, and shortness of breath should be reported immediately. Patient advised to discontinue methotrexate treatment at least three months before attempting to become pregnant.  I discussed the need for folate supplements while taking methotrexate.  These supplements can decrease side effects during methotrexate treatment. The patient verbalized understanding of the proper use and possible adverse effects of methotrexate.  All of the patient's questions and concerns were addressed.

## 2023-01-06 ENCOUNTER — VIRTUAL VISIT (OUTPATIENT)
Dept: URGENT CARE | Facility: CLINIC | Age: 33
End: 2023-01-06
Payer: COMMERCIAL

## 2023-01-06 DIAGNOSIS — J01.90 ACUTE SINUSITIS WITH SYMPTOMS > 10 DAYS: Primary | ICD-10-CM

## 2023-01-06 PROCEDURE — 99213 OFFICE O/P EST LOW 20 MIN: CPT | Mod: 95

## 2023-01-06 NOTE — PATIENT INSTRUCTIONS
Augmentin (amoxicillin-clavulanate) 875mg twice daily for 7 days as directed.  Take Tylenol or an NSAID such as ibuprofen or naproxen as needed for pain.  May use netti pot with bottled or distilled water and saline packets to flush sinuses.  Sudafed (pseudoephedrine) behind the pharmacist counter for 3-5 days helps relieve congestion.  Mucinex (guiafenesin) thins mucus and may help it to loosen more quickly  Saline drops or nasal sprays may loosen mucus.  Sit in the bathroom with the door closed and hot shower running to loosen mucus.

## 2023-01-06 NOTE — PROGRESS NOTES
Assessment & Plan     Acute sinusitis with symptoms > 10 days  - amoxicillin-clavulanate (AUGMENTIN) 875-125 MG tablet; Take 1 tablet by mouth 2 times daily for 7 days    Symptoms have not improved after greater than 10 days.  Augmentin to treat secondary bacterial sinusitis.  Continue Sudafed and nasal rinse.    Return in about 1 week (around 1/13/2023) for visit with primary care provider if not improving.     Lea Johnson PA-C  [facility]    Subjective   Catalina Foster is a 32 year old who presents for the following health issues    HPI    Catalina presents via video for evaluation of a presumed sinus infection.  Symptoms first began about 2 weeks ago.  She has had facial pain and pressure specifically on the left side. Nasal discharge went from clear to yellow.   She has been using the Nicole pot and Sudafed to help control her symptoms but overall is not improving.    Review of Systems   ROS negative except as stated above.      Objective    Physical Exam   GENERAL: Healthy, alert and no distress  EYES: Eyes grossly normal to inspection.  No discharge or erythema, or obvious scleral/conjunctival abnormalities.  HENT: Normal cephalic/atraumatic.  External ears, nose and mouth without ulcers or lesions.  No nasal drainage visible.  RESP: No audible cough wheeze or visible cyanosis.  No visible retractions or increased work of breathing.    SKIN: Visible skin clear. No significant rash, abnormal pigmentation or lesions.  NEURO: Cranial nerves grossly intact.  Mentation and speech appropriate for age.  PSYCH: Mentation appears normal, affect normal/bright, judgement and insight intact, normal speech and appearance well-groomed.    Type of service:  Video Visit  Video Start Time: 9:28AM  Video End Time: 9:35AM  Originating Location (pt. Location): Home  Distant Location (provider location):  [facility] VIRTUAL [facility]RGENT [facility]ARE- offsite at [facility]  Platform used for Video  Visit: Piedad

## 2023-01-10 ENCOUNTER — VIRTUAL VISIT (OUTPATIENT)
Dept: DERMATOLOGY | Facility: CLINIC | Age: 33
End: 2023-01-10
Payer: COMMERCIAL

## 2023-01-10 DIAGNOSIS — L70.0 CYSTIC ACNE: ICD-10-CM

## 2023-01-10 PROCEDURE — 99214 OFFICE O/P EST MOD 30 MIN: CPT | Mod: 95 | Performed by: DERMATOLOGY

## 2023-01-10 RX ORDER — ISOTRETINOIN 40 MG/1
40 CAPSULE ORAL 2 TIMES DAILY
Qty: 60 CAPSULE | Refills: 0 | Status: SHIPPED | OUTPATIENT
Start: 2023-01-10 | End: 2023-01-31

## 2023-01-10 ASSESSMENT — PAIN SCALES - GENERAL: PAINLEVEL: NO PAIN (0)

## 2023-01-10 NOTE — NURSING NOTE
Dermatology Rooming Note    Catalina Foster's goals for this visit include:   Chief Complaint   Patient presents with     Accutane     No changes.  Things are going well.  Would like an opinion on what face wash to use.      Aleyda Cornelius, [facility]

## 2023-01-10 NOTE — LETTER
1/10/2023       RE: Catalina Foster  [address]  [facility] [address]     Dear Colleague,    Thank you for referring your patient, Catalina Foster, to the [facility] DERMATOLOGY CLINIC [place] at [facility]. Please see a copy of my visit note below.    [facility] Dermatology Note  Encounter Date: Rodolfo 10, 2023  Telephone with [employer] photos. Location of teledermatologist: [facility] DERMATOLOGY [facility]. Start time: 11:55 am. End time: 12:02pm.    Dermatology Problem List:  1. Acne vulgaris   - Current treatment: isotretinoin 40 mg daily started 8/12/2022, increased to 40 mg BID 9/13/2022  - Prior tx: clindamycin 1% lotion, Retin-A 0.025%, spironolactone, doxycycline   ____________________________________________    Assessment & Plan:     # Acne vulgaris - chronic, active (one rare lesion on the chin today, not at treatment goal fully).  Tolerating side effects from treatment well.   - Continue isotretinoin 40 mg BID as below  - Cumulative dose: 7200 mg   - Goal dose of 13,050 mg - 19,140 mg (150-220 mg/kg in this 89 kg patient)   - IPledge Category: Female of Reproductive Potential (FRP)   - Primary Form of Contraception (if applicable): Nexplanon   - Secondary Form of Contraception (if applicable): Condoms   - Continue gentle cleanser, moisturization  - Sent same photo of home UPT - reminded to send new photo with date each month  - Not donating blood, sharing medication with others.  - Labs ordered today, to be done at her earliest convenience - if normal, will not need to repeat  - Recommended Cetaphil gentle skin cleanser.     Procedures Performed:    None    Follow-up: 1 month(s) in-person, or earlier for new or changing lesions    Staff and Scribe:     Scribe Disclosure:  Vero RAO, am serving as a scribe to document services personally performed by Chuckie Hernandes MD based on data collection and  the provider's statements to me.     Staff attestation:  The documentation recorded by the scribe accurately reflects the services I personally performed and the decisions I personally made. I have made edits where needed.    Chuckie Hernandes MD  Staff Dermatologist and Dermatopathologist  , Department of Dermatology   ____________________________________________    CC: Accutane (No changes.  Things are going well.  Would like an opinion on what face wash to use. )    HPI:  Ms. Catalina Foster is a(n) 32 year old female who presents today as a return patient for Accutane follow-up. Last seen in dermatology on 11/22/22 by me, at which time patient was continued on isotretinoin 40 mg BID for treatment of acne vulgaris.    Today, patient reports that her treatment has been going well. Her skin has been clear, but recently a small cystic acne has erupted on her chin. She denies experiencing muscle aches, joint aches, changes in mood, changes in bowel movements, or stomach issues.     Patient is otherwise feeling well, without additional skin concerns.    Labs Reviewed:  N/A    Physical Exam:  Vitals: There were no vitals taken for this visit.  SKIN: Teledermatology photos were reviewed; image quality and interpretability: acceptable. Image date: 01/10/22.  - Appears almost clear, one apparent nodular lesion developing on the right anterior chin  - No other lesions of concern on areas examined.     Medications:  Current Outpatient Medications   Medication     amoxicillin-clavulanate (AUGMENTIN) 875-125 MG tablet     etonogestrel (NEXPLANON) 68 MG IMPL     ISOtretinoin (ACCUTANE) 40 MG capsule     Probiotic Product (UP4 PROBIOTICS WOMENS) CAPS     Ulipristal Acetate (LANDEN) 30 MG tablet     No current facility-administered medications for this visit.      Past Medical/Surgical History:   Patient Active Problem List   Diagnosis     Acne vulgaris     Acute medial meniscal injury of knee, right,  subsequent encounter     Screening for malignant neoplasm of cervix     Nexplanon in place     Past Medical History:   Diagnosis Date     Obese      Parkinsons disease (H)        CC Estefany Pham MD  [address] [address] on close of this encounter.

## 2023-01-10 NOTE — PROGRESS NOTES
[facility] Dermatology Note  Encounter Date: Rodolfo 10, 2023  Telephone with MyChart photos. Location of teledermatologist: [facility] DERMATOLOGY CLINIC [place]. Start time: 11:55 am. End time: 12:02pm.    Dermatology Problem List:  1. Acne vulgaris   - Current treatment: isotretinoin 40 mg daily started 8/12/2022, increased to 40 mg BID 9/13/2022  - Prior tx: clindamycin 1% lotion, Retin-A 0.025%, spironolactone, doxycycline   ____________________________________________    Assessment & Plan:     # Acne vulgaris - chronic, active (one rare lesion on the chin today, not at treatment goal fully).  Tolerating side effects from treatment well.   - Continue isotretinoin 40 mg BID as below  - Cumulative dose: 7200 mg   - Goal dose of 13,050 mg - 19,140 mg (150-220 mg/kg in this 89 kg patient)   - IPledge Category: Female of Reproductive Potential (FRP)   - Primary Form of Contraception (if applicable): Nexplanon   - Secondary Form of Contraception (if applicable): Condoms   - Continue gentle cleanser, moisturization  - Sent same photo of home UPT - reminded to send new photo with date each month  - Not donating blood, sharing medication with others.  - Labs ordered today, to be done at her earliest convenience - if normal, will not need to repeat  - Recommended Cetaphil gentle skin cleanser.     Procedures Performed:    None    Follow-up: 1 month(s) in-person, or earlier for new or changing lesions    Staff and Scribe:     Scribe Disclosure:  I, Vero Carney, am serving as a scribe to document services personally performed by Chuckie Hernandes MD based on data collection and the provider's statements to me.     Staff attestation:  The documentation recorded by the scribe accurately reflects the services I personally performed and the decisions I personally made. I have made edits where needed.    Chuckie Hernandes MD  Staff Dermatologist and Dermatopathologist  ,  Department of Dermatology   ____________________________________________    CC: Accutane (No changes.  Things are going well.  Would like an opinion on what face wash to use. )    HPI:  Ms. Catalina Foster is a(n) 32 year old female who presents today as a return patient for Accutane follow-up. Last seen in dermatology on 11/22/22 by me, at which time patient was continued on isotretinoin 40 mg BID for treatment of acne vulgaris.    Today, patient reports that her treatment has been going well. Her skin has been clear, but recently a small cystic acne has erupted on her chin. She denies experiencing muscle aches, joint aches, changes in mood, changes in bowel movements, or stomach issues.     Patient is otherwise feeling well, without additional skin concerns.    Labs Reviewed:  N/A    Physical Exam:  Vitals: There were no vitals taken for this visit.  SKIN: Teledermatology photos were reviewed; image quality and interpretability: acceptable. Image date: 01/10/22.  - Appears almost clear, one apparent nodular lesion developing on the right anterior chin  - No other lesions of concern on areas examined.     Medications:  Current Outpatient Medications   Medication     amoxicillin-clavulanate (AUGMENTIN) 875-125 MG tablet     etonogestrel (NEXPLANON) 68 MG IMPL     ISOtretinoin (ACCUTANE) 40 MG capsule     Probiotic Product (UP4 PROBIOTICS WOMENS) CAPS     Ulipristal Acetate (LANDEN) 30 MG tablet     No current facility-administered medications for this visit.      Past Medical/Surgical History:   Patient Active Problem List   Diagnosis     Acne vulgaris     Acute medial meniscal injury of knee, right, subsequent encounter     Screening for malignant neoplasm of cervix     Nexplanon in place     Past Medical History:   Diagnosis Date     Obese      Parkinsons disease (H)        CC Estefany Pham MD  [address] [facility] [address]  [place] [address] on close of this encounter.

## 2023-01-23 ENCOUNTER — OFFICE VISIT (OUTPATIENT)
Dept: OPTOMETRY | Facility: CLINIC | Age: 33
End: 2023-01-23
Payer: COMMERCIAL

## 2023-01-23 DIAGNOSIS — H52.4 ACCOMMODATIVE INSUFFICIENCY: Primary | ICD-10-CM

## 2023-01-23 ASSESSMENT — CONF VISUAL FIELD
OD_SUPERIOR_TEMPORAL_RESTRICTION: 0
OS_NORMAL: 1
OD_NORMAL: 1
OS_INFERIOR_TEMPORAL_RESTRICTION: 0
OD_INFERIOR_NASAL_RESTRICTION: 0
OD_INFERIOR_TEMPORAL_RESTRICTION: 0
OS_SUPERIOR_TEMPORAL_RESTRICTION: 0
OS_INFERIOR_NASAL_RESTRICTION: 0
OS_SUPERIOR_NASAL_RESTRICTION: 0
OD_SUPERIOR_NASAL_RESTRICTION: 0

## 2023-01-23 ASSESSMENT — REFRACTION
OS_AXIS: 125
OS_CYLINDER: +0.25
OS_SPHERE: +0.25
OD_AXIS: 033
OD_CYLINDER: +0.50
OD_SPHERE: +0.00

## 2023-01-23 ASSESSMENT — VISUAL ACUITY
OD_SC: 20/25
OD_SC: 20/20
OS_SC: 20/25
OS_SC: 20/25
METHOD: SNELLEN - LINEAR

## 2023-01-23 ASSESSMENT — CUP TO DISC RATIO
OD_RATIO: 0.25
OS_RATIO: 0.30

## 2023-01-23 ASSESSMENT — TONOMETRY
IOP_METHOD: ICARE
OD_IOP_MMHG: 21
OS_IOP_MMHG: 21

## 2023-01-23 ASSESSMENT — EXTERNAL EXAM - LEFT EYE: OS_EXAM: NORMAL

## 2023-01-23 ASSESSMENT — SLIT LAMP EXAM - LIDS
COMMENTS: NORMAL
COMMENTS: NORMAL

## 2023-01-23 ASSESSMENT — EXTERNAL EXAM - RIGHT EYE: OD_EXAM: NORMAL

## 2023-01-23 ASSESSMENT — REFRACTION_MANIFEST
OS_SPHERE: PLANO
OD_SPHERE: -0.25

## 2023-01-23 NOTE — PATIENT INSTRUCTIONS
Artificial tears:   -Refresh Plus  -Refresh Optive  -Refresh Relieva  -Systane Ultra  -Systane Complete  -Systane Hydration  -Blink Tears  (Notes: Anything in a bottle has preservatives and can be used up to 4x/day. Preservative free vials can be used as much as necessary)    ------    Use +1.25 over-the-counter reading glasses as needed for small print/extended reading.

## 2023-01-23 NOTE — PROGRESS NOTES
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 Asymptomatic at distance  -Damp Rx the same. NRA/PRA does show reduced accommodative range  -Can trial low plus OTC readers to start. Rec around +1.25 prn  -Currently on Accutane without significant ocular sequelae - AT prn  -Dilated ocular health unremarkable OU    Monitor 1-2 years comprehensive, sooner prn

## 2023-01-31 ENCOUNTER — MYC MEDICAL ADVICE (OUTPATIENT)
Dept: DERMATOLOGY | Facility: CLINIC | Age: 33
End: 2023-01-31

## 2023-01-31 ENCOUNTER — VIRTUAL VISIT (OUTPATIENT)
Dept: DERMATOLOGY | Facility: CLINIC | Age: 33
End: 2023-01-31
Payer: COMMERCIAL

## 2023-01-31 DIAGNOSIS — L70.0 CYSTIC ACNE: ICD-10-CM

## 2023-01-31 PROCEDURE — 99213 OFFICE O/P EST LOW 20 MIN: CPT | Mod: 95 | Performed by: DERMATOLOGY

## 2023-01-31 RX ORDER — ISOTRETINOIN 40 MG/1
40 CAPSULE ORAL 2 TIMES DAILY
Qty: 60 CAPSULE | Refills: 0 | Status: SHIPPED | OUTPATIENT
Start: 2023-01-31 | End: 2023-02-07

## 2023-01-31 NOTE — PROGRESS NOTES
[facility] Dermatology Note  Encounter Date: Jan 31, 2023  Store-and-Forward and Telephone ([phone]). Location of teledermatologist: [facility] DERMATOLOGY CLINIC [place].  Start time: 9:25. End time: 9:29.    Dermatology Problem List:  1. Acne vulgaris   - Current treatment: isotretinoin 40 mg daily started 8/12/2022, increased to 40 mg BID 9/13/2022  - Prior tx: clindamycin 1% lotion, Retin-A 0.025%, spironolactone, doxycycline   ____________________________________________    Assessment & Plan:     # Acne vulgaris - chronic, active (one rare lesion on the chin today, not at treatment goal fully).  Tolerating side effects from treatment well.   - Continue isotretinoin 40 mg BID as below  - Cumulative dose: 8,400 mg   - Goal dose of 13,050 mg - 19,140 mg (150-220 mg/kg in this 89 kg patient)   - IPledge Category: Female of Reproductive Potential (FRP)   - Primary Form of Contraception (if applicable): Nexplanon   - Secondary Form of Contraception (if applicable): Condoms   - Continue gentle cleanser, moisturization  - Not donating blood, sharing medication with others.  - Labs ordered today, to be done at her earliest convenience - if normal, will not need to repeat  - Recommended Cetaphil gentle skin cleanser.      Procedures Performed:    None    Follow-up: 1 month(s) in-person, or earlier for new or changing lesions    Staff and Scribe:     Scribe Disclosure:  I, Vero Carney, am serving as a scribe to document services personally performed by Chuckie Hernandes MD based on data collection and the provider's statements to me.     Staff attestation:  The documentation recorded by the scribe accurately reflects the services I personally performed and the decisions I personally made. I have made edits where needed.    Chuckie Hernandes MD  Staff Dermatologist and Dermatopathologist  , Department of Dermatology    ____________________________________________    CC: Derm Problem (Acne - accutane - doing well. No side effects of concern)    HPI:  Ms. Catalina Foster is a(n) 32 year old female who presents today as a return patient for acne follow-up. Last seen in dermatology on 1/10/23 by me, at which time patient was continued on isotretinoin 40 mg BID for treatment of acne vulgaris.     Today, patient reports experiencing nosebleeds on and off. Patient reports her mood has been stable. Denies muscle or joint aches. Denies blurred vision.      Patient is otherwise feeling well, without additional skin concerns.    Labs Reviewed:  N/A    Physical Exam:  Vitals: There were no vitals taken for this visit.  SKIN: Teledermatology photos were reviewed; image quality and interpretability: acceptable. Image date: 1/31/22.  - All clear.  - No other lesions of concern on areas examined.     Medications:  Current Outpatient Medications   Medication     etonogestrel (NEXPLANON) 68 MG IMPL     ISOtretinoin (ACCUTANE) 40 MG capsule     Probiotic Product (UP4 PROBIOTICS WOMENS) CAPS     Ulipristal Acetate (LANDEN) 30 MG tablet     No current facility-administered medications for this visit.      Past Medical/Surgical History:   Patient Active Problem List   Diagnosis     Acne vulgaris     Acute medial meniscal injury of knee, right, subsequent encounter     Screening for malignant neoplasm of cervix     Nexplanon in place     Past Medical History:   Diagnosis Date     Obese      Parkinsons disease (H)        CC Estefany Pham MD  [address] [address] on close of this encounter.

## 2023-01-31 NOTE — NURSING NOTE
Dermatology Rooming Note    Catalina Foster's goals for this visit include:   Chief Complaint   Patient presents with     Derm Problem     Acne - accutane - doing well. No side effects of concern     Irma Peng, CMA

## 2023-01-31 NOTE — LETTER
1/31/2023       RE: Catalina Foster  [address]  [facility] [address]     Dear Colleague,    Thank you for referring your patient, Catalina Foster, to the [facility] DERMATOLOGY CLINIC [place] at [facility]. Please see a copy of my visit note below.    [facility] Dermatology Note  Encounter Date: Jan 31, 2023  Store-and-Forward and Telephone ([phone]). Location of teledermatologist: [facility] DERMATOLOGY CLINIC [place].  Start time: 9:25. End time: 9:29.    Dermatology Problem List:  1. Acne vulgaris   - Current treatment: isotretinoin 40 mg daily started 8/12/2022, increased to 40 mg BID 9/13/2022  - Prior tx: clindamycin 1% lotion, Retin-A 0.025%, spironolactone, doxycycline   ____________________________________________    Assessment & Plan:     # Acne vulgaris - chronic, active (one rare lesion on the chin today, not at treatment goal fully).  Tolerating side effects from treatment well.   - Continue isotretinoin 40 mg BID as below  - Cumulative dose: 8,400 mg   - Goal dose of 13,050 mg - 19,140 mg (150-220 mg/kg in this 89 kg patient)   - IPledge Category: Female of Reproductive Potential (FRP)   - Primary Form of Contraception (if applicable): Nexplanon   - Secondary Form of Contraception (if applicable): Condoms   - Continue gentle cleanser, moisturization  - Not donating blood, sharing medication with others.  - Labs ordered today, to be done at her earliest convenience - if normal, will not need to repeat  - Recommended Cetaphil gentle skin cleanser.      Procedures Performed:    None    Follow-up: 1 month(s) in-person, or earlier for new or changing lesions    Staff and Scribe:     Scribe Disclosure:  Vero RAO, am serving as a scribe to document services personally performed by Chuckie Hernandes MD based on data collection and the provider's statements to me.     Staff attestation:  The  documentation recorded by the scribe accurately reflects the services I personally performed and the decisions I personally made. I have made edits where needed.    Chuckie Hernandes MD  Staff Dermatologist and Dermatopathologist  , Department of Dermatology   ____________________________________________    CC: Derm Problem (Acne - accutane - doing well. No side effects of concern)    HPI:  Ms. Catalina Foster is a(n) 32 year old female who presents today as a return patient for acne follow-up. Last seen in dermatology on 1/10/23 by me, at which time patient was continued on isotretinoin 40 mg BID for treatment of acne vulgaris.     Today, patient reports experiencing nosebleeds on and off. Patient reports her mood has been stable. Denies muscle or joint aches. Denies blurred vision.      Patient is otherwise feeling well, without additional skin concerns.    Labs Reviewed:  N/A    Physical Exam:  Vitals: There were no vitals taken for this visit.  SKIN: Teledermatology photos were reviewed; image quality and interpretability: acceptable. Image date: 1/31/22.  - All clear.  - No other lesions of concern on areas examined.     Medications:  Current Outpatient Medications   Medication     etonogestrel (NEXPLANON) 68 MG IMPL     ISOtretinoin (ACCUTANE) 40 MG capsule     Probiotic Product (UP4 PROBIOTICS WOMENS) CAPS     Ulipristal Acetate (LANDEN) 30 MG tablet     No current facility-administered medications for this visit.      Past Medical/Surgical History:   Patient Active Problem List   Diagnosis     Acne vulgaris     Acute medial meniscal injury of knee, right, subsequent encounter     Screening for malignant neoplasm of cervix     Nexplanon in place     Past Medical History:   Diagnosis Date     Obese      Parkinsons disease (H)        CC Estfeany Pham MD  [address] [address] on close of this encounter.

## 2023-02-07 RX ORDER — ISOTRETINOIN 40 MG/1
40 CAPSULE ORAL 2 TIMES DAILY
Qty: 60 CAPSULE | Refills: 0 | Status: SHIPPED | OUTPATIENT
Start: 2023-02-07 | End: 2023-03-08

## 2023-02-07 NOTE — TELEPHONE ENCOUNTER
Rx for isotretinoin 40 mg BID sent as requested after negative pregnancy test.    Tri Avalos DO PGY-4  Department of Dermatology  [facility]

## 2023-03-07 NOTE — PATIENT INSTRUCTIONS
[facility] Dermatology Visit    Thank you for allowing us to participate in your care. Your findings, instructions and follow-up plan are as follows:         When should I call my doctor?  If you are worsening or not improving, please, contact us or seek urgent care as noted below.     Who should I call with questions (adults)?  [facility] (adult and pediatric): [phone]  [facility] (adult): [phone]  For urgent needs outside of business hours call the [facility] at [phone] and ask for the dermatology resident on call  If this is a medical emergency and you are unable to reach an ER, Call 911    Who should I call with questions (pediatric)?  [facility]- Pediatric Dermatology  Dr. Cely Kurtz, Dr. Robb Herring, Dr. Sharona Anguiano, Shanice Oconnor, PA  Dr. Lima Ball, Dr. Deedee Samano & Dr. Koko Townsend  Non Urgent  Nurse Triage Line; [phone]- Ashley and Katya RN Care Coordinators   Altagracia (/Complex ) [phone]    If you need a prescription refill, please contact your pharmacy. Refills are approved or denied by our physicians during normal business hours, Monday through Fridays  Per office policy, refills will not be granted if you have not been seen within the past year (or sooner depending on your child's condition).    Scheduling Information:  Pediatric Appointment Scheduling and Call Center [phone]  Radiology Scheduling- [phone]  Sedation Unit Scheduling- [phone]  [place] Scheduling- General [phone]; Pediatric Dermatology [phone]  Main  Services: [phone]  Kenyan: [phone]  Vietnamese: [phone]  Hmong/Tushar/Brazilian: [phone]  Preadmission Nursing Department Fax Number: [phone] (fax all pre-operative paperwork to this number)    For urgent matters arising during evenings, weekends, or  holidays that cannot wait for normal business hours please call [phone] and ask for the dermatology resident on call to be paged.

## 2023-03-07 NOTE — PROGRESS NOTES
[facility] Dermatology Note  Encounter Date: Mar 8, 2023  Telephone ([phone]). Location of teledermatologist: [facility] DERMATOLOGY CLINIC [place]. Start time: 12:19 PM. End time: 12:25 PM.    Dermatology Problem List:  1. Acne vulgaris   - Current treatment: isotretinoin (40 mg daily started 8/12/2022, increased to 40 mg BID 9/13/2022)  - Prior tx: clindamycin 1% lotion, Retin-A 0.025%, spironolactone, doxycycline   ____________________________________________    Assessment & Plan:     # Acne vulgaris chronic, active (occ lesion on the chin, not at treatment goal fully).  - Continue isotretinoin 40 mg BID for month #7. One month supply with no refills provided.   - Goal dose is 13,050-19,140 mg for 150-220 mg/kg dosing in this 89 kg patient.   - Cumulative dose: 13,200 mg (as of 3/8/2023).  - Will send photo of pregnancy test over mychart  - iPledge Category: Female of Reproductive Potential (FRP)   - Primary Form of Contraception (if applicable): Nexplanon   - Secondary Form of Contraception (if applicable): Condoms   - Continue gentle cleanser and moisturizer use PRN for dryness.  - Future: could consider spironolactone at some point if not clearing fully on isotretinoin, she previously was on and it helped but she is not interested in being on spironolactone long-term    Procedures Performed:    None    Follow-up: as scheduled on 4/4/2023    Staff and Scribe:     Scribe Disclosure:  I, Elan Carlos, am serving as a scribe to document services personally performed by Amalia Brannon MD based on data collection and the provider's statements to me.     Provider Disclosure:   The documentation recorded by the scribe accurately reflects the services I personally performed and the decisions made by me.    Amalia Brannon MD    Department of Dermatology  [facility]: Phone: [phone],  Fax: [phone]  3/8/2023     ____________________________________________    CC: Derm Problem (Acne - accutane - doing well. )    HPI:  Ms. Catalina Foster is a(n) 32 year old female who presents today as a return patient for acne. Last seen in dermatology virtually by Dr. Hernandes on 1/31/2023, at which time patient was continued on isotretinoin for treatment of acne vulgaris.    Today, doing well.   Dealing with dryness.  No outbreaks but still has 1-2 cystic ones that pop up on chin that never come to a head. Don't think is hormonal.    No vision changes, headaches, myalgias, joint aches, GI upset, diarrhea, mood changes, suicidal ideation.    Patient is otherwise feeling well, without additional skin concerns.    Labs Reviewed:  N/A    Physical Exam:  Vitals: There were no vitals taken for this visit.  SKIN: Teledermatology photos were reviewed; image quality and interpretability: acceptable. Image date: 3/8/2023.  - Skin is clear.  - No other lesions of concern on areas examined.             Medications:  Current Outpatient Medications   Medication     etonogestrel (NEXPLANON) 68 MG IMPL     ISOtretinoin (ACCUTANE) 40 MG capsule     Probiotic Product (UP4 PROBIOTICS WOMENS) CAPS     Ulipristal Acetate (LANDEN) 30 MG tablet     No current facility-administered medications for this visit.      Past Medical/Surgical History:   Patient Active Problem List   Diagnosis     Acne vulgaris     Acute medial meniscal injury of knee, right, subsequent encounter     Screening for malignant neoplasm of cervix     Nexplanon in place     Past Medical History:   Diagnosis Date     Obese      Parkinsons disease (H)        CC Estefany Pham MD  [address] [facility] [address]  [place] [address] on close of this encounter.

## 2023-03-08 ENCOUNTER — VIRTUAL VISIT (OUTPATIENT)
Dept: DERMATOLOGY | Facility: CLINIC | Age: 33
End: 2023-03-08
Payer: COMMERCIAL

## 2023-03-08 DIAGNOSIS — Z51.81 THERAPEUTIC DRUG MONITORING: ICD-10-CM

## 2023-03-08 DIAGNOSIS — L70.0 CYSTIC ACNE: Primary | ICD-10-CM

## 2023-03-08 PROCEDURE — 99214 OFFICE O/P EST MOD 30 MIN: CPT | Mod: 95 | Performed by: DERMATOLOGY

## 2023-03-08 RX ORDER — ISOTRETINOIN 40 MG/1
40 CAPSULE ORAL 2 TIMES DAILY
Qty: 60 CAPSULE | Refills: 0 | Status: SHIPPED | OUTPATIENT
Start: 2023-03-08 | End: 2023-04-04

## 2023-03-08 NOTE — LETTER
3/8/2023       RE: Catalina Foster  [address]  [place] [address]     Dear Colleague,    Thank you for referring your patient, Catalina Foster, to the [facility] DERMATOLOGY CLINIC [place] at [facility]. Please see a copy of my visit note below.    [facility] Dermatology Note  Encounter Date: Mar 8, 2023  Telephone ([phone]). Location of teledermatologist: [facility] DERMATOLOGY CLINIC [place]. Start time: 12:19 PM. End time: 12:25 PM.    Dermatology Problem List:  1. Acne vulgaris   - Current treatment: isotretinoin (40 mg daily started 8/12/2022, increased to 40 mg BID 9/13/2022)  - Prior tx: clindamycin 1% lotion, Retin-A 0.025%, spironolactone, doxycycline   ____________________________________________    Assessment & Plan:     # Acne vulgaris chronic, active (occ lesion on the chin, not at treatment goal fully).  - Continue isotretinoin 40 mg BID for month #7. One month supply with no refills provided.   - Goal dose is 13,050-19,140 mg for 150-220 mg/kg dosing in this 89 kg patient.   - Cumulative dose: 13,200 mg (as of 3/8/2023).  - Will send photo of pregnancy test over [employer] Category: Female of Reproductive Potential (FRP)   - Primary Form of Contraception (if applicable): Nexplanon   - Secondary Form of Contraception (if applicable): Condoms   - Continue gentle cleanser and moisturizer use PRN for dryness.  - Future: could consider spironolactone at some point if not clearing fully on isotretinoin, she previously was on and it helped but she is not interested in being on spironolactone long-term    Procedures Performed:    None    Follow-up: as scheduled on 4/4/2023    Staff and Scribe:     Scribe Disclosure:  Elan RAO, am serving as a scribe to document services personally performed by Amalia Brannon MD based on data collection and the provider's statements to me.     Provider  Disclosure:   The documentation recorded by the scribe accurately reflects the services I personally performed and the decisions made by me.    Amalia Brannon MD    Department of Dermatology  [facility]: Phone: [phone], Fax: [phone]  3/8/2023     ____________________________________________    CC: Derm Problem (Acne - accutane - doing well. )    HPI:  Ms. Catalina Foster is a(n) 32 year old female who presents today as a return patient for acne. Last seen in dermatology virtually by Dr. Hernandes on 1/31/2023, at which time patient was continued on isotretinoin for treatment of acne vulgaris.    Today, doing well.   Dealing with dryness.  No outbreaks but still has 1-2 cystic ones that pop up on chin that never come to a head. Don't think is hormonal.    No vision changes, headaches, myalgias, joint aches, GI upset, diarrhea, mood changes, suicidal ideation.    Patient is otherwise feeling well, without additional skin concerns.    Labs Reviewed:  N/A    Physical Exam:  Vitals: There were no vitals taken for this visit.  SKIN: Teledermatology photos were reviewed; image quality and interpretability: acceptable. Image date: 3/8/2023.  - Skin is clear.  - No other lesions of concern on areas examined.             Medications:  Current Outpatient Medications   Medication     etonogestrel (NEXPLANON) 68 MG IMPL     ISOtretinoin (ACCUTANE) 40 MG capsule     Probiotic Product (UP4 PROBIOTICS WOMENS) CAPS     Ulipristal Acetate (LANDEN) 30 MG tablet     No current facility-administered medications for this visit.      Past Medical/Surgical History:   Patient Active Problem List   Diagnosis     Acne vulgaris     Acute medial meniscal injury of knee, right, subsequent encounter     Screening for malignant neoplasm of cervix     Nexplanon in place     Past Medical History:   Diagnosis Date     Obese      Parkinsons disease (H)         CC Estefany Pham MD  [address] [facility] [address]  [place] [address] on close of this encounter.

## 2023-03-08 NOTE — NURSING NOTE
Dermatology Rooming Note    Catalina Foster's goals for this visit include:   Chief Complaint   Patient presents with     Derm Problem     Acne - accutane - doing well.      Irma Peng, CMA

## 2023-04-04 ENCOUNTER — VIRTUAL VISIT (OUTPATIENT)
Dept: DERMATOLOGY | Facility: CLINIC | Age: 33
End: 2023-04-04
Payer: COMMERCIAL

## 2023-04-04 DIAGNOSIS — L70.0 CYSTIC ACNE: ICD-10-CM

## 2023-04-04 DIAGNOSIS — L70.0 ACNE VULGARIS: Primary | ICD-10-CM

## 2023-04-04 PROCEDURE — 99214 OFFICE O/P EST MOD 30 MIN: CPT | Mod: 95 | Performed by: DERMATOLOGY

## 2023-04-04 RX ORDER — ISOTRETINOIN 40 MG/1
40 CAPSULE ORAL 2 TIMES DAILY
Qty: 60 CAPSULE | Refills: 0 | Status: SHIPPED | OUTPATIENT
Start: 2023-04-04 | End: 2023-06-20

## 2023-04-04 ASSESSMENT — PAIN SCALES - GENERAL: PAINLEVEL: NO PAIN (0)

## 2023-04-04 NOTE — NURSING NOTE
Dermatology Rooming Note    Catalina Foster's goals for this visit include:   Chief Complaint   Patient presents with     Derm Problem     Accutane - Catalina state she has been stable      Margie HESS., SURYA

## 2023-04-04 NOTE — PATIENT INSTRUCTIONS
[facility] Dermatology Visit    Thank you for allowing us to participate in your care. Your findings, instructions and follow-up plan are as follows:         When should I call my doctor?  If you are worsening or not improving, please, contact us or seek urgent care as noted below.     Who should I call with questions (adults)?  [facility] (adult and pediatric): [phone]  [facility] (adult): [phone]  For urgent needs outside of business hours call the [facility] at [phone] and ask for the dermatology resident on call  If this is a medical emergency and you are unable to reach an ER, Call 911    Who should I call with questions (pediatric)?  [facility]- Pediatric Dermatology  Dr. Cely Kurtz, Dr. Robb Herring, Dr. Sharona Anguiano, Shanice Oconnor, PA  Dr. Lima Ball, Dr. Deedee Samano & Dr. Koko Townsend  Non Urgent  Nurse Triage Line; [phone]- Ashley and Katya RN Care Coordinators   Altagracia (/Complex ) [phone]    If you need a prescription refill, please contact your pharmacy. Refills are approved or denied by our physicians during normal business hours, Monday through Fridays  Per office policy, refills will not be granted if you have not been seen within the past year (or sooner depending on your child's condition).    Scheduling Information:  Pediatric Appointment Scheduling and Call Center [phone]  Radiology Scheduling- [phone]  Sedation Unit Scheduling- [phone]  [place] Scheduling- General [phone]; Pediatric Dermatology [phone]  Main  Services: [phone]  Cuban: [phone]  Citizen of the [place]: [phone]  Hmong/Tushar/Mauritian: [phone]  Preadmission Nursing Department Fax Number: [phone] (fax all pre-operative paperwork to this number)    For urgent matters arising during evenings, weekends, or  holidays that cannot wait for normal business hours please call [phone] and ask for the dermatology resident on call to be paged.    Universal Safety Interventions

## 2023-04-04 NOTE — PROGRESS NOTES
[facility] Dermatology Note  Encounter Date: Apr 4, 2023  Store-and-Forward and Telephone ([phone]). Location of teledermatologist: [facility] DERMATOLOGY CLINIC [place].  Start time: 11:49 AM. End time: 11:54 AM.    Dermatology Problem List:  1. Acne vulgaris   - Current treatment: isotretinoin (40 mg daily started 8/12/2022, increased to 40 mg BID 9/13/2022)  - Prior tx: clindamycin 1% lotion, Retin-A 0.025%, spironolactone, doxycycline     ____________________________________________    Assessment & Plan:     # Acne vulgaris chronic, active, improved.  Pt nearly to upper end of goal range. She has two persistent lesions on chin, which actually might not be acneiform (?scar tissue ?other). Discussed options of stopping isotretinoin today versus one more month, we will plan for one more month. Still wonder about benefit of resuming spironolactone but if lesions on chin are not acneiform, may not be needed. Will have an in-person visit next month so that we can better assess and formulate plan.  - Continue isotretinoin 40 mg BID for one final month. One month supply with no refills provided.   - Goal dose is 13,050-19,140 mg for 150-220 mg/kg dosing in this 89 kg patient.   - Cumulative dose: 15,600 mg (as of 4/4/2023).  - Will send photo of pregnancy test over mychart  - iPledge Category: Female of Reproductive Potential (FRP)   - Primary Form of Contraception (if applicable): Nexplanon   - Secondary Form of Contraception (if applicable): Condoms   - Continue gentle cleanser and moisturizer use PRN for dryness.  - Future: could consider spironolactone at some point if not clearing fully on isotretinoin, she previously was on and it helped but she is not interested in being on spironolactone long-term       Procedures Performed:    None    Follow-up: 1 month, sooner if concerns.     Staff:     Amalia Brannon MD    Department of Dermatology  [facility]  [facility]: Phone: [phone], Fax: [phone]  4/4/2023    ____________________________________________    CC: Derm Problem (Accutane - Catalina state she has been stable )    HPI:  Ms. Catalina Foster is a(n) 32 year old female who presents today as a return patient for acne and isotretinoin.    Doing overall great.  No major flares.  The back was the reason she went on spironolactone and this has been stable.  The two little spots on her chin that have been present are pretty stable. Don't feel like a pimple, not as hard.    No vision changes, headaches, myalgias, joint aches, GI upset, mood changes, suicidal ideation.    Patient is otherwise feeling well, without additional skin concerns.    Labs Reviewed:  N/A    Physical Exam:  Vitals: There were no vitals taken for this visit.  SKIN: Teledermatology photos were reviewed; image quality and interpretability: acceptable. Image date: see chart.  - face, back clear of acneiform lesions.  - faintly erythematous macules on bilateral lateral chin  - No other lesions of concern on areas examined.     Medications:  Current Outpatient Medications   Medication     etonogestrel (NEXPLANON) 68 MG IMPL     ISOtretinoin (ACCUTANE) 40 MG capsule     Probiotic Product (UP4 PROBIOTICS WOMENS) CAPS     Ulipristal Acetate (LANDEN) 30 MG tablet     No current facility-administered medications for this visit.      Past Medical/Surgical History:   Patient Active Problem List   Diagnosis     Acne vulgaris     Acute medial meniscal injury of knee, right, subsequent encounter     Screening for malignant neoplasm of cervix     Nexplanon in place     Past Medical History:   Diagnosis Date     Obese      Parkinsons disease (H)        CC Estefany Pham MD  [address] [facility] [address]  [place] [address] on close of this encounter.

## 2023-04-04 NOTE — LETTER
4/4/2023       RE: Catalina Foster  [address]  [facility] [address]     Dear Colleague,    Thank you for referring your patient, Catalina Foster, to the [facility]ERMATOLOG[facility] [place] at [facility]. Please see a copy of my visit note below.    [facility] Dermatology Note  Encounter Date: Apr 4, 2023  [employer] and Telephone ([phone]). Location of teledermatologist: [facility] [place].  Start time: 11:49 AM. End time: 11:54 AM.    Dermatology Problem List:  1. Acne vulgaris   - Current treatment: isotretinoin (40 mg daily started 8/12/2022, increased to 40 mg BID 9/13/2022)  - Prior tx: clindamycin 1% lotion, Retin-A 0.025%, spironolactone, doxycycline     ____________________________________________    Assessment & Plan:     # Acne vulgaris chronic, active, improved.  Pt nearly to upper end of goal range. She has two persistent lesions on chin, which actually might not be acneiform (?scar tissue ?other). Discussed options of stopping isotretinoin today versus one more month, we will plan for one more month. Still wonder about benefit of resuming spironolactone but if lesions on chin are not acneiform, may not be needed. Will have an in-person visit next month so that we can better assess and formulate plan.  - Continue isotretinoin 40 mg BID for one final month. One month supply with no refills provided.   - Goal dose is 13,050-19,140 mg for 150-220 mg/kg dosing in this 89 kg patient.   - Cumulative dose: 15,600 mg (as of 4/4/2023).  - Will send photo of pregnancy test over mychart  - iPledge Category: Female of Reproductive Potential (FRP)   - Primary Form of Contraception (if applicable): Nexplanon   - Secondary Form of Contraception (if applicable): Condoms   - Continue gentle cleanser and moisturizer use PRN for dryness.  - Future: could consider spironolactone at some point if not  clearing fully on isotretinoin, she previously was on and it helped but she is not interested in being on spironolactone long-term       Procedures Performed:    None    Follow-up: 1 month, sooner if concerns.     Staff:     Amalia Brannon MD    Department of Dermatology  [facility]: Phone: [phone], Fax: [phone]  4/4/2023    ____________________________________________    CC: Derm Problem (Accutane - Catalina state she has been stable )    HPI:  Ms. Catalina Foster is a(n) 32 year old female who presents today as a return patient for acne and isotretinoin.    Doing overall great.  No major flares.  The back was the reason she went on spironolactone and this has been stable.  The two little spots on her chin that have been present are pretty stable. Don't feel like a pimple, not as hard.    No vision changes, headaches, myalgias, joint aches, GI upset, mood changes, suicidal ideation.    Patient is otherwise feeling well, without additional skin concerns.    Labs Reviewed:  N/A    Physical Exam:  Vitals: There were no vitals taken for this visit.  SKIN: Teledermatology photos were reviewed; image quality and interpretability: acceptable. Image date: see chart.  - face, back clear of acneiform lesions.  - faintly erythematous macules on bilateral lateral chin  - No other lesions of concern on areas examined.     Medications:  Current Outpatient Medications   Medication    etonogestrel (NEXPLANON) 68 MG IMPL    ISOtretinoin (ACCUTANE) 40 MG capsule    Probiotic Product (UP4 PROBIOTICS WOMENS) CAPS    Ulipristal Acetate (LANDEN) 30 MG tablet     No current facility-administered medications for this visit.      Past Medical/Surgical History:   Patient Active Problem List   Diagnosis    Acne vulgaris    Acute medial meniscal injury of knee, right, subsequent encounter    Screening for malignant neoplasm of cervix     Nexplanon in place     Past Medical History:   Diagnosis Date    Obese     Parkinsons disease (H)        CC Estefany Pham MD  [address] [facility] [address]  [place] [address] on close of this encounter.

## 2023-04-24 ENCOUNTER — OFFICE VISIT (OUTPATIENT)
Dept: FAMILY MEDICINE | Facility: CLINIC | Age: 33
End: 2023-04-24
Attending: FAMILY MEDICINE
Payer: COMMERCIAL

## 2023-04-24 VITALS
TEMPERATURE: 97.2 F | DIASTOLIC BLOOD PRESSURE: 70 MMHG | OXYGEN SATURATION: 97 % | RESPIRATION RATE: 14 BRPM | BODY MASS INDEX: 31.63 KG/M2 | WEIGHT: 201.5 LBS | HEIGHT: 67 IN | HEART RATE: 59 BPM | SYSTOLIC BLOOD PRESSURE: 107 MMHG

## 2023-04-24 DIAGNOSIS — Z00.00 ENCOUNTER FOR PREVENTATIVE ADULT HEALTH CARE EXAMINATION: Primary | ICD-10-CM

## 2023-04-24 PROCEDURE — 99395 PREV VISIT EST AGE 18-39: CPT | Performed by: FAMILY MEDICINE

## 2023-04-24 ASSESSMENT — ENCOUNTER SYMPTOMS
JOINT SWELLING: 0
PARESTHESIAS: 0
SHORTNESS OF BREATH: 0
DIARRHEA: 0
WEAKNESS: 0
FEVER: 0
NERVOUS/ANXIOUS: 0
FREQUENCY: 0
NAUSEA: 0
PALPITATIONS: 0
BREAST MASS: 0
COUGH: 0
MYALGIAS: 0
SORE THROAT: 0
CHILLS: 0
EYE PAIN: 0
DYSURIA: 0
HEMATURIA: 0
HEMATOCHEZIA: 0
HEARTBURN: 1
HEADACHES: 0
ARTHRALGIAS: 0
CONSTIPATION: 0
DIZZINESS: 0
ABDOMINAL PAIN: 0

## 2023-04-24 ASSESSMENT — PAIN SCALES - GENERAL: PAINLEVEL: NO PAIN (0)

## 2023-04-24 NOTE — PROGRESS NOTES
SUBJECTIVE:   CC: Catalina is an 32 year old who presents for preventive health visit.       2023     8:54 AM   Additional Questions   Roomed by Brent STONE     Patient has been advised of split billing requirements and indicates understanding: Yes  Healthy Habits:     Getting at least 3 servings of Calcium per day:  Yes    Bi-annual eye exam:  Yes    Dental care twice a year:  Yes    Sleep apnea or symptoms of sleep apnea:  None    Diet:  Regular (no restrictions)    Frequency of exercise:  4-5 days/week    Duration of exercise:  30-45 minutes    Taking medications regularly:  Yes    Medication side effects:  None    PHQ-2 Total Score: 0    Additional concerns today:  Yes    Today's PHQ-2 Score:       2023     7:43 AM   PHQ-2 (  [employer])   Q1: Little interest or pleasure in doing things 0   Q2: Feeling down, depressed or hopeless 0   PHQ-2 Score 0   Q1: Little interest or pleasure in doing things Not at all   Q2: Feeling down, depressed or hopeless Not at all   PHQ-2 Score 0       Social History     Tobacco Use     Smoking status: Never     Smokeless tobacco: Never   Vaping Use     Vaping status: Never Used   Substance Use Topics     Alcohol use: Not Currently           2023     7:43 AM   Alcohol Use   Prescreen: >3 drinks/day or >7 drinks/week? No     Breast Cancer Screenin/20/2022     7:47 AM   Breast CA Risk Assessment (FHS-7)   Do you have a family history of breast, colon, or ovarian cancer? No / Unknown     History of abnormal Pap smear: NO - age 30-65 PAP every 5 years with negative HPV co-testing recommended      2022    10:20 AM   PAP / HPV   PAP Negative for Intraepithelial Lesion or Malignancy (NILM)       Reviewed and updated as needed this visit by clinical staff   Tobacco  Allergies  Meds   Med Hx            Reviewed and updated as needed this visit by Provider     Meds   Med Hx             Review of Systems   Constitutional: Negative for chills and fever.   HENT:  "Positive for congestion. Negative for ear pain, hearing loss and sore throat.    Eyes: Negative for pain and visual disturbance.   Respiratory: Negative for cough and shortness of breath.    Cardiovascular: Negative for chest pain, palpitations and peripheral edema.   Gastrointestinal: Positive for heartburn. Negative for abdominal pain, constipation, diarrhea, hematochezia and nausea.   Breasts:  Negative for tenderness, breast mass and discharge.   Genitourinary: Positive for vaginal discharge. Negative for dysuria, frequency, genital sores, hematuria, pelvic pain, urgency and vaginal bleeding.   Musculoskeletal: Negative for arthralgias, joint swelling and myalgias.   Skin: Negative for rash.   Neurological: Negative for dizziness, weakness, headaches and paresthesias.   Psychiatric/Behavioral: Negative for mood changes. The patient is not nervous/anxious.       OBJECTIVE:   /70   Pulse 59   Temp 97.2  F (36.2  C) (Temporal)   Resp 14   Ht 1.692 m (5' 6.6\")   Wt 91.4 kg (201 lb 8 oz)   LMP 04/17/2023 (Within Days)   SpO2 97%   BMI 31.94 kg/m    Physical Exam  GENERAL: healthy, alert and no distress  EYES: Eyes grossly normal to inspection, PERRL and conjunctivae and sclerae normal  HENT: ear canals and TM's normal, nose and mouth without ulcers or lesions  NECK: no adenopathy, no asymmetry, masses, or scars and thyroid normal to palpation  RESP: lungs clear to auscultation - no rales, rhonchi or wheezes  BREAST: normal without masses, tenderness or nipple discharge and no palpable axillary masses or adenopathy  CV: regular rate and rhythm, normal S1 S2, no S3 or S4, no murmur, click or rub, no peripheral edema and peripheral pulses strong  ABDOMEN: soft, nontender, no hepatosplenomegaly, no masses and bowel sounds normal  MS: no gross musculoskeletal defects noted, no edema  SKIN: no suspicious lesions or rashes  NEURO: Normal strength and tone, mentation intact and speech normal  PSYCH: mentation " "appears normal, affect normal/bright    Diagnostic Test Results:  Labs reviewed in Epic    ASSESSMENT/PLAN:       ICD-10-CM    1. Encounter for preventative adult health care examination  Z00.00 CBC with platelets     Comprehensive metabolic panel (BMP + Alb, Alk Phos, ALT, AST, Total. Bili, TP)        Patient is on isotretinoin for cystic acne. She will be getting her labs done in 2 weeks.       COUNSELING:  Reviewed preventive health counseling, as reflected in patient instructions       Regular exercise       Healthy diet/nutrition      BMI:   Estimated body mass index is 31.94 kg/m  as calculated from the following:    Height as of this encounter: 1.692 m (5' 6.6\").    Weight as of this encounter: 91.4 kg (201 lb 8 oz).       She reports that she has never smoked. She has never used smokeless tobacco.    Estefany Pham MD  [facility]  "

## 2023-05-08 ENCOUNTER — MYC MEDICAL ADVICE (OUTPATIENT)
Dept: DERMATOLOGY | Facility: CLINIC | Age: 33
End: 2023-05-08
Payer: COMMERCIAL

## 2023-05-08 NOTE — PROGRESS NOTES
[facility] Dermatology Note  Encounter Date: May 9, 2023  Telephone ([phone] ). Location of teledermatologist: [facility] [place]. Start time: 7:40 AM. End time: 7:47 AM.    Dermatology Problem List:  1. Acne vulgaris   - Current treatment: s/p isotretinoin 40-80 (started 8/12/22, completed 5/9/23), reached 202 mg/kg dosing  - Prior tx: clindamycin 1% lotion, Retin-A 0.025%, spironolactone, doxycycline   ____________________________________________    Assessment & Plan:     # Acne vulgaris chronic, active, improved.  Pt has reached 202 mg/kg dosing. Two persistent lesion on chin improved, will stop isotretinoin today. Plan for repeat labs in 1 month including recheck of TGs as these were elevated in 11/2022 to 300s and pregnancy test, PCP also wants repeat CBC/CMP. Discussed resuming topicals versus wait/see, we will wait/see. In-person visit next in 3 months.  - Stop isotretinoin   - Reached 202 mg/kg dosing  - Continue gentle cleanser and moisturizer use PRN for dryness.  - Labs in 1 month: hCG, lipids (also CBC/CMP with PCP)  - Hold on topicals for now, could resume in future if needed  - Plan for clinic visit 3 months  - Future: could consider spironolactone at some point if not clearing fully on isotretinoin, she previously was on and it helped but she is not interested in being on spironolactone long-term  *reviewed lipid panel 11/2022    Procedures Performed:    None    Follow-up: 3 months, sooner if concerns.     Staff and Scribe:      Scribe Disclosure:  I, SILVERIO CUNNINGHAM, am serving as a scribe to document services personally performed by Amalia Brannon MD based on data collection and the provider's statements to me.     Provider Disclosure:   The documentation recorded by the scribe accurately reflects the services I personally performed and the decisions made by me.    Amalia Brannon MD    Department of  Dermatology  [facility]: Phone: [phone], Fax: [phone]  5/9/2023     ____________________________________________    CC: Accutane (Accutane follow up)    HPI:  Ms. Catalina Foster is a(n) 32 year old female who presents today as a return patient for acne. Last seen by myself virtually on 4/4/23, at which time patient was continued on isotretinoin for treatment of acne vulgaris.     Skin doing well.  Chin spots getting smaller and smaller    Patient is otherwise feeling well, without additional skin concerns.    Labs Reviewed:  Triglycerides   Date Value Ref Range Status   11/29/2022 303 (H) <150 mg/dL Final   05/11/2021 145 <150 mg/dL Final     LDL Cholesterol Calculated   Date Value Ref Range Status   11/29/2022 88 <=100 mg/dL Final   05/11/2021 99 <100 mg/dL Final     Comment:     Desirable:       <100 mg/dl     HDL Cholesterol   Date Value Ref Range Status   05/11/2021 80 >49 mg/dL Final     Direct Measure HDL   Date Value Ref Range Status   11/29/2022 47 (L) >=50 mg/dL Final       Physical Exam:  Vitals: LMP 04/17/2023 (Within Days)   SKIN: Teledermatology photos were reviewed; image quality and interpretability: acceptable. Image date: see chart.  - face clear, maybe one acneiform papule right chin  - No other lesions of concern on areas examined.     Medications:  Current Outpatient Medications   Medication     etonogestrel (NEXPLANON) 68 MG IMPL     ISOtretinoin (ACCUTANE) 40 MG capsule     Probiotic Product (UP4 PROBIOTICS WOMENS) CAPS     Ulipristal Acetate (LANDEN) 30 MG tablet     No current facility-administered medications for this visit.      Past Medical/Surgical History:   Patient Active Problem List   Diagnosis     Acne vulgaris     Acute medial meniscal injury of knee, right, subsequent encounter     Screening for malignant neoplasm of cervix     Nexplanon in place     Past Medical History:   Diagnosis Date     Obese         CC No referring provider defined for this encounter. on close of this encounter.

## 2023-05-09 ENCOUNTER — VIRTUAL VISIT (OUTPATIENT)
Dept: DERMATOLOGY | Facility: CLINIC | Age: 33
End: 2023-05-09
Payer: COMMERCIAL

## 2023-05-09 DIAGNOSIS — L70.0 ACNE VULGARIS: Primary | ICD-10-CM

## 2023-05-09 DIAGNOSIS — Z51.81 THERAPEUTIC DRUG MONITORING: ICD-10-CM

## 2023-05-09 PROCEDURE — 99214 OFFICE O/P EST MOD 30 MIN: CPT | Mod: 95 | Performed by: DERMATOLOGY

## 2023-05-09 ASSESSMENT — PAIN SCALES - GENERAL: PAINLEVEL: NO PAIN (0)

## 2023-05-09 NOTE — LETTER
5/9/2023       RE: Catalina Foster  [address]  [facility] [address]     Dear Colleague,    Thank you for referring your patient, Catalina Foster, to the [facility] DERMATOLOGY [facility] [place] at [facility]. Please see a copy of my visit note below.    [facility] Dermatology Note  Encounter Date: May 9, 2023  Telephone ([phone] ). Location of teledermatologist: [facility] [place]. Start time: 7:40 AM. End time: 7:47 AM.    Dermatology Problem List:  1. Acne vulgaris   - Current treatment: s/p isotretinoin 40-80 (started 8/12/22, completed 5/9/23), reached 202 mg/kg dosing  - Prior tx: clindamycin 1% lotion, Retin-A 0.025%, spironolactone, doxycycline   ____________________________________________    Assessment & Plan:     # Acne vulgaris chronic, active, improved.  Pt has reached 202 mg/kg dosing. Two persistent lesion on chin improved, will stop isotretinoin today. Plan for repeat labs in 1 month including recheck of TGs as these were elevated in 11/2022 to 300s and pregnancy test, PCP also wants repeat CBC/CMP. Discussed resuming topicals versus wait/see, we will wait/see. In-person visit next in 3 months.  - Stop isotretinoin   - Reached 202 mg/kg dosing  - Continue gentle cleanser and moisturizer use PRN for dryness.  - Labs in 1 month: hCG, lipids (also CBC/CMP with PCP)  - Hold on topicals for now, could resume in future if needed  - Plan for clinic visit 3 months  - Future: could consider spironolactone at some point if not clearing fully on isotretinoin, she previously was on and it helped but she is not interested in being on spironolactone long-term  *reviewed lipid panel 11/2022    Procedures Performed:    None    Follow-up: 3 months, sooner if concerns.     Staff and Scribe:      Scribe Disclosure:  SILVERIO RAO, am serving as a scribe to document services personally performed by  Amalia Brannon MD based on data collection and the provider's statements to me.     Provider Disclosure:   The documentation recorded by the scribe accurately reflects the services I personally performed and the decisions made by me.    Amalia Brannon MD    Department of Dermatology  [facility]: Phone: [phone], Fax: [phone]  5/9/2023     ____________________________________________    CC: Accutane (Accutane follow up)    HPI:  Ms. Catalina Foster is a(n) 32 year old female who presents today as a return patient for acne. Last seen by myself virtually on 4/4/23, at which time patient was continued on isotretinoin for treatment of acne vulgaris.     Skin doing well.  Chin spots getting smaller and smaller    Patient is otherwise feeling well, without additional skin concerns.    Labs Reviewed:  Triglycerides   Date Value Ref Range Status   11/29/2022 303 (H) <150 mg/dL Final   05/11/2021 145 <150 mg/dL Final     LDL Cholesterol Calculated   Date Value Ref Range Status   11/29/2022 88 <=100 mg/dL Final   05/11/2021 99 <100 mg/dL Final     Comment:     Desirable:       <100 mg/dl     HDL Cholesterol   Date Value Ref Range Status   05/11/2021 80 >49 mg/dL Final     Direct Measure HDL   Date Value Ref Range Status   11/29/2022 47 (L) >=50 mg/dL Final       Physical Exam:  Vitals: LMP 04/17/2023 (Within Days)   SKIN: Teledermatology photos were reviewed; image quality and interpretability: acceptable. Image date: see chart.  - face clear, maybe one acneiform papule right chin  - No other lesions of concern on areas examined.     Medications:  Current Outpatient Medications   Medication    etonogestrel (NEXPLANON) 68 MG IMPL    ISOtretinoin (ACCUTANE) 40 MG capsule    Probiotic Product (UP4 PROBIOTICS WOMENS) CAPS    Ulipristal Acetate (LANDEN) 30 MG tablet     No current facility-administered medications for this  visit.      Past Medical/Surgical History:   Patient Active Problem List   Diagnosis    Acne vulgaris    Acute medial meniscal injury of knee, right, subsequent encounter    Screening for malignant neoplasm of cervix    Nexplanon in place     Past Medical History:   Diagnosis Date    Obese        CC No referring provider defined for this encounter. on close of this encounter.

## 2023-05-09 NOTE — NURSING NOTE
Dermatology Rooming Note    Catalina Foster's goals for this visit include:   Chief Complaint   Patient presents with     Accutane     Accutane follow up     Joselyn Jackson RN

## 2023-06-20 ENCOUNTER — OFFICE VISIT (OUTPATIENT)
Dept: OBGYN | Facility: CLINIC | Age: 33
End: 2023-06-20
Payer: COMMERCIAL

## 2023-06-20 VITALS
HEART RATE: 98 BPM | WEIGHT: 203.7 LBS | OXYGEN SATURATION: 97 % | SYSTOLIC BLOOD PRESSURE: 120 MMHG | HEIGHT: 67 IN | BODY MASS INDEX: 31.97 KG/M2 | DIASTOLIC BLOOD PRESSURE: 77 MMHG

## 2023-06-20 DIAGNOSIS — Z31.69 ENCOUNTER FOR PRECONCEPTION CONSULTATION: ICD-10-CM

## 2023-06-20 DIAGNOSIS — Z30.46 ENCOUNTER FOR NEXPLANON REMOVAL: ICD-10-CM

## 2023-06-20 DIAGNOSIS — Z01.419 ENCOUNTER FOR GYNECOLOGICAL EXAMINATION WITHOUT ABNORMAL FINDING: Primary | ICD-10-CM

## 2023-06-20 DIAGNOSIS — Z51.81 THERAPEUTIC DRUG MONITORING: ICD-10-CM

## 2023-06-20 DIAGNOSIS — Z00.00 ENCOUNTER FOR PREVENTATIVE ADULT HEALTH CARE EXAMINATION: ICD-10-CM

## 2023-06-20 LAB
ALBUMIN SERPL BCG-MCNC: 4.2 G/DL (ref 3.5–5.2)
ALP SERPL-CCNC: 60 U/L (ref 35–104)
ALT SERPL W P-5'-P-CCNC: 18 U/L (ref 0–50)
ANION GAP SERPL CALCULATED.3IONS-SCNC: 10 MMOL/L (ref 7–15)
AST SERPL W P-5'-P-CCNC: 19 U/L (ref 0–45)
BILIRUB SERPL-MCNC: 0.3 MG/DL
BUN SERPL-MCNC: 7.5 MG/DL (ref 6–20)
CALCIUM SERPL-MCNC: 9 MG/DL (ref 8.6–10)
CHLORIDE SERPL-SCNC: 105 MMOL/L (ref 98–107)
CHOLEST SERPL-MCNC: 222 MG/DL
CREAT SERPL-MCNC: 0.59 MG/DL (ref 0.51–0.95)
DEPRECATED HCO3 PLAS-SCNC: 23 MMOL/L (ref 22–29)
ERYTHROCYTE [DISTWIDTH] IN BLOOD BY AUTOMATED COUNT: 11.8 % (ref 10–15)
GFR SERPL CREATININE-BSD FRML MDRD: >90 ML/MIN/1.73M2
GLUCOSE SERPL-MCNC: 83 MG/DL (ref 70–99)
HCG INTACT+B SERPL-ACNC: <1 MIU/ML
HCT VFR BLD AUTO: 37.1 % (ref 35–47)
HDLC SERPL-MCNC: 56 MG/DL
HGB BLD-MCNC: 12.2 G/DL (ref 11.7–15.7)
LDLC SERPL CALC-MCNC: 130 MG/DL
MCH RBC QN AUTO: 29.7 PG (ref 26.5–33)
MCHC RBC AUTO-ENTMCNC: 32.9 G/DL (ref 31.5–36.5)
MCV RBC AUTO: 90 FL (ref 78–100)
NONHDLC SERPL-MCNC: 166 MG/DL
PLATELET # BLD AUTO: 306 10E3/UL (ref 150–450)
POTASSIUM SERPL-SCNC: 4.1 MMOL/L (ref 3.4–5.3)
PROT SERPL-MCNC: 6.9 G/DL (ref 6.4–8.3)
RBC # BLD AUTO: 4.11 10E6/UL (ref 3.8–5.2)
SODIUM SERPL-SCNC: 138 MMOL/L (ref 136–145)
TRIGL SERPL-MCNC: 180 MG/DL
WBC # BLD AUTO: 4.7 10E3/UL (ref 4–11)

## 2023-06-20 PROCEDURE — 11982 REMOVE DRUG IMPLANT DEVICE: CPT | Performed by: OBSTETRICS & GYNECOLOGY

## 2023-06-20 PROCEDURE — 80061 LIPID PANEL: CPT | Performed by: OBSTETRICS & GYNECOLOGY

## 2023-06-20 PROCEDURE — 84702 CHORIONIC GONADOTROPIN TEST: CPT | Performed by: OBSTETRICS & GYNECOLOGY

## 2023-06-20 PROCEDURE — 85027 COMPLETE CBC AUTOMATED: CPT | Performed by: OBSTETRICS & GYNECOLOGY

## 2023-06-20 PROCEDURE — 99395 PREV VISIT EST AGE 18-39: CPT | Mod: 25 | Performed by: OBSTETRICS & GYNECOLOGY

## 2023-06-20 PROCEDURE — 36415 COLL VENOUS BLD VENIPUNCTURE: CPT | Performed by: OBSTETRICS & GYNECOLOGY

## 2023-06-20 PROCEDURE — 80053 COMPREHEN METABOLIC PANEL: CPT | Performed by: OBSTETRICS & GYNECOLOGY

## 2023-06-20 NOTE — PROGRESS NOTES
GYN CLINIC VISIT  2023  CC: annual exam, Nexplanon removal    HPI:  Catalina is a 32 year old  female who presents for annual exam.     Menses are regular q 2-4 weeks and normal lasting irregular days.  Menses flow: normal.  Patient's last menstrual period was 2023 (within days).. Using nexplanon for contraception.  She is not currently considering pregnancy.  Besides routine health maintenance,  she would like to discuss nexplanon removal.  - desires nexplanon removal. Getting  this summer. Planning to try to conceive in spring. Wants to get a regular cycle back. Plans to use condoms for contraception in the meantime. Recently (early May) stopped accutane.   GYNECOLOGIC HISTORY:   Catalina is sexually active with 1male partner(s) and is currently in monogamous relationship.    History sexually transmitted infections: h/o chlamydia in college, treated  STI testing offered?  Declined  ZAHIRA exposure: Unknown  History of abnormal Pap smear: NO - age 30-65 PAP every 5 years with negative HPV co-testing recommended. Last pap 2022 NIL. Next pap due in   Family history of breast CA: No  Family history of uterine/ovarian CA: No    Family history of colon CA: No    HEALTH MAINTENANCE:  Cholesterol: (  Cholesterol   Date Value Ref Range Status   2022 196 <200 mg/dL Final   2022 220 (H) <200 mg/dL Final   2021 208 (H) <200 mg/dL Final     Comment:     Desirable:       <200 mg/dl    History of abnormal lipids: Yes  Mammo: na . History of abnormal Mammo: Not applicable.  Regular Self Breast Exams: Yes  Calcium/Vitamin D intake: source:  dairy Adequate? Yes  TSH: (No results found for: TSH )  Pap; (No results found for: PAP )    HISTORY:  OB History    Para Term  AB Living   0 0 0 0 0 0   SAB IAB Ectopic Multiple Live Births   0 0 0 0 0     Past Medical History:   Diagnosis Date     Obese      Past Surgical History:   Procedure Laterality Date     NASAL SEPTUM SURGERY       06/2021     TONSILLECTOMY Bilateral 11/1/2022    Procedure: TONSILLECTOMY, BILATERAL ;  Surgeon: Deepali Enriquez MD;  Location: [facility] Main OR     Family History   Problem Relation Age of Onset     Ovarian cysts Mother      Heart Disease Father      Hypertension Father      Dementia Maternal Grandmother      Alzheimer Disease Maternal Grandmother      Pneumonia Paternal Grandmother      Glaucoma No family hx of      Macular Degeneration No family hx of      Social History   Currently lives alone  Getting  later this summer  Works as an  for [employer] and [employer] for exercise  Feels safe in her relationships  No tobacco, no drugs, rare EtOH  Socioeconomic History     Marital status: Single   Tobacco Use     Smoking status: Never     Smokeless tobacco: Never   Vaping Use     Vaping status: Never Used   Substance and Sexual Activity     Alcohol use: Not Currently     Drug use: Yes     Types: Marijuana     Comment: occasional     Sexual activity: Yes     Partners: Male     Birth control/protection: I.U.D.       Current Outpatient Medications:      Probiotic Product (UP4 PROBIOTICS WOMENS) CAPS, Take 1 capsule by mouth, Disp: , Rfl:      Ulipristal Acetate (LANDEN) 30 MG tablet, Take 1 tablet (30 mg) by mouth once for 1 dose, Disp: 1 tablet, Rfl: 1     Allergies   Allergen Reactions     Clindamycin Hives and Rash     Orally, topical OK  Orally, topical OK         Past medical, surgical, social and family history were reviewed and updated in EPIC.    ROS:   C:     NEGATIVE for fever, chills, change in weight  I:       NEGATIVE for worrisome rashes, moles or lesions  E:     NEGATIVE for vision changes or irritation  E/M: NEGATIVE for ear, mouth and throat problems  R:     NEGATIVE for significant cough or SOB  CV:   NEGATIVE for chest pain, palpitations or peripheral edema  GI:     NEGATIVE for nausea, abdominal pain, heartburn, or change in bowel habits  :   NEGATIVE for frequency,  "dysuria, hematuria, vaginal discharge, or irregular bleeding  M:     NEGATIVE for significant arthralgias or myalgia  N:      NEGATIVE for weakness, dizziness or paresthesias  E:      NEGATIVE for temperature intolerance, skin/hair changes  P:      NEGATIVE for changes in mood or affect.    EXAM:  /77   Pulse 98   Ht 1.692 m (5' 6.6\")   Wt 92.4 kg (203 lb 11.2 oz)   LMP 06/17/2023 (Within Days)   SpO2 97%   BMI 32.29 kg/m     BMI: Body mass index is 32.29 kg/m .  Constitutional: healthy, alert and no distress  Head: Normocephalic. No masses, lesions, tenderness or abnormalities  Neck: Neck supple. Trachea midline. No adenopathy. Thyroid symmetric, normal size.   Cardiovascular: RRR.   Respiratory: clear bilaterally.   Breast: No nodularity, asymmetry or nipple discharge bilaterally.  Gastrointestinal: Abdomen soft, non-tender, non-distended. No masses, organomegaly.  :  Vulva:  No external lesions, normal female hair distribution, no inguinal adenopathy.    Urethra:  Midline, non-tender, well supported, no discharge  Vagina:  Moist, pink, no abnormal discharge, no lesions  Cervix: nulliparous, no lesions, small amt dark blood from os  Uterus:  Normal size, anteverted , non-tender, freely mobile  Ovaries:  No masses appreciated, non-tender, mobile  Rectal Exam: deferred  Musculoskeletal: extremities normal  Skin: no suspicious lesions or rashes  Psychiatric: Affect appropriate, cooperative,mentation appears normal.     COUNSELING:   Reviewed preventive health counseling, as reflected in patient instructions       Regular exercise       Healthy diet/nutrition       Alcohol Use       Contraception       Family planning       Folic Acid   reports that she has never smoked. She has never used smokeless tobacco.    Body mass index is 32.29 kg/m .  Weight management plan: Discussed healthy diet and exercise guidelines  FRAX Risk Assessment    ASSESSMENT:  32 year old female with satisfactory annual exam  1. " Encounter for gynecological examination without abnormal finding  Last pap 4/20/2022 NIL. Next pap due in 2025      2. Encounter for Nexplanon removal    - REMOVAL NEXPLANON    3. Encounter for preconception consultation  Recommend starting prenatal vitamin before trying to conceive  No health issues or medication changes needed    4. Therapeutic drug monitoring  Per Derm  - HCG quantitative pregnancy  - Lipid Profile    5. Encounter for preventative adult health care examination  Per Derm  - Comprehensive metabolic panel (BMP + Alb, Alk Phos, ALT, AST, Total. Bili, TP)  - CBC with platelets          Nexplanon Removal:     Is a pregnancy test required: No.  Was a consent obtained?  Yes    Catalina Foster is here for removal of etonogestrel implant Nexplanon/Implanon    Indication: desire to conceive      Preoperative Diagnosis: etonogestrel implant  Postoperative Diagnosis: etonogestrel implant removed    Technique: On the left arm  Skin prep Betadine  Anesthesia 1% lidocaine, 3mL  Procedure: Small incision (<5mm) was made at distal end of palpable implant, curved hemostat or mosquito forceps was used to isolate the implant and bring it to the incision, the fibrous capsule containing the implant  was incised and the Implant was removed intact.    EBL: minimal  Complications:  No  Tolerance:  Pt tolerated procedure well and was in stable condition.   Dressing:    A pressure bandage was placed for the next 12-24 hours.    Contraception was discussed and patient chose the following method condoms      Follow up: Pt was instructed to call if bleeding, severe pain or foul smell.     Ladonna Emmanuel MD

## 2023-06-21 ENCOUNTER — MYC MEDICAL ADVICE (OUTPATIENT)
Dept: DERMATOLOGY | Facility: CLINIC | Age: 33
End: 2023-06-21
Payer: COMMERCIAL

## 2023-06-21 NOTE — TELEPHONE ENCOUNTER
Entered 5/9/2023 pregnancy test in chart into IPledge. Patient is now listed as inactive. Closing this encounter.    Yann Garcia, EMT

## 2023-06-21 NOTE — TELEPHONE ENCOUNTER
LVM requesting patient call back. If patient's last dose was 5/9 please enter 5/11 pregnancy test results into ipledge as initial post therapy results.    Yann Garcia, EMT

## 2023-08-01 ENCOUNTER — OFFICE VISIT (OUTPATIENT)
Dept: DERMATOLOGY | Facility: CLINIC | Age: 33
End: 2023-08-01
Payer: COMMERCIAL

## 2023-08-01 DIAGNOSIS — L70.0 ACNE VULGARIS: Primary | ICD-10-CM

## 2023-08-01 PROCEDURE — 99213 OFFICE O/P EST LOW 20 MIN: CPT | Performed by: DERMATOLOGY

## 2023-08-01 RX ORDER — TRETINOIN 0.25 MG/G
CREAM TOPICAL
Qty: 45 G | Refills: 11 | Status: SHIPPED | OUTPATIENT
Start: 2023-08-01

## 2023-08-01 RX ORDER — CLINDAMYCIN PHOSPHATE 10 UG/ML
LOTION TOPICAL 2 TIMES DAILY
Qty: 60 ML | Refills: 11 | Status: SHIPPED | OUTPATIENT
Start: 2023-08-01

## 2023-08-01 ASSESSMENT — PAIN SCALES - GENERAL: PAINLEVEL: NO PAIN (0)

## 2023-08-01 NOTE — PATIENT INSTRUCTIONS
If you want:  - Can start benzoyl peroxide 4-5% wash daily in shower. This can be purchased over the counter at [employer] or [employer] ([employer] makes this product). It can be found in a purple tube in the acne aisle. Be sure to rinse thoroughly with use as it can bleach towels and clothing.  - tretinoin 0.025% cream at bedtime. Apply pea-sized amount to face. This can be drying so please start every other night, then increase to nightly as tolerated after 1-2 weeks.  - clindamycin in AM

## 2023-08-01 NOTE — PROGRESS NOTES
[facility] Dermatology Note  Encounter Date: Aug 1, 2023  Office Visit     Dermatology Problem List:  1. Acne vulgaris   - s/p isotretinoin 40-80 (started 8/12/22, completed 5/9/23), reached 202 mg/kg dosing  - Prior tx: clindamycin 1% lotion, Retin-A 0.025%, spironolactone, doxycycline     ____________________________________________    Assessment & Plan:     # Acne vulgaris chronic, stable.  - Doing great after isotretinoin course (completed 5/9/23, reached 202 mg/kg dosing). Some mild acne on back but not too bothersome.  - Can resume topicals if desired, refilled today  - Notify me if flares/issues  - May be considering pregnancy in ~9 months, treatment of acne in pregnancy briefly discussed, advised to stop tretinoin at that time    Procedures Performed:   None  Follow-up: prn or in 1 year if needing refills of topicals, sooner if concerns      Staff:     Amalia Brannon MD    Department of Dermatology  [facility]: Phone: [phone], Fax: [phone]  8/1/2023    ____________________________________________    CC: Acne (Here for acne follow up. States acne on face has improved, but acne on back has returned )    HPI:  Ms. Catalina Foster is a(n) 32 year old female who presents today as a return patient for above.    Skin on face doing really well  Has come back on the back a little bit but overall not too bad, can feel more than see    Off birth control in June  Had went on this previously when 18 for acne    Patient is otherwise feeling well, without additional skin concerns.     Labs Reviewed:  N/A    Physical Exam:  Vitals: LMP 06/17/2023 (Within Days)   SKIN: Focused examination of face and back was performed.  - few small pustules on upper back  - No other lesions of concern on areas examined.     Medications:  Current Outpatient Medications   Medication    Probiotic Product (UP4 PROBIOTICS  WOMENS) CAPS    Ulipristal Acetate (LANDEN) 30 MG tablet     No current facility-administered medications for this visit.      Past Medical History:   Patient Active Problem List   Diagnosis    Acne vulgaris    Acute medial meniscal injury of knee, right, subsequent encounter    Screening for malignant neoplasm of cervix    Nexplanon in place     Past Medical History:   Diagnosis Date    Obese        CC No referring provider defined for this encounter. on close of this encounter.

## 2023-08-01 NOTE — LETTER
8/1/2023       RE: Catalina Foster  [address]  [facility] [address]     Dear Colleague,    Thank you for referring your patient, Catalina Foster, to the [facility] DERMATOLOGY CLINIC [place] [facility]. Please see a copy of my visit note below.    [facility] Dermatology Note  Encounter Date: Aug 1, 2023  Office Visit     Dermatology Problem List:  1. Acne vulgaris   - s/p isotretinoin 40-80 (started 8/12/22, completed 5/9/23), reached 202 mg/kg dosing  - Prior tx: clindamycin 1% lotion, Retin-A 0.025%, spironolactone, doxycycline     ____________________________________________    Assessment & Plan:     # Acne vulgaris chronic, stable.  - Doing great after isotretinoin course (completed 5/9/23, reached 202 mg/kg dosing). Some mild acne on back but not too bothersome.  - Can resume topicals if desired, refilled today  - Notify me if flares/issues  - May be considering pregnancy in ~9 months, treatment of acne in pregnancy briefly discussed, advised to stop tretinoin at that time    Procedures Performed:   None  Follow-up: prn or in 1 year if needing refills of topicals, sooner if concerns    Staff:     Amalia Brannon MD    Department of Dermatology  [facility]: Phone: [phone], Fax: [phone]  8/1/2023    ____________________________________________    CC: Acne (Here for acne follow up. States acne on face has improved, but acne on back has returned )    HPI:  Ms. Catalina Foster is a(n) 32 year old female who presents today as a return patient for above.    Skin on face doing really well  Has come back on the back a little bit but overall not too bad, can feel more than see    Off birth control in June  Had went on this previously when 18 for acne    Patient is otherwise feeling well, without additional skin concerns.     Labs  Reviewed:  N/A    Physical Exam:  Vitals: LMP 06/17/2023 (Within Days)   SKIN: Focused examination of face and back was performed.  - few small pustules on upper back  - No other lesions of concern on areas examined.     Medications:  Current Outpatient Medications   Medication    Probiotic Product (UP4 PROBIOTICS WOMENS) CAPS    Ulipristal Acetate (LANDEN) 30 MG tablet     No current facility-administered medications for this visit.      Past Medical History:   Patient Active Problem List   Diagnosis    Acne vulgaris    Acute medial meniscal injury of knee, right, subsequent encounter    Screening for malignant neoplasm of cervix    Nexplanon in place     Past Medical History:   Diagnosis Date    Obese      CC No referring provider defined for this encounter. on close of this encounter.

## 2023-08-01 NOTE — NURSING NOTE
Dermatology Rooming Note    Catalina Foster's goals for this visit include:   Chief Complaint   Patient presents with    Acne     Here for acne follow up. States acne on face has improved, but acne on back has returned      Joselyn Jackson RN

## 2023-08-02 ENCOUNTER — TELEPHONE (OUTPATIENT)
Dept: DERMATOLOGY | Facility: CLINIC | Age: 33
End: 2023-08-02
Payer: COMMERCIAL

## 2023-08-02 ENCOUNTER — TELEPHONE (OUTPATIENT)
Dept: DERMATOLOGY | Facility: CLINIC | Age: 33
End: 2023-08-02

## 2023-08-02 NOTE — TELEPHONE ENCOUNTER
Prior Authorization Retail Medication Request    Medication/Dose: tretinoin (RETIN-A) 0.025 % external cream   ICD code (if different than what is on RX):    Acne vulgaris [L70.0]  - Primary      Previously Tried and Failed:  see chart  Rationale:      Insurance Name:  PREFERREDONE   Insurance ID:  [id number]       [place]: [id number]

## 2023-08-02 NOTE — TELEPHONE ENCOUNTER
Prior Authorization Retail Medication Request    Medication/Dose: clindamycin (CLEOCIN T) 1 % external lotion  ICD code (if different than what is on RX):    Previously Tried and Failed:  see chart  Rationale:      Insurance Name: PREFERREDONE   Insurance ID:  [id number]

## 2023-08-03 NOTE — TELEPHONE ENCOUNTER
Central Prior Authorization Team   Phone: [phone]    PA Initiation    Medication: tretinoin (RETIN-A) 0.025 % external cream   Insurance Company: Comment:  [employer] [phone] (FAX) [phone]  Pharmacy Filling the Rx: [id number] - [place], [place] - [address] AT [facility] [address]  Filling Pharmacy Phone: [phone]  Filling Pharmacy Fax:    Start Date: 8/3/2023

## 2023-08-10 NOTE — TELEPHONE ENCOUNTER
Called insurance at [phone] and Rep Hartley stated that PA Request has been received on 8/4/2023 and currently is pending and being reviewed. Requested for urgent review and what the turn around time for urgent request. Rep Hartley stated that she updated request to urgent and that there is no given time on turn around time on outcome as each request varies by medication. Outcome will be notify via fax. Advised Rep that, never heard of that before on unknown turn around time on request.  PA Case # [id number].

## 2023-08-23 NOTE — TELEPHONE ENCOUNTER
Called insurance and Rep Lyssa stated that PA is still being review but it is at its final stage of review. Requested when there will be an outcome by and was told that there is no exact date that is given . The outcome will be notify via fax.

## 2023-09-11 NOTE — TELEPHONE ENCOUNTER
Called insurance and Rep Foyy stated that PA is still Pending and there is not given ETA when there will be an outcome. Request are worked according when received. Advised Rep Foyy that this requested has been over a month and would will a determination on request ASAP. Rep Foyy stated that she will try to reach out to Review Team to see if Request can be Expedited. Rep Knox stated that request has been updated to Expedited to clinical Review Team; however, with the update there is no ETA on outcome. Outcome will be notify via fax.

## 2023-09-12 NOTE — TELEPHONE ENCOUNTER
Prior Authorization Approval    Authorization Effective Date: 9/11/2023  Authorization Expiration Date:  IF APPLICABLE (as noted below)  Medication: tretinoin (RETIN-A) 0.025 % external cream -PA APPROVED   Approved Dose/Quantity: QUANTITY 45 PER 20 DAYS   Reference #:     Insurance Company: Comment:  [employer] [phone] (FAX) [phone]  Expected CoPay:       CoPay Card Available:      Foundation Assistance Needed:    Which Pharmacy is filling the prescription (Not needed for infusion/clinic administered): [id number] - [place], [place] - [address] AT [facility] [address]  Pharmacy Notified: Yes- **Instructed pharmacy to notify patient when script is ready to /ship.**  Patient Notified: Yes

## 2023-09-25 ENCOUNTER — OFFICE VISIT (OUTPATIENT)
Dept: MIDWIFE SERVICES | Facility: CLINIC | Age: 33
End: 2023-09-25
Payer: COMMERCIAL

## 2023-09-25 VITALS
BODY MASS INDEX: 32.92 KG/M2 | OXYGEN SATURATION: 99 % | WEIGHT: 204.8 LBS | DIASTOLIC BLOOD PRESSURE: 71 MMHG | SYSTOLIC BLOOD PRESSURE: 103 MMHG | HEART RATE: 48 BPM | HEIGHT: 66 IN

## 2023-09-25 DIAGNOSIS — Z23 NEED FOR PROPHYLACTIC VACCINATION AND INOCULATION AGAINST INFLUENZA: ICD-10-CM

## 2023-09-25 DIAGNOSIS — N89.8 VAGINAL ITCHING: ICD-10-CM

## 2023-09-25 DIAGNOSIS — L20.89 OTHER ATOPIC DERMATITIS: Primary | ICD-10-CM

## 2023-09-25 DIAGNOSIS — Z30.09 EMERGENCY CONTRACEPTIVE COUNSELING: ICD-10-CM

## 2023-09-25 LAB
CLUE CELLS: ABNORMAL
TRICHOMONAS, WET PREP: ABNORMAL
WBC'S/HIGH POWER FIELD, WET PREP: ABNORMAL
YEAST, WET PREP: ABNORMAL

## 2023-09-25 PROCEDURE — 99213 OFFICE O/P EST LOW 20 MIN: CPT | Mod: 25 | Performed by: ADVANCED PRACTICE MIDWIFE

## 2023-09-25 PROCEDURE — 87210 SMEAR WET MOUNT SALINE/INK: CPT | Performed by: ADVANCED PRACTICE MIDWIFE

## 2023-09-25 PROCEDURE — 90686 IIV4 VACC NO PRSV 0.5 ML IM: CPT | Performed by: ADVANCED PRACTICE MIDWIFE

## 2023-09-25 PROCEDURE — 90471 IMMUNIZATION ADMIN: CPT | Performed by: ADVANCED PRACTICE MIDWIFE

## 2023-09-25 RX ORDER — CLOBETASOL PROPIONATE 0.5 MG/G
CREAM TOPICAL 2 TIMES DAILY
Qty: 30 G | Refills: 1 | Status: SHIPPED | OUTPATIENT
Start: 2023-09-25

## 2023-09-25 NOTE — PROGRESS NOTES
"Catalina Foster is a 32 year old who presents to the clinic with vaginal itching for the past 2 wks. She has had vaginal yeast infections in the past but this feels different. The itching is in an area of perineum at 5 o'clock to her introitus. In the past two weeks it has gotten worse and better. Uncertain if there was any obvious trigger. Has been wearing period underwear. Has tried to use preparation H witch hazel wipes without improvement.     O: /71   Pulse (!) 48   Ht 1.676 m (5' 6\")   Wt 92.9 kg (204 lb 12.8 oz)   LMP 09/19/2023   SpO2 99%   BMI 33.06 kg/m      General: well appearing, in NAD  Cardiac: well perfused  Respiratory: non-labored breathing on room air   Psych: alert and oriented   PELVIC EXAM:  Vulva: BUS WNL, 2cm x 1cm area of perineum appears pale, dry, without the usual texture and has several small cracks that are in different stages of healing.   Vagina: Discharge white, no lesions noted,   Cervix: smooth, pink, no visible lesions, neg CMT  Uterus: Normal size and position, non-tender, mobile   Ovaries: No mass, non-tender, mobile  Rectal exam: deferred      A/P:  (N89.8) Vaginal itching  (primary encounter diagnosis)  Comment: negative results  Plan: Wet preparation            (L20.89) Other atopic dermatitis  Comment:   Plan: clobetasol (TEMOVATE) 0.05 % external cream            (Z30.09) Emergency contraceptive counseling  Comment: refill given. USing condoms with landen prescribed as back up.   Plan: Ulipristal Acetate (LANDEN) 30 MG tablet            (Z23) Need for prophylactic vaccination and inoculation against influenza  Comment:   Plan:       Patient will start with trial of steriod cream if unimproved or if it worsened she will switch to topical yeast cream. Can consider diagnosis of lichen schlerosis if improved with steroid cream and it becomes chronic.     Deena Chang, KATHRIN, APRN CNM        "

## 2023-09-25 NOTE — RESULT ENCOUNTER NOTE
Dear Catalina,    Your test results are attached below. Your wet prep was negative for infection. The WBCs are white blood cells and are just your immune system keeping things in good order and nothing to worry about.  If you have any questions, please contact me via [id number] or you can call our office at [phone].    Deena Garcia CNM, SKYLER PINON, CNM

## 2023-09-26 ENCOUNTER — TELEPHONE (OUTPATIENT)
Dept: DERMATOLOGY | Facility: CLINIC | Age: 33
End: 2023-09-26
Payer: COMMERCIAL

## 2023-09-26 NOTE — TELEPHONE ENCOUNTER
Prior Authorization Retail Medication Request    Medication/Dose: clindamycin (CLEOCIN T) 1 % external lotion  ICD code (if different than what is on RX):  see chart  Previously Tried and Failed:  see chart  Rationale:  see chart    Insurance Name: PREFERREDONE    Insurance ID:  [id number]

## 2023-09-28 NOTE — TELEPHONE ENCOUNTER
Central Prior Authorization Team   Phone: [phone]    PA Initiation    Medication: clindamycin (CLEOCIN T) 1 % external lotion  Insurance Company: Other (see comments)  Pharmacy Filling the Rx: [id number] - [place], [place] - [address] AT [facility] [address]  Filling Pharmacy Phone: [phone]  Filling Pharmacy Fax:    Start Date: 9/28/2023

## 2023-09-28 NOTE — TELEPHONE ENCOUNTER
Prior Authorization Approval    Authorization Effective Date: 9/28/2023  Authorization Expiration Date: 9/28/2099  Medication: clindamycin (CLEOCIN T) 1 % external lotion  Approved Dose/Quantity:   Reference #:     Insurance Company: Other (see comments)  Expected CoPay:       CoPay Card Available:      Foundation Assistance Needed:    Which Pharmacy is filling the prescription (Not needed for infusion/clinic administered): [id number] - [place], [place] - [address] AT [facility] [address]  Pharmacy Notified:  yes  Patient Notified:  yes-  Pharmacy will contact patient when ready to /ship

## 2023-10-27 ENCOUNTER — VIRTUAL VISIT (OUTPATIENT)
Dept: URGENT CARE | Facility: CLINIC | Age: 33
End: 2023-10-27
Payer: COMMERCIAL

## 2023-10-27 DIAGNOSIS — U07.1 INFECTION DUE TO 2019 NOVEL CORONAVIRUS: Primary | ICD-10-CM

## 2023-10-27 PROCEDURE — 99213 OFFICE O/P EST LOW 20 MIN: CPT | Mod: VID

## 2023-10-27 NOTE — PROGRESS NOTES
"Assessment & Plan     Infection due to 2019 novel coronavirus  - nirmatrelvir and ritonavir (PAXLOVID) 300 mg/100 mg therapy pack; Take 3 tablets by mouth 2 times daily for 5 days    Return in about 1 week (around 11/3/2023) for visit with primary care provider if not improving.   COVID-19 positive patient.  Encounter for consideration of medication intervention. Patient does qualify for a prescription. Full discussion with patient including medication options, risks and benefits. Potential drug interactions reviewed with patient.     Treatment Planned: Paxlovid  Temporary change to home medications: none     Estimated body mass index is 33.06 kg/m  as calculated from the following:    Height as of 9/25/23: 1.676 m (5' 6\").    Weight as of 9/25/23: 92.9 kg (204 lb 12.8 oz).  GFR Estimate   Date Value Ref Range Status   06/20/2023 >90 >60 mL/min/1.73m2 Final     ALT   Date Value Ref Range Status   06/20/2023 18 0 - 50 U/L Final     Comment:     Reference intervals for this test were updated on 6/12/2023 to more accurately reflect our healthy population. There may be differences in the flagging of prior results with similar values performed with this method. Interpretation of those prior results can be made in the context of the updated reference intervals.     05/11/2021 32 0 - 50 U/L Final     AST   Date Value Ref Range Status   06/20/2023 19 0 - 45 U/L Final     Comment:     Reference intervals for this test were updated on 6/12/2023 to more accurately reflect our healthy population. There may be differences in the flagging of prior results with similar values performed with this method. Interpretation of those prior results can be made in the context of the updated reference intervals.   05/11/2021 14 0 - 45 U/L Final     Alkaline Phosphatase   Date Value Ref Range Status   06/20/2023 60 35 - 104 U/L Final   05/11/2021 53 40 - 150 U/L Final     Lab Results   Component Value Date    ZPCFN24IDK Not Detected " 07/21/2020       Lea Johnson PA-C  [facility]    Subjective   Catalina Foster is a 32 year old who presents for the following health issues    HPI    Catalina presents via video after testing positive for COVID-19.  Symptoms first began in the evening 2 days ago but she started feeling much worse yesterday morning.  She states she has a foggy head, runny nose with clear mucus, body aches.  No cough or shortness of breath.    Review of Systems   ROS negative except as stated above.      Objective    Physical Exam   GENERAL: Healthy, alert and no distress  EYES: Eyes grossly normal to inspection.  No discharge or erythema, or obvious scleral/conjunctival abnormalities.  HENT: Normal cephalic/atraumatic.  External ears, nose and mouth without ulcers or lesions.  No nasal drainage visible.  RESP: No audible cough wheeze or visible cyanosis.  No visible retractions or increased work of breathing.    SKIN: Visible skin clear. No significant rash, abnormal pigmentation or lesions.  NEURO: Cranial nerves grossly intact.  Mentation and speech appropriate for age.  PSYCH: Mentation appears normal, affect normal/bright, judgement and insight intact, normal speech and appearance well-groomed.    Type of service:  Video Visit  Video Start Time: 10:33AM  Video End Time: 10:39AM  Originating Location (pt. Location): Home  Distant Location (provider location):  [facility]  Platform used for Video Visit: [employer]    No results found for any visits on 10/27/23.

## 2023-10-27 NOTE — PATIENT INSTRUCTIONS
Symptoms began October 25  Stay home and away from others through October 30  You may be around others wearing a well fitting mask October 31- November 4  Your isolation restrictions are over on November 5 as long as your symptoms are improving and you have been fever free for 24 hours, even if you still test positive for COVID.  However, if you test negative twice, at least 48 hours apart between days 6-10, you can stop wearing your mask earlier    Paxlovid can cause a rebound. This can happen if there is enough virus left in your body when the course of medication is complete that your symptoms develop again. If you start to feel ill again soon after finishing your course of Paxlovid, please repeat the above isolation to prevent spreading the virus to others.    Check O2 levels. If your number stays at 90 or below at rest, go to the emergency room.    Visit the CDC websites for more information and most up to date guidelines:  www.cdc.gov/coronavirus/2019-ncov/your-health/isolation.html  www.cdc.gov/coronavirus/2019-ncov/hcp/duration-isolation.html

## 2023-12-11 NOTE — PROGRESS NOTES
HPI: This patient is a 33yo F who presents for a post-op visit s/p tonsillectomy. Doing well overall now, though she had a rough recovery in terms of pain control and dehydration issues. Has now returned to normal activity and normal diet. No issues with infections or stones.     PHYSICAL EXAMINATION:  GEN: no acute distress, normocephalic  OC/OP: clear, dentition is appropriate for age. The tongue and palate are fully mobile and symmetric. The tonsillar fossae are well-healed.  NECK: soft and supple. No lymphadenopathy or masses. Airway is midline.  PULM: breathing comfortably on room air, normal chest expansion with respiration    MEDICAL DECISION-MAKING: doing well s/p tonsillectomy. RTC PRN      
and/or referrals for you. A list of these orders (if applicable) as well as your Preventive Care list are included within your After Visit Summary for your review. Other Preventive Recommendations:    A preventive eye exam performed by an eye specialist is recommended every 1-2 years to screen for glaucoma; cataracts, macular degeneration, and other eye disorders. A preventive dental visit is recommended every 6 months. Try to get at least 150 minutes of exercise per week or 10,000 steps per day on a pedometer . Order or download the FREE \"Exercise & Physical Activity: Your Everyday Guide\" from [employer]. Call 6-220.691.3711 or search [employer] online. You need 4297-3327 mg of calcium and 6653-6077 IU of vitamin D per day. It is possible to meet your calcium requirement with diet alone, but a vitamin D supplement is usually necessary to meet this goal.  When exposed to the sun, use a sunscreen that protects against both UVA and UVB radiation with an SPF of 30 or greater. Reapply every 2 to 3 hours or after sweating, drying off with a towel, or swimming. Always wear a seat belt when traveling in a car. Always wear a helmet when riding a bicycle or motorcycle.

## 2024-07-01 ENCOUNTER — OFFICE VISIT (OUTPATIENT)
Dept: URGENT CARE | Facility: URGENT CARE | Age: 34
End: 2024-07-01
Payer: COMMERCIAL

## 2024-07-01 VITALS
WEIGHT: 200 LBS | OXYGEN SATURATION: 98 % | HEIGHT: 66 IN | DIASTOLIC BLOOD PRESSURE: 70 MMHG | BODY MASS INDEX: 32.14 KG/M2 | HEART RATE: 76 BPM | TEMPERATURE: 98.9 F | SYSTOLIC BLOOD PRESSURE: 103 MMHG

## 2024-07-01 DIAGNOSIS — S60.10XA SUBUNGUAL HEMATOMA OF FINGER OF RIGHT HAND, INITIAL ENCOUNTER: Primary | ICD-10-CM

## 2024-07-01 PROCEDURE — 11740 EVACUATION SUBUNGUAL HMTMA: CPT | Performed by: PHYSICIAN ASSISTANT

## 2024-07-01 NOTE — PATIENT INSTRUCTIONS
(S60.10XA) Subungual hematoma of finger of right hand, initial encounter  (primary encounter diagnosis)  Comment:   Plan: EVACUATION BLOOD FROM UNDER NAIL          Finger was cleaned and dressed in clinic.  Local wound care daily.

## 2024-07-01 NOTE — PROGRESS NOTES
Patient presents with:  Urgent Care  Finger: Pt in clinic to have eval for right hand middle finger.    (S60.10XA) Subungual hematoma of finger of right hand, initial encounter  (primary encounter diagnosis)  Comment:   Plan: EVACUATION BLOOD FROM UNDER NAIL          Finger was cleaned and dressed in clinic.  Local wound care daily.      At the end of the encounter, I discussed results, diagnosis, medications. Discussed red flags for immediate return to clinic/ER, as well as indications for follow up if no improvement. Patient understood and agreed to plan. Patient was stable for discharge     If not improving or if condition worsens, follow up with your Primary Care Provider            SUBJECTIVE:   Catalina Foster is a 33 year old female who presents today with a painful right middle finger since yesterday when she accidentally had her finger slammed in the door.  There is a significant amount of blood underneath the nail.      Patient Active Problem List   Diagnosis    Acne vulgaris    Acute medial meniscal injury of knee, right, subsequent encounter    Screening for malignant neoplasm of cervix    Nexplanon in place         Past Medical History:   Diagnosis Date    Obese          Current Outpatient Medications   Medication Sig Dispense Refill    Multiple Vitamins-Iron (DAILY-AURORA/IRON/BETA-CAROTENE) TABS TAKE 1 TABLET BY MOUTH DAILY. (Patient not taking: Reported on 10/19/2020) 30 tablet 7     Social History     Tobacco Use    Smoking status: Never Smoker    Smokeless tobacco: Never Used   Substance Use Topics    Alcohol use: Not on file     Family History   Problem Relation Age of Onset    Diabetes Mother     Diabetes Father          ROS:    10 point ROS of systems including Constitutional, Eyes, Respiratory, Cardiovascular, Gastroenterology, Genitourinary, Integumentary, Muscularskeletal, Psychiatric ,neurological were all negative except for pertinent positives noted in my HPI       OBJECTIVE:  /70    "Pulse 76   Temp 98.9  F (37.2  C) (Temporal)   Ht 1.676 m (5' 6\")   Wt 90.7 kg (200 lb)   SpO2 98%   BMI 32.28 kg/m    Physical Exam:  GENERAL APPEARANCE: healthy, alert and no distress  Extremities: Right third finger.  Blood under the nail.  No bony abnormality or tenderness.  NEURO: Normal strength and tone, sensory exam grossly normal,  normal speech and mentation  SKIN: no suspicious lesions or rashes    PROCEDURE: The nail of the right third finger was cleaned with an alcohol prep pad.  Electrocautery was used to successfully make 2 small openings in the nail and copious dark red blood was released.    The finger was then cleaned and dressed with a bandage.    "

## 2024-08-03 ENCOUNTER — HEALTH MAINTENANCE LETTER (OUTPATIENT)
Age: 34
End: 2024-08-03

## 2025-08-16 ENCOUNTER — HEALTH MAINTENANCE LETTER (OUTPATIENT)
Age: 35
End: 2025-08-16

## (undated) DEVICE — SOL WATER IRRIG 1000ML BOTTLE 2F7114

## (undated) DEVICE — PLATE GROUNDING ADULT W/CORD 9165L

## (undated) DEVICE — KIT DEFOGGING-FOG INHIBITOR FOG1001

## (undated) DEVICE — ESU ELEC BLADE 2.75" COATED/INSULATED E1455

## (undated) DEVICE — GLOVE BIOGEL PI ULTRATOUCH G SZ 6.5 42165

## (undated) DEVICE — GOWN IMPERVIOUS BREATHABLE SMART LG 89015

## (undated) DEVICE — TUBING SUCTION MEDI-VAC 1/4"X20' N620A - HE

## (undated) DEVICE — DRAPE U SPLIT 74X120" 29440

## (undated) DEVICE — SYRINGE EAR/ULCER 3 OZ MEDICHOICE

## (undated) DEVICE — CATH RED RUBBER 10FR LATEX 0094100

## (undated) DEVICE — CUSTOM PACK MINOR SBA5BMNHEA

## (undated) DEVICE — SUCTION MANIFOLD NEPTUNE 2 SYS 1 PORT 702-025-000

## (undated) DEVICE — SUCTION TIP YANKAUER W/O VENT K86

## (undated) DEVICE — ESU PENCIL SMOKE EVAC W/ROCKER SWITCH 0703-047-000

## (undated) RX ORDER — FENTANYL CITRATE 50 UG/ML
INJECTION, SOLUTION INTRAMUSCULAR; INTRAVENOUS
Status: DISPENSED
Start: 2022-11-01

## (undated) RX ORDER — LIDOCAINE HYDROCHLORIDE 10 MG/ML
INJECTION, SOLUTION EPIDURAL; INFILTRATION; INTRACAUDAL; PERINEURAL
Status: DISPENSED
Start: 2022-11-01

## (undated) RX ORDER — DEXMEDETOMIDINE HYDROCHLORIDE 4 UG/ML
INJECTION, SOLUTION INTRAVENOUS
Status: DISPENSED
Start: 2022-11-01

## (undated) RX ORDER — PROPOFOL 10 MG/ML
INJECTION, EMULSION INTRAVENOUS
Status: DISPENSED
Start: 2022-11-01